# Patient Record
Sex: FEMALE | Race: BLACK OR AFRICAN AMERICAN | NOT HISPANIC OR LATINO | Employment: UNEMPLOYED | URBAN - METROPOLITAN AREA
[De-identification: names, ages, dates, MRNs, and addresses within clinical notes are randomized per-mention and may not be internally consistent; named-entity substitution may affect disease eponyms.]

---

## 2018-02-19 DIAGNOSIS — T84.093A OTHER MECHANICAL COMPLICATION OF INTERNAL LEFT KNEE PROSTHESIS, INITIAL ENCOUNTER: Primary | ICD-10-CM

## 2018-02-22 ENCOUNTER — CLINICAL SUPPORT (OUTPATIENT)
Dept: REHABILITATION | Facility: HOSPITAL | Age: 53
End: 2018-02-22
Attending: ORTHOPAEDIC SURGERY
Payer: COMMERCIAL

## 2018-02-22 DIAGNOSIS — M25.562 ACUTE PAIN OF LEFT KNEE: ICD-10-CM

## 2018-02-22 DIAGNOSIS — R26.2 DIFFICULTY WALKING: ICD-10-CM

## 2018-02-22 PROCEDURE — 97161 PT EVAL LOW COMPLEX 20 MIN: CPT

## 2018-02-22 PROCEDURE — 97140 MANUAL THERAPY 1/> REGIONS: CPT

## 2018-02-22 NOTE — PROGRESS NOTES
[facility]   PATIENT EVALUATION    Date: 02/22/2018  Start Time: 1400  Stop Time: 1500  Visit #:    Patient Name: Alcira Selby  Clinic Number: [id number]  Age: 52 y.o.  Gender: female    Diagnosis:   Encounter Diagnoses   Name Primary?    Acute pain of left knee     Difficulty walking        Referring Physician: Stacey Friedman MD  Treatment Orders: PT Eval and Treat      History     No past medical history on file.    No current outpatient prescriptions on file.     No current facility-administered medications for this visit.        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 Original surgery was January 2017. Pt underwent 6 months of therapy but continued to have pain. Denies any numbness or tingling at this time. Pt states she is unable to lift the extremity independently.    Date of Surgery: 2/15/18  Precautions: None    Mechanism of Injury: Post -op    Pain Location: knee   Pain Description: Aching and Tight  Current Pain: 10/10  Least Pain: 9/10  Worst Pain: 10/10  Aggravating Factors: Bending, walking, squatting  Relieving Factors: R.I.C.E    Diagnostic Tests: NA  Prior Therapy: PT in hospital    Occupation: OR tech  Job Status: Not working  Job Duties: Prolonged standing    Sports/Recreational Activities: Community ambulator  Extremity Dominance: Right    Prior Level of Function: Independent  Functional Deficits Leading to Referral/Nature of Injury: Pain with standing ADL, walking, squatting, kneeling, climbing  Patient Therapy Goals: Improve gait and decrease pain with daily activities  Cultural/Environmental/Spiritual Barriers to Treatment or Learning: None      Objective     Observation: Pt enters Mod I using a rolling walker  Posture: Bent knee pattern  Gait: Antalgic with limited weight bearing on the LLE, shortened step length and  stance time    Dermatomes: Intact  DTRs: NT    Palpation: (+) Tenderness  (+) Medial/lateral joint line LLE  (+) Quadriceps LLE    Range of Motion:     Right Knee: 0-125    Left Knee: -5 - 90    Patella Mobility:   Right Knee: WNL    Left Knee: NT      Flexibility:   Hamstring: Restricted Mod  Hip Flexor: Restricted Mod  Quad: NT  GSS: Restricted Mod  ITB: NT    Strength:   Right Hip  Flexion: 4  Extension: 4  Abduction: 4    Left Hip  Flexion: 3-  Extension: 3-  Abduction: 3-    Right Knee  Flexion: 4  Extension: 4    Left Knee  Flexion: 2+  Extension: 2+    Special Tests: NT secondary to dx  Eli's  Chiquita's  Anterior Drawer  Posterior Drawer  Valgus Laxity  Varus Laxity      Treatment:   - Manual edema brushing x 10 min  - HEP edu: Quad sets, Heel slides, SLR, Supine hip abd, Ext stretch GSS    Functional Limitations Reports - G Codes  Category: Mobility  Tool: FOTO  Score: 10            Assessment     This is a 52 y.o. 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 and demonstrates limitations as described in the problem list. Primary impairments include strength, ROM, edema, gait, balance, and pain which limits functional mobility.  Pt demonstrates good rehab potential. Pt will benefit from physcial therapy services in order to maximize pain free and/or functional use of left LE. The following goals were discussed with the patient and patient is in agreement with them as to be addressed in the treatment plan. Pt was given a HEP consisting. Pt verbally understood the instructions as they were given and demonstrated proper form and technique during therapy. Pt was advised to perform these exercises free of pain, and to stop performing them if pain occurs.     Medical necessity is demonstrated by the following problem list:   - Pain limits function of effected part for all activities  - Unable to participate in daily activities    - Requires skilled supervision to complete and progress HEP  - Fall risk - impaired balance   - Continued inability to participate in vocational pursuits    Short Term Goals (4 Weeks):  - Pt will increase ROM to 0-115 degrees PROM  - Pt will increase strength to perform a SLR without extension lag  - Decrease Pain to 0-6/10 ambulating 150 feet with a straight cane  - Decrease edema by 2CM  - Pt independent with HEP with progressions.     Long Term Goals (8 Weeks):  - Pt will increase ROM to 0-125 degrees AROM  - Pt will increase strength to L knee extnsion to > 4/5  - Decrease Pain to 0-3/10 with community ambulation  - Pt to return to work simulated activity  - Pt to improve FOTO score to >50      Plan     Pt will be treated by physical therapy 2-3 times a week for 10 weeks for manual therapy, therapeutic exercise, home exercise program, patient education, and modalities PRN to achieve established goals. Alcira may at times be seen by a PTA as part of the Rehab Team.  Plan to begin TI in order to manage edema then decrease frequency    José Miguel Laughlin, PT, DPT, OCS  02/22/2018    I CERTIFY THE NEED FOR THESE SERVICES FURNISHED UNDER THIS PLAN OF TREATMENT AND WHILE UNDER MY CAR  Physician's comments: _____________________________________________________________________________________________________________________    Physician's Name: ___________________________________

## 2018-02-23 ENCOUNTER — CLINICAL SUPPORT (OUTPATIENT)
Dept: REHABILITATION | Facility: HOSPITAL | Age: 53
End: 2018-02-23
Attending: ORTHOPAEDIC SURGERY
Payer: COMMERCIAL

## 2018-02-23 DIAGNOSIS — M25.562 ACUTE PAIN OF LEFT KNEE: Primary | ICD-10-CM

## 2018-02-23 PROCEDURE — 97140 MANUAL THERAPY 1/> REGIONS: CPT

## 2018-02-23 PROCEDURE — 97110 THERAPEUTIC EXERCISES: CPT

## 2018-02-23 NOTE — PROGRESS NOTES
"[facility] PHYSICAL THERAPY       Date: 02/22/2018  Start Time: 1330  Stop Time: 1430  Visit #: 2    Patient Name: Alcira Selby  Clinic Number: [id number]  Age: 52 y.o.  Gender: female    Diagnosis:   Encounter Diagnoses   Name Primary?    Acute pain of left knee     Difficulty walking        Referring Physician: Stacey Friedman MD  Treatment Orders: PT Eval and Treat      History     No past medical history on file.    No current outpatient prescriptions on file.     No current facility-administered medications for this visit.        Review of patient's allergies indicates:  Allergies not on file      Subjective     Pt stated pain level in L knee in 9/10. Minimal compliance with HEP.       Objective      Pt enters Mod I using a rolling walker, antalgic gait pattern with  Bent knee pattern, swelling and edema   PROM L knee flexion 100'   Treatment:   -QS 2x10/3"  -HS with strap 2x10  - Manual edema brushing x 25 min  -Stationary bike 10' 1/2 and full revolution Seat level 7  -CP x 10' L knee     Functional Limitations Reports - G Codes  Category: Mobility  Tool: FOTO  Score: 10            Assessment    pt tolerating tx well with decreased pain and increased knee flexion tolerating full revolution in reverse. VC/TC for quad engagement and instructed in compliance with HEP and RICE. Continue to progress as tolerated.   This is a 52 y.o. 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 and demonstrates limitations as described in the problem list. Primary impairments include strength, ROM, edema, gait, balance, and pain which limits functional mobility.  Pt demonstrates good rehab potential. Pt will benefit from physcial therapy services in order to maximize pain free and/or functional use of left LE. The following goals were discussed with the patient and patient is in agreement with them as to be addressed " in the treatment plan. Pt was given a HEP consisting. Pt verbally understood the instructions as they were given and demonstrated proper form and technique during therapy. Pt was advised to perform these exercises free of pain, and to stop performing them if pain occurs.     Medical necessity is demonstrated by the following problem list:   - Pain limits function of effected part for all activities  - Unable to participate in daily activities   - Requires skilled supervision to complete and progress HEP  - Fall risk - impaired balance   - Continued inability to participate in vocational pursuits    Short Term Goals (4 Weeks):  - Pt will increase ROM to 0-115 degrees PROM  - Pt will increase strength to perform a SLR without extension lag  - Decrease Pain to 0-6/10 ambulating 150 feet with a straight cane  - Decrease edema by 2CM  - Pt independent with HEP with progressions.     Long Term Goals (8 Weeks):  - Pt will increase ROM to 0-125 degrees AROM  - Pt will increase strength to L knee extnsion to > 4/5  - Decrease Pain to 0-3/10 with community ambulation  - Pt to return to work simulated activity  - Pt to improve FOTO score to >50      Plan     Pt will be treated by physical therapy 2-3 times a week for 10 weeks for manual therapy, therapeutic exercise, home exercise program, patient education, and modalities PRN to achieve established goals. Alcira may at times be seen by a PTA as part of the Rehab Team.  Plan to begin TI in order to manage edema then decrease frequency    Nelson Wall PTA, STS

## 2018-02-26 ENCOUNTER — CLINICAL SUPPORT (OUTPATIENT)
Dept: REHABILITATION | Facility: HOSPITAL | Age: 53
End: 2018-02-26
Attending: ORTHOPAEDIC SURGERY
Payer: COMMERCIAL

## 2018-02-26 DIAGNOSIS — M25.562 ACUTE PAIN OF LEFT KNEE: Primary | ICD-10-CM

## 2018-02-26 PROCEDURE — 97140 MANUAL THERAPY 1/> REGIONS: CPT

## 2018-02-26 PROCEDURE — 97110 THERAPEUTIC EXERCISES: CPT

## 2018-02-26 NOTE — PROGRESS NOTES
"[facility]       Date: 02/22/2018  Start Time: 1100  Stop Time: 1200  Visit #: 3    Patient Name: Alcira Selby  Clinic Number: [id number]  Age: 52 y.o.  Gender: female    Diagnosis:   Encounter Diagnoses   Name Primary?    Acute pain of left knee     Difficulty walking        Referring Physician: Stacey Friedman MD  Treatment Orders: PT Eval and Treat      History     No past medical history on file.    No current outpatient prescriptions on file.     No current facility-administered medications for this visit.        Review of patient's allergies indicates:  Allergies not on file      Subjective     Pt stating continued severe pain in L knee when arrived for tx. Pain scale 9/10. Minimal compliance with HEP but stated "alot of walking over the weekend".        Objective      Pt enters Mod I using a rolling walker, antalgic gait pattern with knee flexed, ambulating on toes. Decreased swelling and edema   PROM L knee flexion 100'   Treatment:   -HS with strap 2x10  -QS 2x10/3"  -Prone knee hangs 1#/5'  -Manual therapy x 10' patella MOBS/PROM/STM roller L HS/calf  x 25'  -Stationary bike 10' full revolution Seat level 7 for increased ROM   -CP x 10' L knee np   Pt educated on Compliance with TKE exercise along with Knee flexion HEP.      Assessment    Pt tolerating tx well. Pt subjective pain level not consistent with activity level without any sign of pain or facial expressions except with PROM and prone hangs.  Pt very slow progressing through therapeutic exercise.  VC/TC for quad engagement and instructed in compliance with HEP and RICE. Continue to progress as tolerated.   This is a 52 y.o. 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 and demonstrates limitations as described in the problem list. Primary impairments include strength, ROM, edema, gait, balance, and pain which limits " functional mobility.  Pt demonstrates good rehab potential. Pt will benefit from physcial therapy services in order to maximize pain free and/or functional use of left LE. The following goals were discussed with the patient and patient is in agreement with them as to be addressed in the treatment plan. Pt was given a HEP consisting. Pt verbally understood the instructions as they were given and demonstrated proper form and technique during therapy. Pt was advised to perform these exercises free of pain, and to stop performing them if pain occurs.     Medical necessity is demonstrated by the following problem list:   - Pain limits function of effected part for all activities  - Unable to participate in daily activities   - Requires skilled supervision to complete and progress HEP  - Fall risk - impaired balance   - Continued inability to participate in vocational pursuits    Short Term Goals (4 Weeks):  - Pt will increase ROM to 0-115 degrees PROM  - Pt will increase strength to perform a SLR without extension lag  - Decrease Pain to 0-6/10 ambulating 150 feet with a straight cane  - Decrease edema by 2CM  - Pt independent with HEP with progressions.     Long Term Goals (8 Weeks):  - Pt will increase ROM to 0-125 degrees AROM  - Pt will increase strength to L knee extnsion to > 4/5  - Decrease Pain to 0-3/10 with community ambulation  - Pt to return to work simulated activity  - Pt to improve FOTO score to >50      Plan     Pt will be treated by physical therapy 2-3 times a week for 10 weeks for manual therapy, therapeutic exercise, home exercise program, patient education, and modalities PRN to achieve established goals. Alcira may at times be seen by a PTA as part of the Rehab Team.  Plan to begin TI in order to manage edema then decrease frequency    Nelson Wall PTA, STS

## 2018-03-01 ENCOUNTER — CLINICAL SUPPORT (OUTPATIENT)
Dept: REHABILITATION | Facility: HOSPITAL | Age: 53
End: 2018-03-01
Attending: ORTHOPAEDIC SURGERY
Payer: COMMERCIAL

## 2018-03-01 DIAGNOSIS — M25.562 ACUTE PAIN OF LEFT KNEE: Primary | ICD-10-CM

## 2018-03-01 PROCEDURE — 97110 THERAPEUTIC EXERCISES: CPT

## 2018-03-01 PROCEDURE — 97140 MANUAL THERAPY 1/> REGIONS: CPT

## 2018-03-01 NOTE — PROGRESS NOTES
"[facility]       Date: 02/22/2018  Start Time: 1230  Stop Time:  1330  Visit #: 4    Patient Name: Alcira Selby  Clinic Number: [id number]  Age: 52 y.o.  Gender: female    Diagnosis:   Encounter Diagnoses   Name Primary?    Acute pain of left knee     Difficulty walking        Referring Physician: Stacey Friedman MD  Treatment Orders: PT Eval and Treat      History     No past medical history on file.    No current outpatient prescriptions on file.     No current facility-administered medications for this visit.        Review of patient's allergies indicates:  Allergies not on file      Subjective     Pt reporting staples removed yesterday. " Its feeling better" Stated pain level in 8/10 when arrived for tx. Minimal compliance with HEP and RICE.       Objective      Pt enters Mod I using a rolling walker, antalgic gait pattern with knee flexed, ambulating on toes. Decreased swelling and edema   PROM L knee flexion 100'       Treatment:   -Stationary bike 10' half/full revolution Seat level 6 for increased ROM - lower seat   -Standing GTB TKE 25x  -Prone knee hangs 1#/5' with HS roller  --Manual therapy  patella MOBS/PROM/retro edema massage/;overpressure ext x 25'  -QS 2x10/3"  -CP x 10' L knee np      -HS with strap 2x10 np     Pt educated on Compliance with TKE exercise along with Knee flexion HEP.      Assessment    Pt tolerating tx well. Subjective pain level still not consistent with functional level. No sign of pain or facial expressions except with manual therapy.  Pt very slow progressing through therapeutic exercise.  VC/TC for quad engagement and instructed in compliance with HEP and RICE. Continue to progress as tolerated.   This is a 52 y.o. 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 and demonstrates limitations as described in the problem list. Primary impairments include " strength, ROM, edema, gait, balance, and pain which limits functional mobility.  Pt demonstrates good rehab potential. Pt will benefit from physcial therapy services in order to maximize pain free and/or functional use of left LE. The following goals were discussed with the patient and patient is in agreement with them as to be addressed in the treatment plan. Pt was given a HEP consisting. Pt verbally understood the instructions as they were given and demonstrated proper form and technique during therapy. Pt was advised to perform these exercises free of pain, and to stop performing them if pain occurs.     Medical necessity is demonstrated by the following problem list:   - Pain limits function of effected part for all activities  - Unable to participate in daily activities   - Requires skilled supervision to complete and progress HEP  - Fall risk - impaired balance   - Continued inability to participate in vocational pursuits    Short Term Goals (4 Weeks):  - Pt will increase ROM to 0-115 degrees PROM  - Pt will increase strength to perform a SLR without extension lag  - Decrease Pain to 0-6/10 ambulating 150 feet with a straight cane  - Decrease edema by 2CM  - Pt independent with HEP with progressions.     Long Term Goals (8 Weeks):  - Pt will increase ROM to 0-125 degrees AROM  - Pt will increase strength to L knee extnsion to > 4/5  - Decrease Pain to 0-3/10 with community ambulation  - Pt to return to work simulated activity  - Pt to improve FOTO score to >50      Plan     Pt will be treated by physical therapy 2-3 times a week for 10 weeks for manual therapy, therapeutic exercise, home exercise program, patient education, and modalities PRN to achieve established goals. Blayneurielsysaeid may at times be seen by a PTA as part of the Rehab Team.  Plan to begin TI in order to manage edema then decrease frequency    Nelson Wall PTA, STS

## 2018-03-02 ENCOUNTER — CLINICAL SUPPORT (OUTPATIENT)
Dept: REHABILITATION | Facility: HOSPITAL | Age: 53
End: 2018-03-02
Attending: ORTHOPAEDIC SURGERY
Payer: COMMERCIAL

## 2018-03-02 DIAGNOSIS — M25.562 ACUTE PAIN OF LEFT KNEE: Primary | ICD-10-CM

## 2018-03-02 PROCEDURE — 97110 THERAPEUTIC EXERCISES: CPT

## 2018-03-02 PROCEDURE — 97014 ELECTRIC STIMULATION THERAPY: CPT

## 2018-03-02 PROCEDURE — 97140 MANUAL THERAPY 1/> REGIONS: CPT

## 2018-03-02 NOTE — PROGRESS NOTES
"[facility]       Date: 02/22/2018  Start Time: 1225  Stop Time:  1325  Visit #: 5    Patient Name: Alcira Selby  Clinic Number: [id number]  Age: 52 y.o.  Gender: female    Diagnosis:   Encounter Diagnoses   Name Primary?    Acute pain of left knee     Difficulty walking        Referring Physician: Stacey Friedman MD  Treatment Orders: PT Eval and Treat      History     No past medical history on file.    No current outpatient prescriptions on file.     No current facility-administered medications for this visit.        Review of patient's allergies indicates:  Allergies not on file      Subjective     Pt reporting "sore after tx yesterday". Stated pain level in 6/10 when arrived for tx. Minimal compliance with HEP and RICE.       Objective      Pt enters Mod I using a rolling walker, antalgic gait pattern with knee flexed, ambulating on toes. Decreased swelling and edema, L knee flexion contracture   PROM L knee flexion 100'       Treatment:   -Stationary bike 10' full revolution Seat level 6 for increased ROM - lower seat   --Manual therapy  patella MOBS/PROM/and over pressure ext 15'  -Prone knee hangs 1#/5' with HS roller  -prone TKE on bolster 30x/3"  -L SLR with strap 3x10/3"  -Standing GTB TKE 25x np  -QS 2x10/3"    -NMES and CP x 10' L knee            Pt educated on Compliance with TKE exercise along with Knee flexion HEP.      Assessment    Pt tolerating tx well. Slow progress through therapeutic exercise. Pt with overall decrease in pain and edema but still with noted knee flexion contracture. Continue with progressing to TKE.  VC/TC for quad engagement and instructed in compliance with HEP and RICE. Continue to progress as tolerated.   This is a 52 y.o. 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 and demonstrates limitations as described in the problem list. Primary " impairments include strength, ROM, edema, gait, balance, and pain which limits functional mobility.  Pt demonstrates good rehab potential. Pt will benefit from physcial therapy services in order to maximize pain free and/or functional use of left LE. The following goals were discussed with the patient and patient is in agreement with them as to be addressed in the treatment plan. Pt was given a HEP consisting. Pt verbally understood the instructions as they were given and demonstrated proper form and technique during therapy. Pt was advised to perform these exercises free of pain, and to stop performing them if pain occurs.     Medical necessity is demonstrated by the following problem list:   - Pain limits function of effected part for all activities  - Unable to participate in daily activities   - Requires skilled supervision to complete and progress HEP  - Fall risk - impaired balance   - Continued inability to participate in vocational pursuits    Short Term Goals (4 Weeks):  - Pt will increase ROM to 0-115 degrees PROM  - Pt will increase strength to perform a SLR without extension lag  - Decrease Pain to 0-6/10 ambulating 150 feet with a straight cane  - Decrease edema by 2CM  - Pt independent with HEP with progressions.     Long Term Goals (8 Weeks):  - Pt will increase ROM to 0-125 degrees AROM  - Pt will increase strength to L knee extnsion to > 4/5  - Decrease Pain to 0-3/10 with community ambulation  - Pt to return to work simulated activity  - Pt to improve FOTO score to >50      Plan     Pt will be treated by physical therapy 2-3 times a week for 10 weeks for manual therapy, therapeutic exercise, home exercise program, patient education, and modalities PRN to achieve established goals. Essencesysaeid may at times be seen by a PTA as part of the Rehab Team.  Plan to begin TI in order to manage edema then decrease frequency    Nelson Wall PTA, STS

## 2018-03-06 ENCOUNTER — CLINICAL SUPPORT (OUTPATIENT)
Dept: REHABILITATION | Facility: HOSPITAL | Age: 53
End: 2018-03-06
Attending: ORTHOPAEDIC SURGERY
Payer: COMMERCIAL

## 2018-03-06 PROCEDURE — 97110 THERAPEUTIC EXERCISES: CPT

## 2018-03-06 PROCEDURE — 97140 MANUAL THERAPY 1/> REGIONS: CPT

## 2018-03-06 NOTE — PROGRESS NOTES
"[facility] PHYSICAL THERAPY       Date: 02/22/2018  Start Time: 1250  Stop Time:  1350  Visit #: 6    Patient Name: Alcira Selby  Clinic Number: [id number]  Age: 52 y.o.  Gender: female    Diagnosis:   Encounter Diagnoses   Name Primary?    Acute pain of left knee     Difficulty walking        Referring Physician: Stacey Friedman MD  Treatment Orders: PT Eval and Treat      History     No past medical history on file.    No current outpatient prescriptions on file.     No current facility-administered medications for this visit.        Review of patient's allergies indicates:  Allergies not on file      Subjective     Pt reporting mod soreness today when arrived for tx. Stated pain level in 7/10 when arrived for tx. Minimal compliance with HEP and RICE.       Objective      Pt enters Mod I using a rolling walker, antalgic gait pattern with knee flexed, ambulating on toes. Decreased swelling and edema, L knee flexion contracture   PROM L knee flexion 100'       Treatment:   -Stationary bike 10' full revolution Seat level 6 for increased ROM - lower seat   --Manual therapy  patella MOBS/PROM/and over pressure ext 10'  -Prone knee hangs 5#/5' with HS roller  -prone TKE on bolster 30x/3"  -prone knee flexion w/strap 5'  -L SLR with strap 3x10/3"  -Standing GTB TKE 25x np  -QS 2x10/3"    -NMES and CP x 10' L knee            Pt educated on Compliance with TKE exercise along with Knee flexion HEP.      Assessment    Pt tolerating tx well. Still very slow progressing  through therapeutic exercise requiring VC/TC for encouragement and for increased pace.  Poor gait pattern with knee flexion contracture but slow progress to TKE. VC/TC for quad engagement and instructed in compliance with HEP and RICE. Continue to progress as tolerated.   This is a 52 y.o. 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 and " demonstrates limitations as described in the problem list. Primary impairments include strength, ROM, edema, gait, balance, and pain which limits functional mobility.  Pt demonstrates good rehab potential. Pt will benefit from physcial therapy services in order to maximize pain free and/or functional use of left LE. The following goals were discussed with the patient and patient is in agreement with them as to be addressed in the treatment plan. Pt was given a HEP consisting. Pt verbally understood the instructions as they were given and demonstrated proper form and technique during therapy. Pt was advised to perform these exercises free of pain, and to stop performing them if pain occurs.     Medical necessity is demonstrated by the following problem list:   - Pain limits function of effected part for all activities  - Unable to participate in daily activities   - Requires skilled supervision to complete and progress HEP  - Fall risk - impaired balance   - Continued inability to participate in vocational pursuits    Short Term Goals (4 Weeks):  - Pt will increase ROM to 0-115 degrees PROM  - Pt will increase strength to perform a SLR without extension lag  - Decrease Pain to 0-6/10 ambulating 150 feet with a straight cane  - Decrease edema by 2CM  - Pt independent with HEP with progressions.     Long Term Goals (8 Weeks):  - Pt will increase ROM to 0-125 degrees AROM  - Pt will increase strength to L knee extnsion to > 4/5  - Decrease Pain to 0-3/10 with community ambulation  - Pt to return to work simulated activity  - Pt to improve FOTO score to >50      Plan     Pt will be treated by physical therapy 2-3 times a week for 10 weeks for manual therapy, therapeutic exercise, home exercise program, patient education, and modalities PRN to achieve established goals. Alcira may at times be seen by a PTA as part of the Rehab Team.  Plan to begin TI in order to manage edema then decrease frequency    Nelson Wall  PTA, STS

## 2018-03-08 ENCOUNTER — CLINICAL SUPPORT (OUTPATIENT)
Dept: REHABILITATION | Facility: HOSPITAL | Age: 53
End: 2018-03-08
Attending: ORTHOPAEDIC SURGERY
Payer: COMMERCIAL

## 2018-03-08 DIAGNOSIS — M25.562 ACUTE PAIN OF LEFT KNEE: ICD-10-CM

## 2018-03-08 DIAGNOSIS — R26.2 DIFFICULTY WALKING: ICD-10-CM

## 2018-03-08 PROCEDURE — 97110 THERAPEUTIC EXERCISES: CPT

## 2018-03-08 PROCEDURE — 97140 MANUAL THERAPY 1/> REGIONS: CPT

## 2018-03-08 NOTE — PROGRESS NOTES
[facility]       Date: 02/22/2018  Start Time: 1225  Stop Time:  1325  Visit #: 7    Patient Name: Alcira Selby  Clinic Number: [id number]  Age: 52 y.o.  Gender: female    Diagnosis:   Encounter Diagnoses   Name Primary?    Acute pain of left knee     Difficulty walking        Referring Physician: Stacey Friedman MD  Treatment Orders: PT Eval and Treat      History     No past medical history on file.    No current outpatient prescriptions on file.     No current facility-administered medications for this visit.        Review of patient's allergies indicates:  Allergies not on file      Subjective     Pt states taht she is getting a little better each day. Continues to have difficulty lifting her leg on her own but states she has been compliant with her HEP.      Objective      Pt enters Mod I using a rolling walker, antalgic gait pattern with knee flexed, ambulating on toes. Decreased swelling and edema, L knee flexion contracture   PROM L knee flexion 105'       Treatment:   -Stationary bike 10' full revolution Seat level 6 for increased ROM - lower seat   --Manual therapy  patella MOBS/PROM/and over pressure ext 15'  Slantboard x 3 min  - Hamstring stretch at stair x 2 min  - Star taps x 20 bilateral  - Stair lunge on 2nd step x 20  - Heel toe walking in ll bars x 5 min  - Fwd/retro walking in llbars  - Seated knee flexion/extension with manual resistance  Prone knee hang 5 min 3#               Pt educated on Compliance with TKE exercise along with Knee flexion HEP.      Assessment    Pt demonstrated a good progression ambulating with the straight cane. She has been advised to use her cane while in the home and continue to use her walker while in the community.   This is a 52 y.o. 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 and demonstrates limitations as described in the problem  list. Primary impairments include strength, ROM, edema, gait, balance, and pain which limits functional mobility.  Pt demonstrates good rehab potential. Pt will benefit from physcial therapy services in order to maximize pain free and/or functional use of left LE. The following goals were discussed with the patient and patient is in agreement with them as to be addressed in the treatment plan. Pt was given a HEP consisting. Pt verbally understood the instructions as they were given and demonstrated proper form and technique during therapy. Pt was advised to perform these exercises free of pain, and to stop performing them if pain occurs.     Medical necessity is demonstrated by the following problem list:   - Pain limits function of effected part for all activities  - Unable to participate in daily activities   - Requires skilled supervision to complete and progress HEP  - Fall risk - impaired balance   - Continued inability to participate in vocational pursuits    Short Term Goals (4 Weeks):  - Pt will increase ROM to 0-115 degrees PROM  - Pt will increase strength to perform a SLR without extension lag  - Decrease Pain to 0-6/10 ambulating 150 feet with a straight cane  - Decrease edema by 2CM  - Pt independent with HEP with progressions.     Long Term Goals (8 Weeks):  - Pt will increase ROM to 0-125 degrees AROM  - Pt will increase strength to L knee extnsion to > 4/5  - Decrease Pain to 0-3/10 with community ambulation  - Pt to return to work simulated activity  - Pt to improve FOTO score to >50      Plan     Pt will be treated by physical therapy 2-3 times a week for 10 weeks for manual therapy, therapeutic exercise, home exercise program, patient education, and modalities PRN to achieve established goals. Alcira may at times be seen by a PTA as part of the Rehab Team.  Plan to begin TI in order to manage edema then decrease frequency    José Miguel Laughlin, PT , DPT, OCS

## 2018-03-09 ENCOUNTER — CLINICAL SUPPORT (OUTPATIENT)
Dept: REHABILITATION | Facility: HOSPITAL | Age: 53
End: 2018-03-09
Attending: ORTHOPAEDIC SURGERY
Payer: COMMERCIAL

## 2018-03-09 DIAGNOSIS — M25.562 ACUTE PAIN OF LEFT KNEE: Primary | ICD-10-CM

## 2018-03-09 PROCEDURE — 97110 THERAPEUTIC EXERCISES: CPT

## 2018-03-09 PROCEDURE — 97140 MANUAL THERAPY 1/> REGIONS: CPT

## 2018-03-16 ENCOUNTER — CLINICAL SUPPORT (OUTPATIENT)
Dept: REHABILITATION | Facility: HOSPITAL | Age: 53
End: 2018-03-16
Attending: ORTHOPAEDIC SURGERY
Payer: COMMERCIAL

## 2018-03-16 PROCEDURE — 97140 MANUAL THERAPY 1/> REGIONS: CPT

## 2018-03-16 PROCEDURE — 97110 THERAPEUTIC EXERCISES: CPT

## 2018-03-19 ENCOUNTER — CLINICAL SUPPORT (OUTPATIENT)
Dept: REHABILITATION | Facility: HOSPITAL | Age: 53
End: 2018-03-19
Attending: ORTHOPAEDIC SURGERY
Payer: COMMERCIAL

## 2018-03-19 DIAGNOSIS — R26.2 DIFFICULTY WALKING: ICD-10-CM

## 2018-03-19 DIAGNOSIS — M25.562 ACUTE PAIN OF LEFT KNEE: ICD-10-CM

## 2018-03-19 PROCEDURE — 97110 THERAPEUTIC EXERCISES: CPT

## 2018-03-19 PROCEDURE — 97140 MANUAL THERAPY 1/> REGIONS: CPT

## 2018-03-19 NOTE — PROGRESS NOTES
"[facility]       Date: 03/19/2018  Start Time: 1500  Stop Time:  1600  Visit #: 8    Patient Name: Alcira Selby  Clinic Number: [id number]  Age: 52 y.o.  Gender: female    Diagnosis:   Encounter Diagnoses   Name Primary?    Acute pain of left knee     Difficulty walking        Referring Physician: Stacey Friedman MD  Treatment Orders: PT Eval and Treat      History     No past medical history on file.    No current outpatient prescriptions on file.     No current facility-administered medications for this visit.        Review of patient's allergies indicates:  Allergies not on file      Subjective     Pt reports that her knee is a little sore today. 7/10 pain today.      Objective      Pt enters Mod I using SC, antalgic gait pattern with knee flexed, ambulating on toes. Decreased swelling and edema, L knee flexion contracture   PROM L knee flexion 110'       Treatment:    -HS 30x    --Manual therapy - patella MOBS/PROM/and over pressure ext and HS roller 10  - Nu Step L3 x 10 min for ROM   - Psoas March with manual resistance x 15  - SAQ x 20  - DKTC x 20  - Standing Hip flexion x 20  - Heel toe walking x 5 min  - Fwd ambulation with arm swing cues x 5 min  -  Not today  Prone bolster TKE 30x/3"  -Standing TKE GTB 2x30  -QS 3x10  -GSS w/strap 1'  -HSS w/strap 1'  -B LAQ 20x  -CP x 10'      Not today  -Slantboard x 1 min  - Hamstring stretch at stair x 1min  -Step ups 6" 3x10  - Star taps x 20 bilateral  - Stair lunge on 2nd step x 20s  - Seated knee flexion/extension with manual resistance                 Pt educated on Compliance with TKE exercise along with Knee flexion HEP.      Assessment    Pt tolerated tx well demonstrated a significant imprvement in her gait pattern with tactile cues. Plan to continue to progess as tolerated.    This is a 52 y.o. female referred to outpatient physical therapy and presents with a medical diagnosis of s/p left total kne revision on " 2/15/2018. and demonstrates limitations as described in the problem list. Primary impairments include strength, ROM, edema, gait, balance, and pain which limits functional mobility.  Pt demonstrates good rehab potential. Pt will benefit from physcial therapy services in order to maximize pain free and/or functional use of left LE. The following goals were discussed with the patient and patient is in agreement with them as to be addressed in the treatment plan. Pt was given a HEP consisting. Pt verbally understood the instructions as they were given and demonstrated proper form and technique during therapy. Pt was advised to perform these exercises free of pain, and to stop performing them if pain occurs.     Medical necessity is demonstrated by the following problem list:   - Pain limits function of effected part for all activities  - Unable to participate in daily activities   - Requires skilled supervision to complete and progress HEP  - Fall risk - impaired balance   - Continued inability to participate in vocational pursuits    Short Term Goals (4 Weeks):  - Pt will increase ROM to 0-115 degrees PROM  - Pt will increase strength to perform a SLR without extension lag  - Decrease Pain to 0-6/10 ambulating 150 feet with a straight cane  - Decrease edema by 2CM  - Pt independent with HEP with progressions.     Long Term Goals (8 Weeks):  - Pt will increase ROM to 0-125 degrees AROM  - Pt will increase strength to L knee extnsion to > 4/5  - Decrease Pain to 0-3/10 with community ambulation  - Pt to return to work simulated activity  - Pt to improve FOTO score to >50      Plan     Pt will be treated by physical therapy 2-3 times a week for 10 weeks for manual therapy, therapeutic exercise, home exercise program, patient education, and modalities PRN to achieve established goals. Alcira may at times be seen by a PTA as part of the Rehab Team.  Plan to begin TI in order to manage edema then decrease  frequency    José Miguel Laughlin, PT , DPT, OCS

## 2018-03-22 ENCOUNTER — CLINICAL SUPPORT (OUTPATIENT)
Dept: REHABILITATION | Facility: HOSPITAL | Age: 53
End: 2018-03-22
Attending: ORTHOPAEDIC SURGERY
Payer: COMMERCIAL

## 2018-03-22 DIAGNOSIS — M25.562 ACUTE PAIN OF LEFT KNEE: Primary | ICD-10-CM

## 2018-03-22 PROCEDURE — 97110 THERAPEUTIC EXERCISES: CPT

## 2018-03-22 NOTE — PROGRESS NOTES
"[facility] PHYS[facility] THERAP[facility]       Date: 03/19/2018  Start Time: 1330  Stop Time:  1430  Visit #: 10    Patient Name: Alcira Selby  Clinic Number: [id number]  Age: 52 y.o.  Gender: female    Diagnosis:   Encounter Diagnoses   Name Primary?    Acute pain of left knee     Difficulty walking        Referring Physician: Stacey Friedman MD  Treatment Orders: PT Eval and Treat      History     No past medical history on file.    No current outpatient prescriptions on file.     No current facility-administered medications for this visit.        Review of patient's allergies indicates:  Allergies not on file      Subjective     Pt reports that her knee is a little sore today. 7/10 pain today.      Objective      Pt enters Mod I using SC, antalgic gait pattern with knee flexed, ambulating on toes. Decreased swelling and edema, L knee flexion contracture   PROM L knee flexion 110'       Treatment:  - Nu Step L3 x 10 min for ROM   -Gait trg - on TM 10' working on improved heel strike/toe off and equal step length   -Marching 30x for increased hip and knee flexion   -Standing GTB TKE L LE 3x10  -Seated LAQ 3x10  -hip abd side step L/R 1 lap  -step up 6" L lateral 30x   -GSS w/strap 3x/30"  -HSS w/strap 3x/30"    -CP x 10'      Not today  -Slantboard x 1 min  - Hamstring stretch at stair x 1min  -Step ups 6" 3x10  - Star taps x 20 bilateral  - Stair lunge on 2nd step x 20s  - Seated knee flexion/extension with manual resistance  Psoas March with manual resistance x 15  - SAQ x 20  - DKTC x 20  - Standing Hip flexion x 20  - Heel toe walking x 5 min  - Fwd ambulation with arm swing cues x 5 min                   Pt educated on Compliance with TKE exercise along with Knee flexion HEP.      Assessment    Pt tolerated tx well. Continued improvement in gait patten with VC/TC.  Plan to continue to progess as tolerated.    This is a 52 y.o. 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 and demonstrates limitations as described in the problem list. Primary impairments include strength, ROM, edema, gait, balance, and pain which limits functional mobility.  Pt demonstrates good rehab potential. Pt will benefit from physcial therapy services in order to maximize pain free and/or functional use of left LE. The following goals were discussed with the patient and patient is in agreement with them as to be addressed in the treatment plan. Pt was given a HEP consisting. Pt verbally understood the instructions as they were given and demonstrated proper form and technique during therapy. Pt was advised to perform these exercises free of pain, and to stop performing them if pain occurs.     Medical necessity is demonstrated by the following problem list:   - Pain limits function of effected part for all activities  - Unable to participate in daily activities   - Requires skilled supervision to complete and progress HEP  - Fall risk - impaired balance   - Continued inability to participate in vocational pursuits    Short Term Goals (4 Weeks):  - Pt will increase ROM to 0-115 degrees PROM  - Pt will increase strength to perform a SLR without extension lag  - Decrease Pain to 0-6/10 ambulating 150 feet with a straight cane  - Decrease edema by 2CM  - Pt independent with HEP with progressions.     Long Term Goals (8 Weeks):  - Pt will increase ROM to 0-125 degrees AROM  - Pt will increase strength to L knee extnsion to > 4/5  - Decrease Pain to 0-3/10 with community ambulation  - Pt to return to work simulated activity  - Pt to improve FOTO score to >50      Plan     Pt will be treated by physical therapy 2-3 times a week for 10 weeks for manual therapy, therapeutic exercise, home exercise program, patient education, and modalities PRN to achieve established goals. Alcira may at times be seen by a PTA as part of the Rehab Team.  Plan to begin TI in  order to manage edema then decrease frequency    Nelson Wall PTA, STS

## 2018-03-23 ENCOUNTER — CLINICAL SUPPORT (OUTPATIENT)
Dept: REHABILITATION | Facility: HOSPITAL | Age: 53
End: 2018-03-23
Attending: ORTHOPAEDIC SURGERY
Payer: COMMERCIAL

## 2018-03-23 DIAGNOSIS — M25.562 ACUTE PAIN OF LEFT KNEE: Primary | ICD-10-CM

## 2018-03-23 PROCEDURE — 97110 THERAPEUTIC EXERCISES: CPT

## 2018-03-23 NOTE — PROGRESS NOTES
"[facility]       Date: 03/19/2018  Start Time: 1230  Stop Time:  1330  Visit #: 11    Patient Name: Alcira Selby  Clinic Number: [id number]  Age: 52 y.o.  Gender: female    Diagnosis:   Encounter Diagnoses   Name Primary?    Acute pain of left knee     Difficulty walking        Referring Physician: Stacey Friedman MD  Treatment Orders: PT Eval and Treat      History     No past medical history on file.    No current outpatient prescriptions on file.     No current facility-administered medications for this visit.        Review of patient's allergies indicates:  Allergies not on file      Subjective     Pt reports "pain level about the same". Pain scale  7/10 pain today.      Objective      Pt enters Mod I using SC, antalgic gait pattern with knee flexed, ambulating on toes. Decreased swelling and edema, L knee flexion contracture   PROM L knee flexion 0-120'      Treatment:  - Stationary bike x 10 min for ROM and endurance   -Gait trg - on TM 10' working on improved heel strike/toe off and equal step length   -Bridges 3x10/3"  -prone knee hang 5' 5#  -prone TKE on bolster 30x   -prone knee flexion stretch w/strap 5'   -CP x 10'          Not today  -Slantboard x 1 min  - Hamstring stretch at stair x 1min  -Step ups 6" 3x10  - Star taps x 20 bilateral  - Stair lunge on 2nd step x 20s  - Seated knee flexion/extension with manual resistance  Psoas March with manual resistance x 15  - SAQ x 20  - DKTC x 20  - Standing Hip flexion x 20  - Heel toe walking x 5 min  - Fwd ambulation with arm swing cues x 5 min  -Standing GTB TKE L LE 3x10  -Seated LAQ 3x10  -hip abd side step L/R 1 lap  -step up 6" L lateral 30x   -GSS w/strap 3x/30"  -HSS w/strap 3x/30"                   Pt educated on Compliance with TKE exercise along with Knee flexion HEP.      Assessment    Pt tolerated tx well. Continued improvement in gait patten but still requiring VC for increased hip flexion, " heelstrike and toe off.  Plan to continue to progess as tolerated.    This is a 52 y.o. 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 and demonstrates limitations as described in the problem list. Primary impairments include strength, ROM, edema, gait, balance, and pain which limits functional mobility.  Pt demonstrates good rehab potential. Pt will benefit from physcial therapy services in order to maximize pain free and/or functional use of left LE. The following goals were discussed with the patient and patient is in agreement with them as to be addressed in the treatment plan. Pt was given a HEP consisting. Pt verbally understood the instructions as they were given and demonstrated proper form and technique during therapy. Pt was advised to perform these exercises free of pain, and to stop performing them if pain occurs.     Medical necessity is demonstrated by the following problem list:   - Pain limits function of effected part for all activities  - Unable to participate in daily activities   - Requires skilled supervision to complete and progress HEP  - Fall risk - impaired balance   - Continued inability to participate in vocational pursuits    Short Term Goals (4 Weeks):  - Pt will increase ROM to 0-115 degrees PROM  - Pt will increase strength to perform a SLR without extension lag  - Decrease Pain to 0-6/10 ambulating 150 feet with a straight cane  - Decrease edema by 2CM  - Pt independent with HEP with progressions.     Long Term Goals (8 Weeks):  - Pt will increase ROM to 0-125 degrees AROM  - Pt will increase strength to L knee extnsion to > 4/5  - Decrease Pain to 0-3/10 with community ambulation  - Pt to return to work simulated activity  - Pt to improve FOTO score to >50      Plan     Pt will be treated by physical therapy 2-3 times a week for 10 weeks for manual therapy, therapeutic exercise, home exercise program,  patient education, and modalities PRN to achieve established goals. Angenetta may at times be seen by a PTA as part of the Rehab Team.  Plan to begin TI in order to manage edema then decrease frequency    Nelson Wall PTA, STS

## 2018-03-27 ENCOUNTER — CLINICAL SUPPORT (OUTPATIENT)
Dept: REHABILITATION | Facility: HOSPITAL | Age: 53
End: 2018-03-27
Attending: ORTHOPAEDIC SURGERY
Payer: COMMERCIAL

## 2018-03-27 DIAGNOSIS — M25.562 ACUTE PAIN OF LEFT KNEE: Primary | ICD-10-CM

## 2018-03-27 PROCEDURE — 97110 THERAPEUTIC EXERCISES: CPT

## 2018-03-27 NOTE — PROGRESS NOTES
"[facility]       Date: 03/19/2018  Start Time: 1255  Stop Time:  1355  Visit #: 11    Patient Name: Alcira Selby  Clinic Number: [id number]  Age: 52 y.o.  Gender: female    Diagnosis:   Encounter Diagnoses   Name Primary?    Acute pain of left knee     Difficulty walking        Referring Physician: Stacey Friedman MD  Treatment Orders: PT Eval and Treat      History     No past medical history on file.    No current outpatient prescriptions on file.     No current facility-administered medications for this visit.        Review of patient's allergies indicates:  Allergies not on file      Subjective     Pt reports "pain level about the same". Pain scale  7/10 pain today.      Objective      Pt enters Mod I using SC, antalgic gait pattern with knee flexed, ambulating on toes. Decreased swelling and edema, L knee flexion contracture   PROM L knee flexion 0-120'      Treatment:  - Stationary bike x 10 min for ROM and endurance seat 8  -B leg press 100# 3x10  -heel raises 100# 3x10  -L SL press 60# 3x10  -Cone walking 10x with cane   -L SLR 3x10    -CP x 10'          Not today  -Slantboard x 1 min  - Hamstring stretch at stair x 1min  -Step ups 6" 3x10  - Star taps x 20 bilateral  - Stair lunge on 2nd step x 20s  - Seated knee flexion/extension with manual resistance  Psoas March with manual resistance x 15  - SAQ x 20  - DKTC x 20  - Standing Hip flexion x 20  - Heel toe walking x 5 min  - Fwd ambulation with arm swing cues x 5 min  -Standing GTB TKE L LE 3x10  -Seated LAQ 3x10  -hip abd side step L/R 1 lap  -step up 6" L lateral 30x   -GSS w/strap 3x/30"  -HSS w/strap 3x/30"                   Pt educated on Compliance with TKE exercise along with Knee flexion HEP.      Assessment    Pt tolerated tx well. Continued improvement in gait patten after cone walking . VC for increased hip flexion, heelstrike and toe off. Demonstrated improved B LE strength with increased wt " several exercise.   Plan to continue to progess as tolerated.    This is a 52 y.o. 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 and demonstrates limitations as described in the problem list. Primary impairments include strength, ROM, edema, gait, balance, and pain which limits functional mobility.  Pt demonstrates good rehab potential. Pt will benefit from physcial therapy services in order to maximize pain free and/or functional use of left LE. The following goals were discussed with the patient and patient is in agreement with them as to be addressed in the treatment plan. Pt was given a HEP consisting. Pt verbally understood the instructions as they were given and demonstrated proper form and technique during therapy. Pt was advised to perform these exercises free of pain, and to stop performing them if pain occurs.     Medical necessity is demonstrated by the following problem list:   - Pain limits function of effected part for all activities  - Unable to participate in daily activities   - Requires skilled supervision to complete and progress HEP  - Fall risk - impaired balance   - Continued inability to participate in vocational pursuits    Short Term Goals (4 Weeks):  - Pt will increase ROM to 0-115 degrees PROM  - Pt will increase strength to perform a SLR without extension lag  - Decrease Pain to 0-6/10 ambulating 150 feet with a straight cane  - Decrease edema by 2CM  - Pt independent with HEP with progressions.     Long Term Goals (8 Weeks):  - Pt will increase ROM to 0-125 degrees AROM  - Pt will increase strength to L knee extnsion to > 4/5  - Decrease Pain to 0-3/10 with community ambulation  - Pt to return to work simulated activity  - Pt to improve FOTO score to >50      Plan     Pt will be treated by physical therapy 2-3 times a week for 10 weeks for manual therapy, therapeutic exercise, home exercise program,  patient education, and modalities PRN to achieve established goals. Angenetta may at times be seen by a PTA as part of the Rehab Team.  Plan to begin TI in order to manage edema then decrease frequency    Nelson Wall PTA, STS

## 2018-04-05 ENCOUNTER — DOCUMENTATION ONLY (OUTPATIENT)
Dept: REHABILITATION | Facility: HOSPITAL | Age: 53
End: 2018-04-05

## 2018-04-05 NOTE — PROGRESS NOTES
[facility]   PATIENT EVALUATION      Patient Name: Alcira Selby  Clinic Number: [id number]  Age: 52 y.o.  Gender: female    Diagnosis:   Encounter Diagnoses   Name Primary?    Acute pain of left knee     Difficulty walking        Referring Physician: Stacey Friedman MD  Treatment Orders: PT Eval and Treat      History     No past medical history on file.    No current outpatient prescriptions on file.     No current facility-administered medications for this visit.        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 Original surgery was January 2017. She has been steadily progressing in PT moving from ambulating with a rolling walker at all times to a straight cane.  T    Date of Surgery: 2/15/18  Precautions: None    Mechanism of Injury: Post -op    Pain Location: knee   Pain Description: Aching and Tight  Current Pain: 7/10 (original 9/10)  Least Pain:6/10 (original 7/10)  Worst Pain: 8/10 (original 10/10)  Aggravating Factors: Bending and squatting  Relieving Factors: R.I.C.E    Diagnostic Tests: NA  Prior Therapy: Pt x 11 visits    Occupation: OR tech  Job Status: Not working  Job Duties: Prolonged standing    Sports/Recreational Activities: Community ambulator  Extremity Dominance: Right    Prior Level of Function: Independent  Functional Deficits Leading to Referral/Nature of Injury: Pain with standing ADL, walking, squatting, kneeling, climbing  Patient Therapy Goals: Improve gait and decrease pain with daily activities  Cultural/Environmental/Spiritual Barriers to Treatment or Learning: None      Objective     Observation: Continues to have a mild swelling around the joint  Posture: Flexed knee pattern  Gait: Improved weight shift onto the LLE with the straight cane.    Dermatomes: Intact  DTRs: NT    Palpation: (+) Tenderness  (+)  Medial/lateral joint line LLE  (+) Quadriceps LLE    Range of Motion:     Right Knee: 0-125    Left Knee: 0 - 120 (original 5 -90)    Patella Mobility:   Right Knee: WNL    Left Knee: NT      Flexibility:   Hamstring: Restricted Mod  Hip Flexor: Restricted Mod  Quad: NT  GSS: Restricted Mod  ITB: NT    Strength:   Right Hip  Flexion: 4  Extension: 4  Abduction: 4    Left Hip  Flexion: 3  Extension: 3  Abduction: 3    Right Knee  Flexion: 4  Extension: 4    Left Knee  Flexion: 3+  Extension: 3+    Special Tests: NT secondary to dx  Eli's  Chiquita's  Anterior Drawer  Posterior Drawer  Valgus Laxity  Varus Laxity      Functional Limitations Reports - G Codes  Category: Mobility  Tool: FOTO  Score: 30 (10 on initial evaluation)           Assessment     This is a 52 y.o. 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 and demonstrates limitations as described in the problem list. Primary impairments include strength, ROM, edema, gait, balance, and pain which limits functional mobility. She has attended 11 PT tx sessions demonstrating improvements in strength, ROM, gait, and functional mobility. She has had a slight decrease in pain but continues to struggle with squatting and stair activity. The pt's endurance for ambulation is limited <10 minutes at a time but she has progressed to community ambulation with a straight cane. This pt would continue to benefit from skilled PT in order to improve muscular strength and endurance for an increase in activity tolerance in the home and potentially with vocational demands of prolonged standing.     Medical necessity is demonstrated by the following problem list:   - Pain limits function of effected part for all activities  - Unable to participate in daily activities   - Requires skilled supervision to complete and progress HEP  - Fall risk - impaired balance   - Continued inability to participate  in vocational pursuits    Short Term Goals (4 Weeks):  - Pt will increase ROM to 0-115 degrees PROM - Met  - Pt will increase strength to perform a SLR without extension lag - Ongoing  - Decrease Pain to 0-6/10 ambulating 150 feet with a straight cane - Met  - Decrease edema by 2CM - Met  - Pt independent with HEP with progressions.     Long Term Goals (8 Weeks):  - Pt will increase ROM to 0-125 degrees AROM  - Pt will increase strength to L knee extnsion to > 4/5  - Decrease Pain to 0-3/10 with community ambulation  - Pt to return to work simulated activity  - Pt to improve FOTO score to >50      Plan     Pt will be treated by physical therapy 2-3 times a week for 10 weeks for manual therapy, therapeutic exercise, home exercise program, patient education, and modalities PRN to achieve established goals. Alcira may at times be seen by a PTA as part of the Rehab Team.  Plan to begin TI in order to manage edema then decrease frequency    José Miguel Laughlin, PT, DPT, OCS  02/22/2018    I CERTIFY THE NEED FOR THESE SERVICES FURNISHED UNDER THIS PLAN OF TREATMENT AND WHILE UNDER MY CAR  Physician's comments: _____________________________________________________________________________________________________________________    Physician's Name: ___________________________________

## 2018-04-16 ENCOUNTER — CLINICAL SUPPORT (OUTPATIENT)
Dept: REHABILITATION | Facility: HOSPITAL | Age: 53
End: 2018-04-16
Attending: ORTHOPAEDIC SURGERY
Payer: COMMERCIAL

## 2018-04-16 DIAGNOSIS — M25.562 ACUTE PAIN OF LEFT KNEE: Primary | ICD-10-CM

## 2018-04-16 PROCEDURE — 97110 THERAPEUTIC EXERCISES: CPT

## 2018-04-16 PROCEDURE — 97140 MANUAL THERAPY 1/> REGIONS: CPT

## 2018-04-16 NOTE — PROGRESS NOTES
"[facility]         Patient Name: Alcira Selby  Clinic Number: [id number]  Age: 52 y.o.  Gender: female    Diagnosis:   Encounter Diagnoses   Name Primary?    Acute pain of left knee     Difficulty walking    Time in 1100  Time out 1200     Referring Physician: Stacey Friedman MD  Treatment Orders: PT Eval and Treat      History     No past medical history on file.    No current outpatient prescriptions on file.     No current facility-administered medications for this visit.        Review of patient's allergies indicates:  Allergies not on file      Subjective     Pt reporting mod pain in L Knee when arrived for tx. Stated "Christine been doing a lot of walking". Pain scale 7/10.        Objective     Observation: Continues to have a mild swelling around the joint  Posture: Flexed knee pattern  Gait: Improved weight shift onto the LLE with the straight cane.    Treatment:  Nustep x 10' for increased ROM -only available   Manual therapy- L knee patella MOBS/PROM knee flex/ext and HS roller 15'   B Leg press 100# 3x10  Heel raises 100# 3x10   L SL press 60# 3x10  Trampoline marching 2'   CP x 10'            Assessment   Pt tolerating tx well with decreased pain/stiffness in L knee after manual therapy. VC/Tc for correcting form/technique, quad activation and improved gait pattern. Continue to progress as tolerated.   This is a 52 y.o. 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 and demonstrates limitations as described in the problem list. Primary impairments include strength, ROM, edema, gait, balance, and pain which limits functional mobility. She has attended 11 PT tx sessions demonstrating improvements in strength, ROM, gait, and functional mobility. She has had a slight decrease in pain but continues to struggle with squatting and stair activity. The pt's endurance for ambulation is " limited <10 minutes at a time but she has progressed to community ambulation with a straight cane. This pt would continue to benefit from skilled PT in order to improve muscular strength and endurance for an increase in activity tolerance in the home and potentially with vocational demands of prolonged standing.     Medical necessity is demonstrated by the following problem list:   - Pain limits function of effected part for all activities  - Unable to participate in daily activities   - Requires skilled supervision to complete and progress HEP  - Fall risk - impaired balance   - Continued inability to participate in vocational pursuits    Short Term Goals (4 Weeks):  - Pt will increase ROM to 0-115 degrees PROM - Met  - Pt will increase strength to perform a SLR without extension lag - Ongoing  - Decrease Pain to 0-6/10 ambulating 150 feet with a straight cane - Met  - Decrease edema by 2CM - Met  - Pt independent with HEP with progressions.     Long Term Goals (8 Weeks):  - Pt will increase ROM to 0-125 degrees AROM  - Pt will increase strength to L knee extnsion to > 4/5  - Decrease Pain to 0-3/10 with community ambulation  - Pt to return to work simulated activity  - Pt to improve FOTO score to >50      Plan     Pt will be treated by physical therapy 2-3 times a week for 10 weeks for manual therapy, therapeutic exercise, home exercise program, patient education, and modalities PRN to achieve established goals. Alcira may at times be seen by a PTA as part of the Rehab Team.  Plan to begin TI in order to manage edema then decrease frequency    Nelson Wall PTA, STS

## 2018-04-24 ENCOUNTER — CLINICAL SUPPORT (OUTPATIENT)
Dept: REHABILITATION | Facility: HOSPITAL | Age: 53
End: 2018-04-24
Attending: ORTHOPAEDIC SURGERY
Payer: COMMERCIAL

## 2018-04-24 DIAGNOSIS — R26.2 DIFFICULTY WALKING: ICD-10-CM

## 2018-04-24 DIAGNOSIS — M25.562 ACUTE PAIN OF LEFT KNEE: ICD-10-CM

## 2018-04-24 PROCEDURE — 97110 THERAPEUTIC EXERCISES: CPT

## 2018-04-24 NOTE — PROGRESS NOTES
"[facility]         Patient Name: Alcira Selby  Clinic Number: [id number]  Age: 52 y.o.  Gender: female    Diagnosis:   Encounter Diagnoses   Name Primary?    Acute pain of left knee     Difficulty walking    Time in 100  Time out 200     Referring Physician: Stacey Friedman MD  Treatment Orders: PT Eval and Treat      History     No past medical history on file.    No current outpatient prescriptions on file.     No current facility-administered medications for this visit.        Review of patient's allergies indicates:  Allergies not on file      Subjective     Pt reporting mod pain in L Knee when arrived for tx. Stated "Christine been doing a lot of walking". Pain scale 7/10.  No real change in presentation at this time.          Objective     Observation: Continues to have a mild swelling around the joint  Posture: Flexed knee pattern  Gait: Improved weight shift onto the LLE with the straight cane.    Treatment:  45 min therapeutic exercise  Nustep x 10' for increased ROM -only available   Manual therapy- L knee patella MOBS/PROM knee flex/ext and HS roller 15'   B Leg press 100# 3x10  Heel raises 100# 3x10   L SL press 60# 3x10  Trampoline marching 2'   CP x 10'   Biodex training x 10 minutes   Squats with UE assist behind chair            Assessment   Pt tolerated exercises nicely.  She was placed in the biodex machine today for force plate data capture and it was confirmed that pt deviates away from left knee at the initiating of bilateral knee bending to the extent of 80% wt on the right.  Pt struggled with correcting this dysfunctional weight shift.  She is in constant pain and shifting weight back onto the left side does not increase any symptoms.  We will focus continue treatment on weight acceptance on the left knee and SL devoted exercises.      Medical necessity is demonstrated by the following problem list:   - Pain limits function of effected part for all " activities  - Unable to participate in daily activities   - Requires skilled supervision to complete and progress HEP  - Fall risk - impaired balance   - Continued inability to participate in vocational pursuits    Short Term Goals (4 Weeks):  - Pt will increase ROM to 0-115 degrees PROM - Met  - Pt will increase strength to perform a SLR without extension lag - Ongoing  - Decrease Pain to 0-6/10 ambulating 150 feet with a straight cane - Met  - Decrease edema by 2CM - Met  - Pt independent with HEP with progressions.     Long Term Goals (8 Weeks):  - Pt will increase ROM to 0-125 degrees AROM  - Pt will increase strength to L knee extnsion to > 4/5  - Decrease Pain to 0-3/10 with community ambulation  - Pt to return to work simulated activity  - Pt to improve FOTO score to >50      Plan   Continue physical therapy as planned and progress to SL assisted exercises for weight acceptance and to promote strengthening of left LE.   Pt will be treated by physical therapy 2-3 times a week for 10 weeks for manual therapy, therapeutic exercise, home exercise program, patient education, and modalities PRN to achieve established goals. Alcira may at times be seen by a PTA as part of the Rehab Team.  Plan to begin TI in order to manage edema then decrease frequency    Nabeel Kamlesh PT

## 2018-04-26 ENCOUNTER — CLINICAL SUPPORT (OUTPATIENT)
Dept: REHABILITATION | Facility: HOSPITAL | Age: 53
End: 2018-04-26
Attending: ORTHOPAEDIC SURGERY
Payer: COMMERCIAL

## 2018-04-26 DIAGNOSIS — M25.562 ACUTE PAIN OF LEFT KNEE: Primary | ICD-10-CM

## 2018-04-26 PROCEDURE — 97140 MANUAL THERAPY 1/> REGIONS: CPT

## 2018-04-26 PROCEDURE — 97110 THERAPEUTIC EXERCISES: CPT

## 2018-04-26 NOTE — PROGRESS NOTES
"[facility] PH[facility]         Patient Name: Alcira Selby  Clinic Number: [id number]  Age: 52 y.o.  Gender: female    Diagnosis:   Encounter Diagnoses   Name Primary?    Acute pain of left knee     Difficulty walking    Time in 100  Time out 200     Referring Physician: Stacey Friedman MD  Treatment Orders: PT Eval and Treat      History     No past medical history on file.    No current outpatient prescriptions on file.     No current facility-administered medications for this visit.        Review of patient's allergies indicates:  Allergies not on file      Subjective     Pt reporting mod pain in L Knee when arrived for tx. Stated "Christine been doing a lot of walking". Pain scale 7/10.  No real change in presentation at this time.          Objective     Observation: Continues to have a mild swelling around the joint  Posture: Flexed knee pattern  Gait: Improved weight shift onto the LLE with the straight cane.    Treatment:  45 min therapeutic exercise  Nustep x 10' for increased ROM -only available   Manual therapy- L knee patella MOBS/PROM knee flex/ext  10'   L quad roller 5'  Biodex training x 10 minutes   B Leg press 100# 3x10  Heel raises 100# 3x10   CP x 10'         Not today  L SL press 60# 3x10  Trampoline marching 2'   Squats with UE assist behind chair            Assessment   Pt tolerated exercises well. Continued work with biodex for equal weight distribution with squatting and standing on L LE.      Medical necessity is demonstrated by the following problem list:   - Pain limits function of effected part for all activities  - Unable to participate in daily activities   - Requires skilled supervision to complete and progress HEP  - Fall risk - impaired balance   - Continued inability to participate in vocational pursuits    Short Term Goals (4 Weeks):  - Pt will increase ROM to 0-115 degrees PROM - Met  - Pt will increase strength to perform a SLR without extension lag - " Ongoing  - Decrease Pain to 0-6/10 ambulating 150 feet with a straight cane - Met  - Decrease edema by 2CM - Met  - Pt independent with HEP with progressions.     Long Term Goals (8 Weeks):  - Pt will increase ROM to 0-125 degrees AROM  - Pt will increase strength to L knee extnsion to > 4/5  - Decrease Pain to 0-3/10 with community ambulation  - Pt to return to work simulated activity  - Pt to improve FOTO score to >50      Plan   Continue physical therapy as planned and progress to SL assisted exercises for weight acceptance and to promote strengthening of left LE.   Pt will be treated by physical therapy 2-3 times a week for 10 weeks for manual therapy, therapeutic exercise, home exercise program, patient education, and modalities PRN to achieve established goals. Alcira may at times be seen by a PTA as part of the Rehab Team.  Plan to begin TI in order to manage edema then decrease frequency    Nelson Wall PTA, STS

## 2018-05-02 ENCOUNTER — CLINICAL SUPPORT (OUTPATIENT)
Dept: REHABILITATION | Facility: HOSPITAL | Age: 53
End: 2018-05-02
Attending: ORTHOPAEDIC SURGERY
Payer: COMMERCIAL

## 2018-05-02 DIAGNOSIS — M25.562 ACUTE PAIN OF LEFT KNEE: Primary | ICD-10-CM

## 2018-05-02 PROCEDURE — 97110 THERAPEUTIC EXERCISES: CPT

## 2018-05-02 PROCEDURE — 97140 MANUAL THERAPY 1/> REGIONS: CPT

## 2018-05-02 NOTE — PROGRESS NOTES
[facility]       Date: 02/22/2018  Start Time: 1305  Stop Time:  1400  Visit #: 8    Patient Name: Alcira Selby  Clinic Number: [id number]  Age: 52 y.o.  Gender: female    Diagnosis:   Encounter Diagnoses   Name Primary?    Acute pain of left knee     Difficulty walking        Referring Physician: Stacey Friedman MD  Treatment Orders: PT Eval and Treat      History     No past medical history on file.    No current outpatient prescriptions on file.     No current facility-administered medications for this visit.        Review of patient's allergies indicates:  Allergies not on file      Subjective     Pt states reporting that her knee is getting better but still having medial and lateral knee pain > at night. Pain scale 6/10      Objective      Pt enters Mod I using SC,  Decreased swelling and edema, and improved knee ext but still lacking TKE.   PROM L knee flexion 105'       Treatment:   -Stationary bike 10' full revolution Seat level 6 for increased ROM - lower seat   --Manual therapy  patella MOBS/PROM/and over pressure ext 15'  -Prone knee hang 5 min 3#  -Slantboard x 3 min  - L Hamstring stretch at stair x 2 min  -L LE stair lung 3x10  - Stair HR 3x10  - pt refused RICE     Not today   - Star taps x 20 bilateral  - Stair lunge on 2nd step x 20  - Heel toe walking in ll bars x 5 min  - Fwd/retro walking in llbars  - Seated knee flexion/extension with manual resistance                 Pt educated on Compliance with TKE exercise along with Knee flexion HEP.      Assessment    Pt continues to show good progression ambulating with the straight cane.  Improved extension L knee but lacking TKE.  VC/TC for correcting form/technique with therex. Continue to progress as tolerated.   This is a 52 y.o. 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 and demonstrates limitations as described in  the problem list. Primary impairments include strength, ROM, edema, gait, balance, and pain which limits functional mobility.  Pt demonstrates good rehab potential. Pt will benefit from physcial therapy services in order to maximize pain free and/or functional use of left LE. The following goals were discussed with the patient and patient is in agreement with them as to be addressed in the treatment plan. Pt was given a HEP consisting. Pt verbally understood the instructions as they were given and demonstrated proper form and technique during therapy. Pt was advised to perform these exercises free of pain, and to stop performing them if pain occurs.     Medical necessity is demonstrated by the following problem list:   - Pain limits function of effected part for all activities  - Unable to participate in daily activities   - Requires skilled supervision to complete and progress HEP  - Fall risk - impaired balance   - Continued inability to participate in vocational pursuits    Short Term Goals (4 Weeks):  - Pt will increase ROM to 0-115 degrees PROM  - Pt will increase strength to perform a SLR without extension lag  - Decrease Pain to 0-6/10 ambulating 150 feet with a straight cane  - Decrease edema by 2CM  - Pt independent with HEP with progressions.     Long Term Goals (8 Weeks):  - Pt will increase ROM to 0-125 degrees AROM  - Pt will increase strength to L knee extnsion to > 4/5  - Decrease Pain to 0-3/10 with community ambulation  - Pt to return to work simulated activity  - Pt to improve FOTO score to >50      Plan     Pt will be treated by physical therapy 2-3 times a week for 10 weeks for manual therapy, therapeutic exercise, home exercise program, patient education, and modalities PRN to achieve established goals. Blayneurielsysaeid may at times be seen by a PTA as part of the Rehab Team.  Plan to begin TI in order to manage edema then decrease frequency    Nelson Wall PTA, STS

## 2018-05-04 ENCOUNTER — CLINICAL SUPPORT (OUTPATIENT)
Dept: REHABILITATION | Facility: HOSPITAL | Age: 53
End: 2018-05-04
Attending: ORTHOPAEDIC SURGERY
Payer: COMMERCIAL

## 2018-05-04 DIAGNOSIS — M25.562 ACUTE PAIN OF LEFT KNEE: Primary | ICD-10-CM

## 2018-05-04 PROCEDURE — 97140 MANUAL THERAPY 1/> REGIONS: CPT

## 2018-05-04 PROCEDURE — 97110 THERAPEUTIC EXERCISES: CPT

## 2018-05-04 NOTE — PROGRESS NOTES
[facility]       Date: 02/22/2018  Start Time: 1225  Stop Time:  1325  Visit #: 18    Patient Name: Alcira Selby  Clinic Number: [id number]  Age: 52 y.o.  Gender: female    Diagnosis:   Encounter Diagnoses   Name Primary?    Acute pain of left knee     Difficulty walking        Referring Physician: Stacey Friedman MD  Treatment Orders: PT Eval and Treat      History     No past medical history on file.    No current outpatient prescriptions on file.     No current facility-administered medications for this visit.        Review of patient's allergies indicates:  Allergies not on file      Subjective     Pt states reporting mod pain in L knee when arrived for tx.  Pain scale 6/10      Objective      Pt enters Mod I using SC,  Decreased swelling and edema, and improved knee ext but still lacking TKE.   PROM L knee flexion 105'       Treatment:   -TM 1.5 10' VC for gait pattern   -GSS slant 1'   -L LE stair lung 3x10  -Leg press 100# 3x10   -SL press L 80# 3x10   -L knee flexor stretch on hammer strength machine 15# 5'  -Manual therapy  -patella MOBS/PROM/and over pressure ext 15'  -Prone knee hang 5 min 3#      Not today   - Star taps x 20 bilateral  - Stair lunge on 2nd step x 20  - Heel toe walking in ll bars x 5 min  - Fwd/retro walking in llbars  - Seated knee flexion/extension with manual resistance  - L Hamstring stretch at stair x 2 min  - Stair HR 3x10  - pt refused RICE               Pt educated on Compliance with TKE exercise along with Knee flexion HEP.      Assessment    Pt continues to show good progression ambulating with the straight cane.  Improved extension L knee but lacking TKE, continue with knee flexor stretching.  VC/TC for correcting form/technique with therex. Continue to progress as tolerated.   This is a 52 y.o. 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 and  demonstrates limitations as described in the problem list. Primary impairments include strength, ROM, edema, gait, balance, and pain which limits functional mobility.  Pt demonstrates good rehab potential. Pt will benefit from physcial therapy services in order to maximize pain free and/or functional use of left LE. The following goals were discussed with the patient and patient is in agreement with them as to be addressed in the treatment plan. Pt was given a HEP consisting. Pt verbally understood the instructions as they were given and demonstrated proper form and technique during therapy. Pt was advised to perform these exercises free of pain, and to stop performing them if pain occurs.     Medical necessity is demonstrated by the following problem list:   - Pain limits function of effected part for all activities  - Unable to participate in daily activities   - Requires skilled supervision to complete and progress HEP  - Fall risk - impaired balance   - Continued inability to participate in vocational pursuits    Short Term Goals (4 Weeks):  - Pt will increase ROM to 0-115 degrees PROM  - Pt will increase strength to perform a SLR without extension lag  - Decrease Pain to 0-6/10 ambulating 150 feet with a straight cane  - Decrease edema by 2CM  - Pt independent with HEP with progressions.     Long Term Goals (8 Weeks):  - Pt will increase ROM to 0-125 degrees AROM  - Pt will increase strength to L knee extnsion to > 4/5  - Decrease Pain to 0-3/10 with community ambulation  - Pt to return to work simulated activity  - Pt to improve FOTO score to >50      Plan     Pt will be treated by physical therapy 2-3 times a week for 10 weeks for manual therapy, therapeutic exercise, home exercise program, patient education, and modalities PRN to achieve established goals. Alcira may at times be seen by a PTA as part of the Rehab Team.  Plan to begin TI in order to manage edema then decrease frequency    Nelson Wall  PTA, STS

## 2018-05-09 ENCOUNTER — CLINICAL SUPPORT (OUTPATIENT)
Dept: REHABILITATION | Facility: HOSPITAL | Age: 53
End: 2018-05-09
Attending: ORTHOPAEDIC SURGERY
Payer: COMMERCIAL

## 2018-05-09 DIAGNOSIS — M25.562 ACUTE PAIN OF LEFT KNEE: Primary | ICD-10-CM

## 2018-05-09 PROCEDURE — 97140 MANUAL THERAPY 1/> REGIONS: CPT

## 2018-05-09 PROCEDURE — 97110 THERAPEUTIC EXERCISES: CPT

## 2018-05-09 NOTE — PROGRESS NOTES
[facility]       Date: 02/22/2018  Start Time: 1355  Stop Time:  1455  Visit #: 18    Patient Name: Alcira Selby  Clinic Number: [id number]  Age: 52 y.o.  Gender: female    Diagnosis:   Encounter Diagnoses   Name Primary?    Acute pain of left knee     Difficulty walking        Referring Physician: Stacey Friedman MD  Treatment Orders: PT Eval and Treat      History     No past medical history on file.    No current outpatient prescriptions on file.     No current facility-administered medications for this visit.        Review of patient's allergies indicates:  Allergies not on file      Subjective     Pt states reporting mod pain in L knee when arrived for tx.  Pain scale 6/10      Objective      Pt enters Mod I using SC,  Decreased swelling and edema, and improved knee ext but still lacking TKE.    PROM L knee flexion 115'   MMT 4-/5 knee flex/ext  Instructed to start ambulating w/o SC      Treatment:   -TM 1.6 10' VC for gait pattern   -GSS slant 1'   -L LE stair lung 3x10  -Leg press 100# 3x10   -SL press L 80# 3x10   -L knee flexor stretch on hammer strength machine 20# 5'  -L knee hammer machine ext 3x10 0#   -Manual therapy  -patella MOBS/PROM/and over pressure ext 10'  -Prone knee hang 5 min 3#        CP x 10' L knee       Not today   - Star taps x 20 bilateral  - Stair lunge on 2nd step x 20  - Heel toe walking in ll bars x 5 min  - Fwd/retro walking in llbars  - Seated knee flexion/extension with manual resistance  - L Hamstring stretch at stair x 2 min  - Stair HR 3x10                 Pt educated on Compliance with TKE exercise along with Knee flexion HEP.      Assessment    Pt tolerating tx well. Progressing in function gait w/o AD.  Continues with Improved extension L knee with juan daniel ext lag and progressing well with knee flexioin. Pt to continue with knee flexor stretching to achieve TKE and eliminate limp in gait. Pain level still inconsistent with  functional level.   VC/TC for correcting form/technique with therex. Continue to progress as tolerated.   This is a 52 y.o. 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 and demonstrates limitations as described in the problem list. Primary impairments include strength, ROM, edema, gait, balance, and pain which limits functional mobility.  Pt demonstrates good rehab potential. Pt will benefit from physcial therapy services in order to maximize pain free and/or functional use of left LE. The following goals were discussed with the patient and patient is in agreement with them as to be addressed in the treatment plan. Pt was given a HEP consisting. Pt verbally understood the instructions as they were given and demonstrated proper form and technique during therapy. Pt was advised to perform these exercises free of pain, and to stop performing them if pain occurs.     Medical necessity is demonstrated by the following problem list:   - Pain limits function of effected part for all activities  - Unable to participate in daily activities   - Requires skilled supervision to complete and progress HEP  - Fall risk - impaired balance   - Continued inability to participate in vocational pursuits    Short Term Goals (4 Weeks):  - Pt will increase ROM to 0-115 degrees PROM  - Pt will increase strength to perform a SLR without extension lag  - Decrease Pain to 0-6/10 ambulating 150 feet with a straight cane  - Decrease edema by 2CM  - Pt independent with HEP with progressions.     Long Term Goals (8 Weeks):  - Pt will increase ROM to 0-125 degrees AROM  - Pt will increase strength to L knee extnsion to > 4/5  - Decrease Pain to 0-3/10 with community ambulation  - Pt to return to work simulated activity  - Pt to improve FOTO score to >50      Plan     Pt will be treated by physical therapy 2-3 times a week for 10 weeks for manual therapy,  therapeutic exercise, home exercise program, patient education, and modalities PRN to achieve established goals. [place] may at times be seen by a PTA as part of the Rehab Team.  Plan to begin TI in order to manage edema then decrease frequency    Nelson Wall PTA, STS

## 2018-05-11 ENCOUNTER — CLINICAL SUPPORT (OUTPATIENT)
Dept: REHABILITATION | Facility: HOSPITAL | Age: 53
End: 2018-05-11
Attending: ORTHOPAEDIC SURGERY
Payer: COMMERCIAL

## 2018-05-11 DIAGNOSIS — R26.2 DIFFICULTY WALKING: ICD-10-CM

## 2018-05-11 DIAGNOSIS — M25.562 ACUTE PAIN OF LEFT KNEE: ICD-10-CM

## 2018-05-11 PROCEDURE — 97110 THERAPEUTIC EXERCISES: CPT

## 2018-05-11 NOTE — PROGRESS NOTES
[facility]       Date: 02/22/2018  Start Time: 1100  Stop Time:  1200  Visit #: 18    Patient Name: Alcira Selby  Clinic Number: [id number]  Age: 52 y.o.  Gender: female    Diagnosis:   Encounter Diagnoses   Name Primary?    Acute pain of left knee     Difficulty walking        Referring Physician: Stacey Friedman MD  Treatment Orders: PT Eval and Treat      Subjective     Pt states she still has moderate pain in her L knee limiting her standing and walking tolerance. States she has moderate difficulty with household activities such as cleaning and cooking. States she has difficulty with stairs having to go one at a time and is unable to get into a low vehicle    Pain scale 6/10      Objective      Pt enters Mod I using SC,  Decreased swelling and edema, and improved knee ext but still lacking TKE.    PROM L knee flexion 115' , ext 0 deg  MMT 4-/5 knee flex/ext        Treatment:   -TM 1.6 10' VC for gait pattern -np  Recumbant bike 10'  -GSS slant 1'   -L LE stair lung 3x10  -Leg press 100# 3x10   -SL press L 80# 3x10   -L knee ext stretch on hammer strength machine 20# 5'  -L knee hammer machine ext 3x10 0#   -Manual therapy  -patella MOBS/PROM/and over pressure ext 10'  -Prone knee hang 5 min 3#  -np  CP x 10' L knee        Pt educated on Compliance with TKE exercise along with Knee flexion HEP.      Assessment   Progressing in functional gait w/o AD but still has functional limitations in ADL's due to pain. Patient continues to have pain slowing treatment progression but has improved in knee strength and advanced gait to I. Pt to continue with knee extensor stretching to achieve TKE and eliminate limp in gait.  Continue to progress as tolerated.     This is a 52 y.o. 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 and demonstrates limitations as described in the problem list. Primary  impairments include strength, ROM, edema, gait, balance, and pain which limits functional mobility.  Pt demonstrates good rehab potential. Pt will benefit from physcial therapy services in order to maximize pain free and/or functional use of left LE. The following goals were discussed with the patient and patient is in agreement with them as to be addressed in the treatment plan.     Medical necessity is demonstrated by the following problem list:   - Pain limits function of effected part for all activities  - Unable to participate in daily activities   - Requires skilled supervision to complete and progress HEP  - Fall risk - impaired balance   - Continued inability to participate in vocational pursuits    Short Term Goals (4 Weeks):  - Pt will increase ROM to 0-115 degrees PROM (met)  - Pt will increase strength to perform a SLR without extension lag  - Decrease Pain to 0-6/10 ambulating 150 feet with a straight cane  - Decrease edema by 2CM  - Pt independent with HEP with progressions.     Long Term Goals (8 Weeks):  - Pt will increase ROM to 0-125 degrees AROM  - Pt will increase strength to L knee extnsion to > 4/5  - Decrease Pain to 0-3/10 with community ambulation  - Pt to return to work simulated activity  - Pt to improve FOTO score to >50      Plan     Pt will be treated by physical therapy 2 times a week for 8 weeks for manual therapy, therapeutic exercise, home exercise program, patient education, and modalities PRN to achieve established goals. Alcira may at times be seen by a PTA as part of the Rehab Team.  Plan to begin TI in order to manage edema then decrease frequency

## 2018-05-16 ENCOUNTER — CLINICAL SUPPORT (OUTPATIENT)
Dept: REHABILITATION | Facility: HOSPITAL | Age: 53
End: 2018-05-16
Attending: ORTHOPAEDIC SURGERY
Payer: COMMERCIAL

## 2018-05-16 DIAGNOSIS — M25.562 ACUTE PAIN OF LEFT KNEE: Primary | ICD-10-CM

## 2018-05-16 PROCEDURE — 97110 THERAPEUTIC EXERCISES: CPT

## 2018-05-16 PROCEDURE — 97140 MANUAL THERAPY 1/> REGIONS: CPT

## 2018-05-16 NOTE — PROGRESS NOTES
"[facility]       Date: 02/22/2018  Start Time: 1300  Stop Time:  1400  Visit #: 19    Patient Name: Alcira Selby  Clinic Number: [id number]  Age: 52 y.o.  Gender: female    Diagnosis:   Encounter Diagnoses   Name Primary?    Acute pain of left knee     Difficulty walking        Referring Physician: Stacey Friedman MD  Treatment Orders: PT Eval and Treat      History     No past medical history on file.    No current outpatient prescriptions on file.     No current facility-administered medications for this visit.        Review of patient's allergies indicates:  Allergies not on file      Subjective     Pt states "feeling better", with moderate L knee pain at present time. Compliant with Rice bag on L knee to assist in TKE.   Pain scale 6/10      Objective      Pt enters Mod I using SC,  Decreased swelling and edema, and improved knee ext but still lacking TKE.      PROM L knee 0-130', AROM 1-120'   MMT 4+/5 knee flex/ext        Treatment:   -TM 1.6 10' VC for gait pattern.  -Manual therapy  -patella MOBS/PROM/and over pressure ext 10'  -Leg press 100# 3x10   -SL press L 80# 3x10   -GSS slant 1'   -Prone knee hang 5 min 3#       -L LE stair lung 3x10 np  -L knee ext stretch on hammer strength machine 20# 5' np  -L knee hammer machine ext 3x10 0#  np    CP x 10' L knee        Pt educated on Compliance with TKE exercise along with Knee flexion HEP.      Assessment      Pt tolerating tx well. Demonstrating improved L quad strength along with improved Active and Passive ROM.  Pt to continue with knee extensor  stretching to achieve TKE and eliminate limp in gait. Pain level still inconsistent with functional level.  VC/TC for correcting form/technique with therex. Continue to progress as tolerated.   This is a 52 y.o. 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 and demonstrates limitations " as described in the problem list. Primary impairments include strength, ROM, edema, gait, balance, and pain which limits functional mobility.  Pt demonstrates good rehab potential. Pt will benefit from physcial therapy services in order to maximize pain free and/or functional use of left LE. The following goals were discussed with the patient and patient is in agreement with them as to be addressed in the treatment plan. Pt was given a HEP consisting. Pt verbally understood the instructions as they were given and demonstrated proper form and technique during therapy. Pt was advised to perform these exercises free of pain, and to stop performing them if pain occurs.     Medical necessity is demonstrated by the following problem list:   - Pain limits function of effected part for all activities  - Unable to participate in daily activities   - Requires skilled supervision to complete and progress HEP  - Fall risk - impaired balance   - Continued inability to participate in vocational pursuits    Short Term Goals (4 Weeks):  - Pt will increase ROM to 0-115 degrees PROM  - Pt will increase strength to perform a SLR without extension lag  - Decrease Pain to 0-6/10 ambulating 150 feet with a straight cane  - Decrease edema by 2CM  - Pt independent with HEP with progressions.     Long Term Goals (8 Weeks):  - Pt will increase ROM to 0-125 degrees AROM  - Pt will increase strength to L knee extnsion to > 4/5  - Decrease Pain to 0-3/10 with community ambulation  - Pt to return to work simulated activity  - Pt to improve FOTO score to >50      Plan     Pt will be treated by physical therapy 2-3 times a week for 10 weeks for manual therapy, therapeutic exercise, home exercise program, patient education, and modalities PRN to achieve established goals. Alcira may at times be seen by a PTA as part of the Rehab Team.  Plan to begin TI in order to manage edema then decrease frequency          Nelson Wall PTA, STS

## 2018-05-21 ENCOUNTER — CLINICAL SUPPORT (OUTPATIENT)
Dept: REHABILITATION | Facility: HOSPITAL | Age: 53
End: 2018-05-21
Attending: ORTHOPAEDIC SURGERY
Payer: COMMERCIAL

## 2018-05-21 DIAGNOSIS — M25.562 ACUTE PAIN OF LEFT KNEE: Primary | ICD-10-CM

## 2018-05-21 PROCEDURE — 97140 MANUAL THERAPY 1/> REGIONS: CPT

## 2018-05-21 PROCEDURE — 97110 THERAPEUTIC EXERCISES: CPT

## 2018-05-21 NOTE — PROGRESS NOTES
[facility]       Date: 02/22/2018  Start Time: 1300  Stop Time:  1400  Visit #: 22    Patient Name: Alcira Selby  Clinic Number: [id number]  Age: 52 y.o.  Gender: female    Diagnosis:   Encounter Diagnoses   Name Primary?    Acute pain of left knee     Difficulty walking        Referring Physician: Stacey Friedman MD  Treatment Orders: PT Eval and Treat      History     No past medical history on file.    No current outpatient prescriptions on file.     No current facility-administered medications for this visit.        Review of patient's allergies indicates:  Allergies not on file      Subjective     Pt reporting L knee is feeling better but still noted mod pain when arrived for tx. Continued Compliance with Rice bag on L knee to assist in TKE.   Pain scale 6/10      Objective      Pt enters Mod I using SC,  Decreased swelling and edema, and improved knee ext but still lacking TKE.      PROM L knee 0-130', AROM 1-120'   MMT 4+/5 knee flex/ext        Treatment:   -TM 2.1 10' VC for gait pattern.  -Manual therapy  -patella MOBS/min distraction flexion/ext and kinesiotape L knee for swelling  10'  -Leg press 100# 3x10   -SL press L 80# 3x10   -GSS slant 1'   B HSS on step 1' ea  -Prone knee hang 5 min 3#   -L LE stair lung 3x10 np    -L knee ext stretch on hammer strength machine 20# 5'   -L knee ext stretch on hammer strength machine 20# 5'   -L knee hammer machine ext 3x10 0#  np    CP x 10' L knee        Pt educated on Compliance with TKE exercise along with Knee flexion HEP.      Assessment      Pt tolerating tx well.  Pt to continue with knee extensor stretching to achieve TKE and eliminate limp in gait. Pain level still inconsistent with functional level.  VC/TC for correcting form/technique with therex. Continue to progress as tolerated.   This is a 52 y.o. female referred to outpatient physical therapy and presents with a medical diagnosis of s/p left total kne  revision on 2/15/2018. and demonstrates limitations as described in the problem list. Primary impairments include strength, ROM, edema, gait, balance, and pain which limits functional mobility.  Pt demonstrates good rehab potential. Pt will benefit from physcial therapy services in order to maximize pain free and/or functional use of left LE. The following goals were discussed with the patient and patient is in agreement with them as to be addressed in the treatment plan. Pt was given a HEP consisting. Pt verbally understood the instructions as they were given and demonstrated proper form and technique during therapy. Pt was advised to perform these exercises free of pain, and to stop performing them if pain occurs.     Medical necessity is demonstrated by the following problem list:   - Pain limits function of effected part for all activities  - Unable to participate in daily activities   - Requires skilled supervision to complete and progress HEP  - Fall risk - impaired balance   - Continued inability to participate in vocational pursuits    Short Term Goals (4 Weeks):  - Pt will increase ROM to 0-115 degrees PROM  - Pt will increase strength to perform a SLR without extension lag  - Decrease Pain to 0-6/10 ambulating 150 feet with a straight cane  - Decrease edema by 2CM  - Pt independent with HEP with progressions.     Long Term Goals (8 Weeks):  - Pt will increase ROM to 0-125 degrees AROM  - Pt will increase strength to L knee extnsion to > 4/5  - Decrease Pain to 0-3/10 with community ambulation  - Pt to return to work simulated activity  - Pt to improve FOTO score to >50      Plan     Pt will be treated by physical therapy 2-3 times a week for 10 weeks for manual therapy, therapeutic exercise, home exercise program, patient education, and modalities PRN to achieve established goals. Alcira may at times be seen by a PTA as part of the Rehab Team.  Plan to begin TI in order to manage edema then decrease  frequency          Nelson Wall PTA, STS

## 2018-05-23 ENCOUNTER — CLINICAL SUPPORT (OUTPATIENT)
Dept: REHABILITATION | Facility: HOSPITAL | Age: 53
End: 2018-05-23
Attending: ORTHOPAEDIC SURGERY
Payer: COMMERCIAL

## 2018-05-23 DIAGNOSIS — M25.562 ACUTE PAIN OF LEFT KNEE: Primary | ICD-10-CM

## 2018-05-23 PROCEDURE — 97110 THERAPEUTIC EXERCISES: CPT

## 2018-05-23 PROCEDURE — 97140 MANUAL THERAPY 1/> REGIONS: CPT

## 2018-05-23 NOTE — PROGRESS NOTES
"[facility]       Date: 02/22/2018  Start Time: 1300  Stop Time:  1400  Visit #: 23    Patient Name: Alcira Selby  Clinic Number: [id number]  Age: 52 y.o.  Gender: female    Diagnosis:   Encounter Diagnoses   Name Primary?    Acute pain of left knee     Difficulty walking        Referring Physician: Stacey Friedman MD  Treatment Orders: PT Eval and Treat      History     No past medical history on file.    No current outpatient prescriptions on file.     No current facility-administered medications for this visit.        Review of patient's allergies indicates:  Allergies not on file      Subjective     Pt reporting L knee is feeling better. Continued Compliance with Rice bag on L knee to assist in TKE.   Pain scale 6/10      Objective      Pt enters Mod I using SC,  Decreased swelling and edema, and improved knee ext but still lacking TKE.      PROM L knee 0-130', AROM 1-120'   MMT 4+/5 knee flex/ext        Treatment:   -TM 2.1 10' VC for gait pattern.  -Manual therapy  -patella MOBS/min distraction flexion/ext and kinesiotape L knee for swelling  15'  -LAQ 3x 10/3"  -GSS slant 1'   -L LE stair lung 3x10   -L knee ext stretch on hammer strength machine 20# 5'           Not today  -Leg press 100# 3x10   -SL press L 80# 3x10   -B HSS on step 1' ea  -L knee hammer machine ext 3x10 0#  np           Pt educated on Compliance with TKE exercise along with Knee flexion HEP.      Assessment      Pt tolerating tx well.  Pt with good results with kinesiotape. Pain level still inconsistent with functional level.  VC/TC for correcting form/technique with therex. Continue to progress as tolerated.   This is a 52 y.o. 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 and demonstrates limitations as described in the problem list. Primary impairments include strength, ROM, edema, gait, balance, and pain " which limits functional mobility.  Pt demonstrates good rehab potential. Pt will benefit from physcial therapy services in order to maximize pain free and/or functional use of left LE. The following goals were discussed with the patient and patient is in agreement with them as to be addressed in the treatment plan. Pt was given a HEP consisting. Pt verbally understood the instructions as they were given and demonstrated proper form and technique during therapy. Pt was advised to perform these exercises free of pain, and to stop performing them if pain occurs.     Medical necessity is demonstrated by the following problem list:   - Pain limits function of effected part for all activities  - Unable to participate in daily activities   - Requires skilled supervision to complete and progress HEP  - Fall risk - impaired balance   - Continued inability to participate in vocational pursuits    Short Term Goals (4 Weeks):  - Pt will increase ROM to 0-115 degrees PROM  - Pt will increase strength to perform a SLR without extension lag  - Decrease Pain to 0-6/10 ambulating 150 feet with a straight cane  - Decrease edema by 2CM  - Pt independent with HEP with progressions.     Long Term Goals (8 Weeks):  - Pt will increase ROM to 0-125 degrees AROM  - Pt will increase strength to L knee extnsion to > 4/5  - Decrease Pain to 0-3/10 with community ambulation  - Pt to return to work simulated activity  - Pt to improve FOTO score to >50      Plan     Pt will be treated by physical therapy 2-3 times a week for 10 weeks for manual therapy, therapeutic exercise, home exercise program, patient education, and modalities PRN to achieve established goals. Alcira may at times be seen by a PTA as part of the Rehab Team.  Plan to begin TI in order to manage edema then decrease frequency          Nelson Wall PTA, STS

## 2018-06-06 ENCOUNTER — CLINICAL SUPPORT (OUTPATIENT)
Dept: REHABILITATION | Facility: HOSPITAL | Age: 53
End: 2018-06-06
Attending: ORTHOPAEDIC SURGERY
Payer: COMMERCIAL

## 2018-06-06 DIAGNOSIS — M25.562 ACUTE PAIN OF LEFT KNEE: Primary | ICD-10-CM

## 2018-06-06 PROCEDURE — 97140 MANUAL THERAPY 1/> REGIONS: CPT

## 2018-06-06 PROCEDURE — 97110 THERAPEUTIC EXERCISES: CPT

## 2018-06-06 NOTE — PROGRESS NOTES
"[facility]       Date: 02/22/2018  Start Time: 1355  Stop Time:  1455  Visit #: 23    Patient Name: Alcira Selby  Clinic Number: [id number]  Age: 52 y.o.  Gender: female    Diagnosis:   Encounter Diagnoses   Name Primary?    Acute pain of left knee     Difficulty walking        Referring Physician: Stacey Friedman MD  Treatment Orders: PT Eval and Treat      History     No past medical history on file.    No current outpatient prescriptions on file.     No current facility-administered medications for this visit.        Review of patient's allergies indicates:  Allergies not on file      Subjective     Pt reporting L knee is feeling better and "tape is helping me".   Pain scale 5/10      Objective      Pt enters Mod I using SC,  Decreased swelling and edema, and improved knee ext but still lacking TKE.       AROM 1-124'  MMT 4+/5 knee flex/ext    Treatment:   -elliptical 10' for increased ROM   -Manual therapy  -patella MOBS/min distraction flexion/ext and kinesiotape L knee for swelling  15'  -marching on foam 3'   -Standing B  GTB hip abd 3x10  -Standing B GTB hip ext 45' 3x10  -LAQ 3x 10/3"  -GSS with strap 3x/30"  -HSS with strap 3x/30"            Not today  -Leg press 100# 3x10   -SL press L 80# 3x10   -L knee hammer machine ext 3x10 0#  np  -L LE stair lung 3x10   -L knee ext stretch on hammer strength machine 20# 5'          Pt educated on Compliance with TKE exercise along with Knee flexion HEP.      Assessment      Pt tolerating tx well. Continues to have good results with kinesio tape for pain relief and edema. Noted decreased pain. Improved AROM L knee flexion.  VC/TC for correcting form/technique with therex. Continue to progress as tolerated.   This is a 52 y.o. 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 and demonstrates limitations as described in the problem list. " Primary impairments include strength, ROM, edema, gait, balance, and pain which limits functional mobility.  Pt demonstrates good rehab potential. Pt will benefit from physcial therapy services in order to maximize pain free and/or functional use of left LE. The following goals were discussed with the patient and patient is in agreement with them as to be addressed in the treatment plan. Pt was given a HEP consisting. Pt verbally understood the instructions as they were given and demonstrated proper form and technique during therapy. Pt was advised to perform these exercises free of pain, and to stop performing them if pain occurs.     Medical necessity is demonstrated by the following problem list:   - Pain limits function of effected part for all activities  - Unable to participate in daily activities   - Requires skilled supervision to complete and progress HEP  - Fall risk - impaired balance   - Continued inability to participate in vocational pursuits    Short Term Goals (4 Weeks):  - Pt will increase ROM to 0-115 degrees PROM  - Pt will increase strength to perform a SLR without extension lag  - Decrease Pain to 0-6/10 ambulating 150 feet with a straight cane  - Decrease edema by 2CM  - Pt independent with HEP with progressions.     Long Term Goals (8 Weeks):  - Pt will increase ROM to 0-125 degrees AROM  - Pt will increase strength to L knee extnsion to > 4/5  - Decrease Pain to 0-3/10 with community ambulation  - Pt to return to work simulated activity  - Pt to improve FOTO score to >50      Plan     Pt will be treated by physical therapy 2-3 times a week for 10 weeks for manual therapy, therapeutic exercise, home exercise program, patient education, and modalities PRN to achieve established goals. Alcira may at times be seen by a PTA as part of the Rehab Team.  Plan to begin TI in order to manage edema then decrease frequency          Nelson Wall PTA, STS

## 2018-06-11 ENCOUNTER — CLINICAL SUPPORT (OUTPATIENT)
Dept: REHABILITATION | Facility: HOSPITAL | Age: 53
End: 2018-06-11
Attending: ORTHOPAEDIC SURGERY
Payer: COMMERCIAL

## 2018-06-11 DIAGNOSIS — M25.562 ACUTE PAIN OF LEFT KNEE: ICD-10-CM

## 2018-06-11 DIAGNOSIS — R26.2 DIFFICULTY WALKING: ICD-10-CM

## 2018-06-11 PROCEDURE — 97110 THERAPEUTIC EXERCISES: CPT

## 2018-06-11 NOTE — PROGRESS NOTES
"[facility]       Date: 6/11/18  Start Time: 1315  Stop Time:  1400  Visit #: 25    Patient Name: Alcira Selby  Clinic Number: [id number]  Age: 52 y.o.  Gender: female    Diagnosis:   Encounter Diagnoses   Name Primary?    Acute pain of left knee     Difficulty walking        Referring Physician: Stacey Friedman MD  Treatment Orders: PT Eval and Treat      History     No past medical history on file.    No current outpatient prescriptions on file.     No current facility-administered medications for this visit.        Review of patient's allergies indicates:  Allergies not on file      Subjective     Pt reporting L knee is feeling better and "tape is helping me", and was taped again on initiation of care.   Pain scale 5/10      Objective          AROM 1-120', which is down from previous ROM.    MMT 4+/5 knee flex/ext    Treatment:   -elliptical 10' for increased ROM   -Manual therapy  -patella MOBS/min distraction flexion/ext and kinesiotape L knee for swelling  15'  -Standing B  Green theraband  hip abd 3x10  -Standing B green theraband hip ext 45' 3x10  -Leg press 100# 3x10   -SL press L 80# 3x10   -L knee hammer machine ext 3x10 0#  np  -squats on biodex 5 min with weight shift feedback   -L knee ext stretch on hammer strength machine 20# 5'          Pt educated on Compliance with TKE exercise along with Knee flexion HEP.      Assessment      Pt tolerating tx well. Continues to have good results with kinesio tape for pain relief and edema. Noted decreased pain. No real change in ROM at this time.  Continues to lack approximately 10 degrees of extension and is limited to 120 degrees of flexion at this time.  Today we utilized the Biodex to address improper weight acceptance on the leg and she acknowledged this motor deficits.  At this time, pt would benefit from continued PT to address weight acceptance on the LLE in order to improve gait and strength in her leg.     "     Medical necessity is demonstrated by the following problem list:   - Pain limits function of effected part for all activities  - Unable to participate in daily activities   - Requires skilled supervision to complete and progress HEP  - Fall risk - impaired balance   - Continued inability to participate in vocational pursuits    Short Term Goals (4 Weeks):  - Pt will increase ROM to 0-115 degrees PROM  - Pt will increase strength to perform a SLR without extension lag  - Decrease Pain to 0-6/10 ambulating 150 feet with a straight cane  - Decrease edema by 2CM  - Pt independent with HEP with progressions.     Long Term Goals (8 Weeks):  - Pt will increase ROM to 0-125 degrees AROM  - Pt will increase strength to L knee extnsion to > 4/5  - Decrease Pain to 0-3/10 with community ambulation  - Pt to return to work simulated activity  - Pt to improve FOTO score to >50      Plan     Continue physical therapy as planned, 2x/week x 4 weeks.  Continue with [employer] for weight acceptance feedback with functional mobility.     Pt will be treated by physical therapy 2-3 times a week for 4weeks for manual therapy, therapeutic exercise, home exercise program, patient education, and modalities PRN to achieve established goals. Blaynemelyssa may at times be seen by a PTA as part of the Rehab Team.  Plan to begin TI in order to manage edema then decrease frequency          Nabeel Whitlock PT

## 2018-06-18 ENCOUNTER — CLINICAL SUPPORT (OUTPATIENT)
Dept: REHABILITATION | Facility: HOSPITAL | Age: 53
End: 2018-06-18
Attending: ORTHOPAEDIC SURGERY
Payer: COMMERCIAL

## 2018-06-18 DIAGNOSIS — M25.562 ACUTE PAIN OF LEFT KNEE: Primary | ICD-10-CM

## 2018-06-18 PROCEDURE — 97140 MANUAL THERAPY 1/> REGIONS: CPT

## 2018-06-18 PROCEDURE — 97110 THERAPEUTIC EXERCISES: CPT

## 2018-06-18 NOTE — PROGRESS NOTES
[facility]       Date: 6/18/18  Start Time: 1300  Stop Time:  1400  Visit #: 26    Patient Name: Alcira Selby  Clinic Number: [id number]  Age: 52 y.o.  Gender: female    Diagnosis:   Encounter Diagnoses   Name Primary?    Acute pain of left knee     Difficulty walking        Referring Physician: Stacey Friedman MD  Treatment Orders: PT Eval and Treat      History     No past medical history on file.    No current outpatient prescriptions on file.     No current facility-administered medications for this visit.        Review of patient's allergies indicates:  Allergies not on file      Subjective     Pt reporting kinesio tape didn't stay on long and had min increased swelling and pain over the weekend.    Pain scale 7/10      Objective       Treatment:   -elliptical 5' for increased ROM   -TM 2.2 5' for improved gait pattern   -LAQ 30x   -Manual therapy  -patella MOBS/min distraction flexion/ext and kinesiotape L knee for swelling  10'  -sit-stand squats in chair with foam on sit 3x10  -Standing B  GTB  hip abd 3x10  -Standing B GTB hip ext 45' 3x10  -Leg press 100# 3x10   -SL press L 80# 3x10   -L knee hammer machine ext 3x10 0#  np  -squats on biodex 5 min with weight shift feedback np  -L knee ext stretch on hammer strength machine 20# 5' np         Pt educated on Compliance with TKE exercise along with Knee flexion HEP.      Assessment      Pt tolerating tx well.  Noted decreased pain with kinesio tape.VC/TC for correcting form/technique. Pt would benefit from continued PT to address weight acceptance on the LLE in order to improve gait and strength in her leg.         Medical necessity is demonstrated by the following problem list:   - Pain limits function of effected part for all activities  - Unable to participate in daily activities   - Requires skilled supervision to complete and progress HEP  - Fall risk - impaired balance   - Continued inability to participate  in vocational pursuits    Short Term Goals (4 Weeks):  - Pt will increase ROM to 0-115 degrees PROM  - Pt will increase strength to perform a SLR without extension lag  - Decrease Pain to 0-6/10 ambulating 150 feet with a straight cane  - Decrease edema by 2CM  - Pt independent with HEP with progressions.     Long Term Goals (8 Weeks):  - Pt will increase ROM to 0-125 degrees AROM  - Pt will increase strength to L knee extnsion to > 4/5  - Decrease Pain to 0-3/10 with community ambulation  - Pt to return to work simulated activity  - Pt to improve FOTO score to >50      Plan     Continue physical therapy as planned, 2x/week x 4 weeks.  Continue with [employer] for weight acceptance feedback with functional mobility.     Pt will be treated by physical therapy 2-3 times a week for 4weeks for manual therapy, therapeutic exercise, home exercise program, patient education, and modalities PRN to achieve established goals. Alcira may at times be seen by a PTA as part of the Rehab Team.  Plan to begin TI in order to manage edema then decrease frequency    Nelson Wall PTA, STS

## 2018-06-25 ENCOUNTER — CLINICAL SUPPORT (OUTPATIENT)
Dept: REHABILITATION | Facility: HOSPITAL | Age: 53
End: 2018-06-25
Attending: ORTHOPAEDIC SURGERY
Payer: COMMERCIAL

## 2018-06-25 DIAGNOSIS — R26.2 DIFFICULTY WALKING: ICD-10-CM

## 2018-06-25 DIAGNOSIS — M25.562 ACUTE PAIN OF LEFT KNEE: ICD-10-CM

## 2018-06-25 PROCEDURE — 97110 THERAPEUTIC EXERCISES: CPT

## 2018-06-25 NOTE — PROGRESS NOTES
[facility]       Date: 6/18/18  Start Time: 1300  Stop Time:  1400  Visit #: 26    Patient Name: Alcira Selby  Clinic Number: [id number]  Age: 52 y.o.  Gender: female    Diagnosis:   Encounter Diagnoses   Name Primary?    Acute pain of left knee     Difficulty walking        Referring Physician: Stacey Friedman MD  Treatment Orders: PT Eval and Treat        Subjective     Pt reporting kinesio tape didn't stay on long and had min increased swelling and pain over the weekend.    Pain scale 7/10      Objective       Treatment:   -elliptical 5' for increased ROM   -TM 2.2 5' for improved gait pattern   -LAQ 30x   -Manual therapy  -patella MOBS/min distraction flexion/ext and kinesiotape L knee for swelling  10'  -sit-stand squats in chair with foam on sit 3x10  -Standing B  GTB  hip abd 3x10  -Standing B GTB hip ext 45' 3x10  -Leg press 100# 3x10   -SL press L 80# 3x10   -L knee hammer machine ext 3x10 0#  np  -squats on biodex 5 min with weight shift feedback np  -L knee ext stretch on hammer strength machine 20# 5' np         Pt educated on Compliance with TKE exercise along with Knee flexion HEP.      Assessment      Pt tolerating tx well.  Noted decreased pain with kinesio tape.VC/TC for correcting form/technique. Pt would benefit from continued PT to address weight acceptance on the LLE in order to improve gait and strength in her leg.         Medical necessity is demonstrated by the following problem list:   - Pain limits function of effected part for all activities  - Unable to participate in daily activities   - Requires skilled supervision to complete and progress HEP  - Fall risk - impaired balance   - Continued inability to participate in vocational pursuits    Short Term Goals (4 Weeks):  - Pt will increase ROM to 0-115 degrees PROM  - Pt will increase strength to perform a SLR without extension lag  - Decrease Pain to 0-6/10 ambulating 150 feet with a straight  cane  - Decrease edema by 2CM  - Pt independent with HEP with progressions.     Long Term Goals (8 Weeks):  - Pt will increase ROM to 0-125 degrees AROM  - Pt will increase strength to L knee extnsion to > 4/5  - Decrease Pain to 0-3/10 with community ambulation  - Pt to return to work simulated activity  - Pt to improve FOTO score to >50      Plan     Continue physical therapy as planned, 2x/week x 4 weeks.  Continue with biodex for weight acceptance feedback with functional mobility.     Pt will be treated by physical therapy 2-3 times a week for 4weeks for manual therapy, therapeutic exercise, home exercise program, patient education, and modalities PRN to achieve established goals. Alcira may at times be seen by a PTA as part of the Rehab Team.  Plan to begin TI in order to manage edema then decrease frequency    Nelson Wall PTA, STS

## 2018-07-02 ENCOUNTER — CLINICAL SUPPORT (OUTPATIENT)
Dept: REHABILITATION | Facility: HOSPITAL | Age: 53
End: 2018-07-02
Attending: ORTHOPAEDIC SURGERY
Payer: COMMERCIAL

## 2018-07-02 DIAGNOSIS — R26.2 DIFFICULTY WALKING: ICD-10-CM

## 2018-07-02 DIAGNOSIS — M25.562 ACUTE PAIN OF LEFT KNEE: ICD-10-CM

## 2018-07-02 PROCEDURE — 97110 THERAPEUTIC EXERCISES: CPT

## 2018-07-03 NOTE — PROGRESS NOTES
"[facility]       Date: 6/18/18  Start Time: 1300  Stop Time:  1400  Visit #: 27    Patient Name: Alcira Selby  Clinic Number: [id number]  Age: 52 y.o.  Gender: female    Diagnosis:   Encounter Diagnoses   Name Primary?    Acute pain of left knee     Difficulty walking        Referring Physician: Stacey Friedman MD  Treatment Orders: PT Eval and Treat        Subjective     Pt reporting that her knee is "ok", "tolerable" at this time.    Pain scale 5/10      Objective       Treatment:   -elliptical 5' for increased ROM   -TM 2.2 5' for improved gait pattern   -LAQ 30x   -Manual therapy  -patella MOBS/min distraction flexion/ext and kinesiotape L knee for swelling  10'  Hip 4 way- abduction and flexion 2x10 bilateral 10lbs  -Leg press 100# 3x10   -SL press L 80# 3x10   -L knee hammer machine ext 3x10 0#  np  -squats on biodex 5 min with weight shift feedback np  -L knee ext stretch on hammer strength machine 20# 5' np         Pt educated on Compliance with TKE exercise along with Knee flexion HEP.      Assessment      Pt tolerating tx well.  ROM  degrees.  No real changes at this time.  Pt does tolerate treatment well.        Medical necessity is demonstrated by the following problem list:   - Pain limits function of effected part for all activities  - Unable to participate in daily activities   - Requires skilled supervision to complete and progress HEP  - Fall risk - impaired balance   - Continued inability to participate in vocational pursuits    Short Term Goals (4 Weeks):  - Pt will increase ROM to 0-115 degrees PROM  - Pt will increase strength to perform a SLR without extension lag  - Decrease Pain to 0-6/10 ambulating 150 feet with a straight cane  - Decrease edema by 2CM  - Pt independent with HEP with progressions.     Long Term Goals (8 Weeks):  - Pt will increase ROM to 0-125 degrees AROM  - Pt will increase strength to L knee extnsion to > 4/5  - " Decrease Pain to 0-3/10 with community ambulation  - Pt to return to work simulated activity  - Pt to improve FOTO score to >50      Plan     Continue physical therapy as planned, 2x/week x 4 weeks.  Continue with biodex for weight acceptance feedback with functional mobility.     Pt will be treated by physical therapy 2-3 times a week for 4weeks for manual therapy, therapeutic exercise, home exercise program, patient education, and modalities PRN to achieve established goals. Alcira may at times be seen by a PTA as part of the Rehab Team.  Plan to begin TI in order to manage edema then decrease frequency    Nabeel Kamlesh PT

## 2018-07-09 ENCOUNTER — CLINICAL SUPPORT (OUTPATIENT)
Dept: REHABILITATION | Facility: HOSPITAL | Age: 53
End: 2018-07-09
Attending: ORTHOPAEDIC SURGERY
Payer: COMMERCIAL

## 2018-07-09 DIAGNOSIS — R26.2 DIFFICULTY WALKING: ICD-10-CM

## 2018-07-09 DIAGNOSIS — M25.562 ACUTE PAIN OF LEFT KNEE: ICD-10-CM

## 2018-07-09 PROCEDURE — 97140 MANUAL THERAPY 1/> REGIONS: CPT

## 2018-07-09 PROCEDURE — 97110 THERAPEUTIC EXERCISES: CPT

## 2018-07-09 NOTE — PROGRESS NOTES
"[facility]       Date: 6/18/18  Start Time: 1:05 pm  Stop Time:  2:05 pm  Visit #: 28    Patient Name: Alcira Selby  Clinic Number: [id number]  Age: 52 y.o.  Gender: female    Diagnosis:   Encounter Diagnoses   Name Primary?    Acute pain of left knee     Difficulty walking        Referring Physician: Stacey Friedman MD  Treatment Orders: PT Eval and Treat        Subjective     Pt reporting that her knee is "ok" and not hurting any more than usual.    Pain scale 5/10      Objective       Treatment:   Theraputic exercise: for 45 minutes to improve L LE ROM and strength  -Kinesiotape applied to L knee to reduce edema  -Recumbent bike 5' for increased ROM   -Quad sets w/bolster under ankle 2x10  -Heel slides 2x10 L  -LAQ 3x10  -Hip 4 way- abduction and extension 2x10 bilateral 10lbs  -Leg press 100# 3x10   -SL press L 80# 3x10     -L knee hammer machine ext 3x10 0#  np  -Squats on biodex 5 min with weight shift feedback np  -L knee ext stretch on hammer strength machine 20# 5' np    Manual therapy: for 15 minutes to improve L knee extension  -Post femur glides with ankle over bolster grades II-III     Pt educated on Compliance with HEP.      Assessment     Pt tolerated treatment session well with slight discomfort with heel slides following manual therapy but was able to complete sets without complaints. Pt tolerated addition of hip extension on 4-way hip machine without complaints, requiring intermittent VC to improve technique for isolated gluteus activation. Pt will continue to benefit LE strengthening and ROM exercises to decrease pain and improve functional mobility.       Medical necessity is demonstrated by the following problem list:   - Pain limits function of effected part for all activities  - Unable to participate in daily activities   - Requires skilled supervision to complete and progress HEP  - Fall risk - impaired balance   - Continued inability to " participate in vocational pursuits    Short Term Goals (4 Weeks):  - Pt will increase ROM to 0-115 degrees PROM  - Pt will increase strength to perform a SLR without extension lag  - Decrease Pain to 0-6/10 ambulating 150 feet with a straight cane  - Decrease edema by 2CM  - Pt independent with HEP with progressions.     Long Term Goals (8 Weeks):  - Pt will increase ROM to 0-125 degrees AROM  - Pt will increase strength to L knee extnsion to > 4/5  - Decrease Pain to 0-3/10 with community ambulation  - Pt to return to work simulated activity  - Pt to improve FOTO score to >50      Plan     Continue physical therapy as planned, 2x/week x 4 weeks.  Continue with [employer] for weight acceptance feedback with functional mobility.     Pt will be treated by physical therapy 2-3 times a week for 4weeks for manual therapy, therapeutic exercise, home exercise program, patient education, and modalities PRN to achieve established goals. Alcira may at times be seen by a PTA as part of the Rehab Team.  Plan to begin TI in order to manage edema then decrease frequency    Nabeel Whitlock PT   Yolanda Glez, SPT

## 2018-07-16 ENCOUNTER — CLINICAL SUPPORT (OUTPATIENT)
Dept: REHABILITATION | Facility: HOSPITAL | Age: 53
End: 2018-07-16
Attending: ORTHOPAEDIC SURGERY
Payer: COMMERCIAL

## 2018-07-16 DIAGNOSIS — R26.2 DIFFICULTY WALKING: ICD-10-CM

## 2018-07-16 DIAGNOSIS — M25.562 ACUTE PAIN OF LEFT KNEE: ICD-10-CM

## 2018-07-16 PROCEDURE — G8978 MOBILITY CURRENT STATUS: HCPCS | Mod: CL

## 2018-07-16 PROCEDURE — 97110 THERAPEUTIC EXERCISES: CPT

## 2018-07-16 PROCEDURE — G8979 MOBILITY GOAL STATUS: HCPCS | Mod: CK

## 2018-07-16 NOTE — PROGRESS NOTES
"[facility]       Date: 6/18/18  Start Time: 1:00 pm  Stop Time:  2:00 pm  Visit #: 29    Patient Name: Alcira Selby  Clinic Number: [id number]  Age: 52 y.o.  Gender: female    Diagnosis:   Encounter Diagnoses   Name Primary?    Acute pain of left knee     Difficulty walking        Referring Physician: Stacey Friedman MD  Treatment Orders: PT Eval and Treat        Subjective     Pt reporting that her knee is "ok" and not hurting any more than usual.    Pain scale 4-5/10      Objective       Treatment:   Theraputic exercise: for 45 minutes to improve L LE ROM and strength  -Kinesiotape applied to L knee to reduce edema  -Recumbent bike 5' for increased ROM   -LAQ 3x10 5 lbs  -Hip 4 way- abduction and extension 2x10 bilateral 10lbs  -Leg press 100# 3x10   -SL press L 80# 3x10   -step ups/downs on stair case x 3 min without UE assist           Pt educated on Compliance with HEP.      Assessment     Pt tolerated treatment session well, pt did take another FOTO questionnaire on today's visit.  Pt is comfortable with HEP program and would benefit from the [facility] program.  She will be referred for continue independent exercise.  Discharge at the end of the month.         Medical necessity is demonstrated by the following problem list:   - Pain limits function of effected part for all activities  - Unable to participate in daily activities   - Requires skilled supervision to complete and progress HEP  - Fall risk - impaired balance   - Continued inability to participate in vocational pursuits    Short Term Goals (4 Weeks):  - Pt will increase ROM to 0-115 degrees PROM  - Pt will increase strength to perform a SLR without extension lag  - Decrease Pain to 0-6/10 ambulating 150 feet with a straight cane  - Decrease edema by 2CM  - Pt independent with HEP with progressions.     Long Term Goals (8 Weeks):  - Pt will increase ROM to 0-125 degrees AROM  - Pt will increase " strength to L knee extnsion to > 4/5  - Decrease Pain to 0-3/10 with community ambulation  - Pt to return to work simulated activity  - Pt to improve FOTO score to >50      Plan     Continue physical therapy as planned, DC on 7/30/18 as planned.   Pt will be treated by physical therapy 2-3 times a week for 4weeks for manual therapy, therapeutic exercise, home exercise program, patient education, and modalities PRN to achieve established goals. Blayneurielsysaeid may at times be seen by a PTA as part of the Rehab Team.  Plan to begin TI in order to manage edema then decrease frequency    Nabeel Whitlock PT   Yolanda Glez, SPT

## 2018-07-23 ENCOUNTER — CLINICAL SUPPORT (OUTPATIENT)
Dept: REHABILITATION | Facility: HOSPITAL | Age: 53
End: 2018-07-23
Attending: ORTHOPAEDIC SURGERY
Payer: COMMERCIAL

## 2018-07-23 DIAGNOSIS — R26.2 DIFFICULTY WALKING: ICD-10-CM

## 2018-07-23 DIAGNOSIS — M25.562 ACUTE PAIN OF LEFT KNEE: ICD-10-CM

## 2018-07-23 PROCEDURE — 97110 THERAPEUTIC EXERCISES: CPT

## 2018-07-23 NOTE — PROGRESS NOTES
"[facility]       Date: 6/18/18  Start Time: 1:00 pm  Stop Time:  2:00 pm  Visit #: 29    Patient Name: Alcira Selby  Clinic Number: [id number]  Age: 52 y.o.  Gender: female    Diagnosis:   Encounter Diagnoses   Name Primary?    Acute pain of left knee     Difficulty walking        Referring Physician: Stacey Friedman MD  Treatment Orders: PT Eval and Treat        Subjective     Pt reporting that her knee is "ok" and not hurting any more than usual.    Pain scale 4-5/10, no change in the last few weeks.      Objective   ROM 0-125    Treatment:   Theraputic exercise: for 45 minutes to improve L LE ROM and strength    -Bike 10 min resist 3.0  -LAQ 3x10 5 lbs  -Hip 4 way- abduction and extension 2x10 bilateral 10lbs  -Leg press 100# 3x10   -SL press L 80# 3x10   -step ups/downs on stair case x 3 min without UE assist   -squats behind table 30x   -Heel raises 30x   -SL balance 10 sec holds x 10 bilateral  ROM in prone with flexion bias.           Pt educated on Compliance with HEP.      Assessment     Pt tolerated treatment session well, she is aware that her next visit will be her last.  Her functional status has not changed in the last 4 weeks and she does have a gym membership where she will go 2-3x/week.  At this time, we will continue with plan to DC on next visit.     Medical necessity is demonstrated by the following problem list:   - Pain limits function of effected part for all activities  - Unable to participate in daily activities   - Requires skilled supervision to complete and progress HEP  - Fall risk - impaired balance   - Continued inability to participate in vocational pursuits    Short Term Goals (4 Weeks):  - Pt will increase ROM to 0-115 degrees PROM  - Pt will increase strength to perform a SLR without extension lag  - Decrease Pain to 0-6/10 ambulating 150 feet with a straight cane  - Decrease edema by 2CM  - Pt independent with HEP with progressions. "     Long Term Goals (8 Weeks):  - Pt will increase ROM to 0-125 degrees AROM  - Pt will increase strength to L knee extnsion to > 4/5  - Decrease Pain to 0-3/10 with community ambulation  - Pt to return to work simulated activity  - Pt to improve FOTO score to >50      Plan     Continue physical therapy as planned, DC on 7/30/18 as planned.   Pt will be treated by physical therapy 2-3 times a week for 4weeks for manual therapy, therapeutic exercise, home exercise program, patient education, and modalities PRN to achieve established goals. Alcira may at times be seen by a PTA as part of the Rehab Team.  Plan to begin TI in order to manage edema then decrease frequency    Nabeel Whitlock PT   Yolanda Glez, SPT

## 2018-07-30 ENCOUNTER — CLINICAL SUPPORT (OUTPATIENT)
Dept: REHABILITATION | Facility: HOSPITAL | Age: 53
End: 2018-07-30
Attending: ORTHOPAEDIC SURGERY
Payer: COMMERCIAL

## 2018-07-30 DIAGNOSIS — R26.2 DIFFICULTY WALKING: ICD-10-CM

## 2018-07-30 DIAGNOSIS — M25.562 ACUTE PAIN OF LEFT KNEE: ICD-10-CM

## 2018-07-30 PROCEDURE — 97110 THERAPEUTIC EXERCISES: CPT

## 2018-07-30 NOTE — PROGRESS NOTES
"Outpatient Therapy Discharge Summary     Name: Alcira Selby  [facility] Number: [id number]    Therapy Diagnosis:   Encounter Diagnoses   Name Primary?    Acute pain of left knee     Difficulty walking      Physician: Stacey Friedman MD    Date: 7/30/18  Start Time: 1:00 pm  Stop Time:  2:00 pm  Visit #: 29     Patient Name: Alcira Selby  [facility] Number: [id number]  Age: 52 y.o.  Gender: female     Diagnosis:        Encounter Diagnoses   Name Primary?    Acute pain of left knee      Difficulty walking           Referring Physician: Stacey Friedman MD  Treatment Orders: PT Eval and Treat           Subjective      Pt reporting that her knee is "ok" and not hurting any more than usual.    Pain scale 4-5/10, no change in the last few weeks.        Objective   ROM 0-125     Treatment:   Theraputic exercise: for 45 minutes to improve L LE ROM and strength     -Bike 10 min resist 3.0  -LAQ 3x10 5 lbs  -Hip 4 way- abduction and extension 2x10 bilateral 10lbs  -Leg press 100# 3x10   -SL press L 80# 3x10   -step ups/downs on stair case x 3 min without UE assist   -squats behind table 30x   -Heel raises 30x   -SL balance 10 sec holds x 10 bilateral  ROM in prone with flexion bias.   Stair walking 3 min             Pt educated on Compliance with HEP.      Assessment      Pt tolerated treatment session well, she does have a membership at [employer] and will be going there for bike and elliptical exercise.  Pt also advised to perform light general machine LE exercises to toleration.  She performed stairs today, able to go up and down a flight of stairs without holding onto the arm rails.  Pt has met most of her goals including her FOTO.  DC from PT to HEP     CMS Impairment/Limitation/Restriction for FOTO Knee Survey  Status Limitation G-Code CMS Severity Modifier  Intake 10% 90%  Predicted 47% 53% Goal Status+ CK - At least 40 percent but less than 60 percent  3/22/2018 30% 70%  5/16/2018 33% 67%  7/16/2018 28% " 72%  7/30/2018 65% 35% Current Status CJ - At least 20 percent but less than 40 percent  D/C Status CJ **only report if this is discharge survey      Medical necessity is demonstrated by the following problem list:   - Pain limits function of effected part for all activities  - Unable to participate in daily activities   - Requires skilled supervision to complete and progress HEP  - Fall risk - impaired balance   - Continued inability to participate in vocational pursuits     Short Term Goals (4 Weeks):  - Pt will increase ROM to 0-115 degrees PROM  - Pt will increase strength to perform a SLR without extension lag  - Decrease Pain to 0-6/10 ambulating 150 feet with a straight cane  - Decrease edema by 2CM  - Pt independent with HEP with progressions.      Long Term Goals (8 Weeks):  - Pt will increase ROM to 0-125 degrees AROM  - Pt will increase strength to L knee extnsion to > 4/5  - Decrease Pain to 0-3/10 with community ambulation  - Pt to return to work simulated activity  - Pt to improve FOTO score to >50              Plan    DC to HEP as planned      Pt will be treated by physical therapy 2-3 times a week for 4weeks for manual therapy, therapeutic exercise, home exercise program, patient education, and modalities PRN to achieve established goals. Alcira may at times be seen by a PTA as part of the Rehab Team.  Plan to begin TI in order to manage edema then decrease frequency     Nabeel Whitlock PT

## 2018-11-21 ENCOUNTER — OFFICE VISIT (OUTPATIENT)
Dept: UROLOGY | Facility: CLINIC | Age: 53
End: 2018-11-21
Payer: COMMERCIAL

## 2018-11-21 VITALS
BODY MASS INDEX: 35.99 KG/M2 | DIASTOLIC BLOOD PRESSURE: 93 MMHG | WEIGHT: 237.44 LBS | SYSTOLIC BLOOD PRESSURE: 144 MMHG | HEIGHT: 68 IN | HEART RATE: 80 BPM

## 2018-11-21 DIAGNOSIS — R33.9 INCOMPLETE EMPTYING OF BLADDER: ICD-10-CM

## 2018-11-21 DIAGNOSIS — N39.41 URGE INCONTINENCE: Primary | ICD-10-CM

## 2018-11-21 PROCEDURE — 99999 PR PBB SHADOW E&M-EST. PATIENT-LVL IV: CPT | Mod: PBBFAC,,, | Performed by: UROLOGY

## 2018-11-21 PROCEDURE — 81002 URINALYSIS NONAUTO W/O SCOPE: CPT | Mod: S$GLB,,, | Performed by: UROLOGY

## 2018-11-21 PROCEDURE — 99205 OFFICE O/P NEW HI 60 MIN: CPT | Mod: 25,S$GLB,, | Performed by: UROLOGY

## 2018-11-21 PROCEDURE — 51701 INSERT BLADDER CATHETER: CPT | Mod: S$GLB,,, | Performed by: UROLOGY

## 2018-11-21 PROCEDURE — 87086 URINE CULTURE/COLONY COUNT: CPT

## 2018-11-21 RX ORDER — CIPROFLOXACIN 250 MG/1
500 TABLET, FILM COATED ORAL ONCE
Status: CANCELLED | OUTPATIENT
Start: 2018-11-21 | End: 2018-11-21

## 2018-11-21 RX ORDER — BUTALBITAL, ACETAMINOPHEN AND CAFFEINE 50; 325; 40 MG/1; MG/1; MG/1
CAPSULE ORAL DAILY
COMMUNITY
End: 2019-08-21 | Stop reason: SDUPTHER

## 2018-11-21 RX ORDER — OXYBUTYNIN CHLORIDE 5 MG/1
5 TABLET ORAL 2 TIMES DAILY
COMMUNITY
Start: 2018-11-20 | End: 2018-12-04

## 2018-11-21 RX ORDER — LIDOCAINE HYDROCHLORIDE 20 MG/ML
JELLY TOPICAL ONCE
Status: CANCELLED | OUTPATIENT
Start: 2018-11-21 | End: 2018-11-21

## 2018-11-21 NOTE — PATIENT INSTRUCTIONS
[facility] Ultra 25 Billion CFU Probiotic Complex, Multi Strain.  The Multi Strain is specifically the one that is important as the greater variety of strains is better.  Make sure the product is not .        Urodynamics Studies     The bladder holds urine until it leaves the body through the urethra.     Urodynamics studies are a series of tests that give your doctor a close look at the working of your bladder and urethra. The tests can help your doctor learn about any problems storing urine or voiding (eliminating) urine from your body.  Understanding the lower urinary tract  The lower part of the urinary tract has several parts.  · The bladder stores urine until youre ready to release it.  · The urethra is the tube that carries urine from the bladder out of the body.  · The sphincter is made up of muscles around the opening of the bladder. The sphincter muscles tighten to hold urine in the bladder. They relax to let urine flow. Signals from the brain tell the sphincter when to tighten and relax. These signals also tell the bladder when to contract to let urine flow out of the body.  Why you need a urodynamics study  This test may be ordered if you:  · Are incontinent (leak urine)  · Have a bladder that does not empty all the way.  · Have symptoms such as the need to urinate often or a constant strong need to urinate  · Have intermittent or weak urine stream  · Have persistent urinary tract infections  Preparing for the study  · Tell your doctor about any medicine youre taking. Ask if you should stop them before the study.  · Keep a diary of your bathroom habits. Do this for a few days before the study. This diary can be a helpful part of the evaluation.  · Ask if you need to arrive for the study with a full bladder.  Date Last Reviewed: 2017  © 0193-0914 [employer]. [address] [place] [address], [place] [address]. All rights reserved. This information is not intended as a  substitute for professional medical care. Always follow your healthcare professional's instructions.          Urodynamic Studies     The equipment used for the study varies depending upon the facility and what tests are done.     Urodynamic studies may be done in your doctors office, a clinic, or a hospital. The studies may take up to an hour or more. This depends on which tests your doctor does. The tests are generally painless. You wont need sedating medicine.  Tests that may be done  Uroflowmetry. This measures the amount and speed of urine you void from your bladder. You urinate into a funnel. Its attached to a computer that records your urine flow over time. The amount of urine left in your bladder after you void may also be measured right after this test.  Cystometry. This test evaluates how much your bladder can hold. It also measures how strong your bladder muscle is and how well the signals work that tell you when your bladder is full. Your healthcare provider fills your bladder with sterile water or saline solution, through a catheter. Your doctor will instruct you to report any sensations you feel. Mention if theyre similar to symptoms youve felt at home. Your doctor may ask you to cough, stand and walk, or bear down during this test.  Electromyogram. This helps evaluate the muscle contractions that control urination, such as sphincter muscle contractions. Your healthcare provider may place electrode patches or wires near your rectum or urethra to make the recording. He or she may ask you to try to tighten or relax your sphincter muscles during this test.  Pressure flow study. This test measures your detrusor, urethral, and abdominal pressures. Detrusor is the muscle surrounding the bladder walls that relaxes to allow your bladder to fill, and and contracts to squeeze out urine. A pressure flow study is often done after cystometry. Youre asked to urinate while a probe in your urethra measures  pressures.  Video cystourethrography. This takes video pictures of urine flow through your urinary tract. It can help identify blockages or other problems. The bladder is filled with an X-ray contrast fluid. Then X-ray video pictures are taken as the fluid is urinated out. Ultrasound imaging may also be combined with routine urodynamic studies.  Ambulatory urodynamics. This test can be used to evaluate you while doing usual activities.  Getting your results  After the study, youll get dressed and return to the consultation room. Test results may be ready soon after the study is finished. Or, you may return to your doctors office in a few days for your results. Your doctor can talk with you about the study report and your options.   Date Last Reviewed: 1/1/2017 © 2000-2017 [employer]. [address] [place] [address]. All rights reserved. This information is not intended as a substitute for professional medical care. Always follow your healthcare professional's instructions.          Cystoscopy    Cystoscopy is a procedure that lets your doctor look directly inside your urethra and bladder. It can be used to:  · Help diagnose a problem with your urethra, bladder, or kidneys.  · Take a sample (biopsy) of bladder or urethral tissue.  · Treat certain problems (such as removing kidney stones).  · Place a stent to bypass an obstruction.  · Take special X-rays of the kidneys.  Based on the findings, your doctor may recommend other tests or treatments.  What is a cystoscope?  A cystoscope is a telescope-like instrument that contains lenses and fiberoptics (small glass wires that make bright light). The cystoscope may be straight and rigid, or flexible to bend around curves in the urethra. The doctor may look directly into the cystoscope, or project the image onto a monitor.  Getting ready  · Ask your doctor if you should stop taking any medicines before the procedure.  · Ask whether you should  avoid eating or drinking anything after midnight before the procedure.  · Follow any other instructions your doctor gives you.  Tell your doctor before the exam if you:  · Take any medicines, such as aspirin or blood thinners  · Have allergies to any medicines  · Are pregnant   The procedure  Cystoscopy is done in the doctors office, surgery center, or hospital. The doctor and a nurse are present during the procedure. It takes only a few minutes, longer if a biopsy, X-ray, or treatment needs to be done.  During the procedure:  · You lie on an exam table on your back, knees bent and legs apart. You are covered with a drape.  · Your urethra and the area around it are washed. Anesthetic jelly may be applied to numb the urethra. Other pain medicine is usually not needed. In some cases, you may be offered a mild sedative to help you relax. If a more extensive procedure is to be done, such as a biopsy or kidney stone removal, general anesthesia may be needed.  · The cystoscope is inserted. A sterile fluid is put into the bladder to expand it. You may feel pressure from this fluid.  · When the procedure is done, the cystoscope is removed.  After the procedure  If you had a sedative, general anesthesia, or spinal anesthesia, you must have someone drive you home. Once youre home:  · Drink plenty of fluids.  · You may have burning or light bleeding when you urinate--this is normal.  · Medicines may be prescribed to ease any discomfort or prevent infection. Take these as directed.  · Call your doctor if you have heavy bleeding or blood clots, burning that lasts more than a day, a fever over 100°F  (38° C), or trouble urinating.  Date Last Reviewed: 1/1/2017  © 6031-1063 [employer]. [address] [place] [address], [place] [address]. All rights reserved. This information is not intended as a substitute for professional medical care. Always follow your healthcare professional's instructions.

## 2018-11-21 NOTE — PROGRESS NOTES
CHIEF COMPLAINT:    Mrs. Selby is a 53 y.o. female presenting as a self referred patient for urgency, urge incontinence.    PRESENTING ILLNESS:    Alcira Selby is a 53 y.o. female with urgency and urge incontinence especially with the first am void.  She states she has large volume losses.  She has been tried on Vesicare and was switched to oxybutynin 5 mg bid, changed due to insurance issues.  She had better control on the Vesicare but was still incontinent.  She wears a panty liner and changes it 3-4 times a day.  Once at night.  Frequency x 8-10, nocturia x 1.  Denies any gross hematuria or recurrent UTI.    She has a history of breast cancer, status post right mastectomy.  Also had 2 knee replacements, same knee, was redone because of persistent pain.     , hysterectomy, later oophorectomy, sexually active, bowels, typically once a week, increased her dietary fiber and now 2 times a week.      REVIEW OF SYSTEMS:    Review of Systems   Constitutional: Negative.    HENT: Negative.    Eyes: Negative.    Respiratory: Negative.    Cardiovascular: Negative.    Gastrointestinal: Negative.    Genitourinary: Positive for frequency and urgency.   Musculoskeletal: Positive for joint pain.   Skin: Negative.    Neurological: Negative.    Endo/Heme/Allergies: Negative.    Psychiatric/Behavioral: Negative.        PATIENT HISTORY:    Past Medical History:   Diagnosis Date    Breast cancer            Past Surgical History:   Procedure Laterality Date    FOOT SURGERY Bilateral     Plantar Fascities    HYSTERECTOMY      KNEE SURGERY Left     Total Knee Replacement    MYOMECTOMY      TONSILLECTOMY         Family History   Problem Relation Age of Onset    Hypertension Father     Hypertension Mother     Cancer Maternal Aunt         breast cancer       Socioeconomic History    Marital status:    Tobacco Use    Smoking status: Never Smoker    Smokeless tobacco: Never Used   Substance and Sexual Activity     Alcohol use: No     Frequency: Never    Drug use: No    Sexual activity: Not on file       Allergies:  Sulfamethoxazole-trimethoprim    Medications:  Outpatient Encounter Medications as of 11/21/2018   Medication Sig Dispense Refill    butalbital-acetaminophen-caff -40 mg -40 mg Cap Take by mouth once daily.      oxybutynin (DITROPAN) 5 MG Tab Take 5 mg by mouth 2 (two) times daily.       No facility-administered encounter medications on file as of 11/21/2018.          PHYSICAL EXAMINATION:    The patient generally appears in good health, is appropriately interactive, and is in no apparent distress.    Skin: No lesions.    Mental: Cooperative with normal affect.    Neuro: Grossly intact.    HEENT: Normal. No evidence of lymphadenopathy.    Chest:  normal inspiratory effort.    Abdomen:  Soft, non-tender. No masses or organomegaly. Bladder is not palpable. No evidence of flank discomfort. No evidence of inguinal hernia.    Extremities: No clubbing, cyanosis, or edema    Normal external female genitalia  Grade II urogenital atrophy  Urethral meatus is normal  Urethra and bladder are nontender to bimanual exam  Well supported anteriorly and posteriorly   Uterus and cervix are normal  No adnexal masses  PVR by catheterization was 100 ml    LABS:    Lab Results   Component Value Date    BUN 14 10/18/2006    CREATININE 1.0 10/18/2006     UA 1.015, pH 7, tr blood, otherwise, negative      IMPRESSION:    Encounter Diagnoses   Name Primary?    Urge incontinence Yes    Incomplete emptying of bladder        PLAN:    1.  The catheterized specimen was sent for culture  2.  SUDS/Cysto.  She is interested in Botox, however, have a concern since her PVR was 100 ml  3.  Bladder Matters Book

## 2018-11-23 LAB — BACTERIA UR CULT: NO GROWTH

## 2018-12-04 ENCOUNTER — OFFICE VISIT (OUTPATIENT)
Dept: UROLOGY | Facility: CLINIC | Age: 53
End: 2018-12-04
Payer: COMMERCIAL

## 2018-12-04 VITALS
WEIGHT: 237 LBS | BODY MASS INDEX: 35.92 KG/M2 | HEIGHT: 68 IN | HEART RATE: 82 BPM | TEMPERATURE: 99 F | DIASTOLIC BLOOD PRESSURE: 98 MMHG | SYSTOLIC BLOOD PRESSURE: 135 MMHG

## 2018-12-04 DIAGNOSIS — R32 URINARY INCONTINENCE IN FEMALE: Primary | ICD-10-CM

## 2018-12-04 LAB — POC RESIDUAL URINE VOLUME: 6 ML (ref 0–100)

## 2018-12-04 PROCEDURE — 99999 PR PBB SHADOW E&M-EST. PATIENT-LVL III: CPT | Mod: PBBFAC,,, | Performed by: STUDENT IN AN ORGANIZED HEALTH CARE EDUCATION/TRAINING PROGRAM

## 2018-12-04 PROCEDURE — 99214 OFFICE O/P EST MOD 30 MIN: CPT | Mod: 25,S$GLB,, | Performed by: STUDENT IN AN ORGANIZED HEALTH CARE EDUCATION/TRAINING PROGRAM

## 2018-12-04 PROCEDURE — 51798 US URINE CAPACITY MEASURE: CPT | Mod: S$GLB,,, | Performed by: STUDENT IN AN ORGANIZED HEALTH CARE EDUCATION/TRAINING PROGRAM

## 2018-12-04 RX ORDER — OXYBUTYNIN CHLORIDE 10 MG/1
10 TABLET, EXTENDED RELEASE ORAL DAILY
Qty: 30 TABLET | Refills: 2 | Status: SHIPPED | OUTPATIENT
Start: 2018-12-04 | End: 2019-01-03 | Stop reason: SDUPTHER

## 2018-12-04 RX ORDER — FLUTICASONE PROPIONATE 50 MCG
1 SPRAY, SUSPENSION (ML) NASAL DAILY
COMMUNITY
End: 2019-09-06 | Stop reason: SDUPTHER

## 2018-12-04 NOTE — PATIENT INSTRUCTIONS
Try to optimize your bowel movements. Goal of 1 soft daily bowel movement.   Can increase fiber - oatmeal, fruits, vegetable. Over the counter stool softeners that you can use - milk of magnesia, miralax.     You can also use probiotics - Culturelle    Limit hydration 3 hours before bedtime.

## 2018-12-04 NOTE — PROGRESS NOTES
"Subjective:       Patient ID: Alcira Selby is a 53 y.o. female.    Chief Complaint: Urinary Incontinence  This is a 53 y.o.  female patient that is new to me but not new to the system.  The patient is self referred to me for urinary urgency and urgency incontinence. She last saw Dr. Linton 2 weeks ago. She notes her urgency and urgency incontinence is most notable with the first void of the morning.  She states she has large volume incontinence episodes.    From Dr. Linton's notes-  "She has been tried on Vesicare and was switched to oxybutynin 5 mg bid, changed due to insurance issues.  She had better control on the Vesicare but was still incontinent.  She wears a panty liner and changes it 3-4 times a day.  Once at night.  Frequency x 8-10, nocturia x 1.  Denies any gross hematuria or recurrent UTI.  She has a history of breast cancer, status post right mastectomy.  Also had 2 knee replacements, same knee, was redone because of persistent pain.   , hysterectomy, later oophorectomy, sexually active, bowels, typically once a week, increased her dietary fiber and now 2 times a week.  Bladder is not palpable. No evidence of flank discomfort. No evidence of inguinal hernia. "  Dr. Linton's exam 2 weeks ago-   Normal external female genitalia  Grade II urogenital atrophy  Urethral meatus is normal  Urethra and bladder are nontender to bimanual exam  Well supported anteriorly and posteriorly   Uterus and cervix are normal  No adnexal masses  PVR by catheterization was 100 ml    Dr. Linton catheterized the patient and her residual was noted to be 100cc. The urine was sent for culture which was negative for a UTI. Dr. Linton recommended SUDS and cystoscopy. They discussed Botox at that visit.      18  She returns today to see me in the clinic for the first time. She states her friend, Jeni Junior who is a nurse for [employer], saw me in clinic and recommended me to her. She last voided around 1.5 " hours ago and her bladder scan when checked in clinic today was 6cc. She has been on oxybutynin for about 1-2 years. She takes 5mg short acting twice a day. Her symptoms that she complains about is accurately noted in Dr. Linton's note. She notes that when she wakes up in the morning her bladder is so full that she leaks on herself.   Bowel movements - she has a bowel movement 1-2x/weekly.  She has a longstanding history of constipation.   DTF - 8-10x daily or more  NTF - 2x/night.     PVR 6cc (last voided about 1.5 hours ago)    Lab Results   Component Value Date    CREATININE 1.0 10/18/2006      ---  Past Medical History:   Diagnosis Date    Breast cancer     2004       Past Surgical History:   Procedure Laterality Date    FOOT SURGERY Bilateral     Plantar Fascities    HYSTERECTOMY      KNEE SURGERY Left     Total Knee Replacement    MYOMECTOMY      TONSILLECTOMY         Family History   Problem Relation Age of Onset    Hypertension Father     Hypertension Mother     Cancer Maternal Aunt         breast cancer       Social History     Tobacco Use    Smoking status: Never Smoker    Smokeless tobacco: Never Used   Substance Use Topics    Alcohol use: No     Frequency: Never    Drug use: No       Current Outpatient Medications on File Prior to Visit   Medication Sig Dispense Refill    butalbital-acetaminophen-caff -40 mg -40 mg Cap Take by mouth once daily.      fluticasone (FLONASE) 50 mcg/actuation nasal spray 1 spray by Each Nare route once daily.      [DISCONTINUED] oxybutynin (DITROPAN) 5 MG Tab Take 5 mg by mouth 2 (two) times daily.       No current facility-administered medications on file prior to visit.        Review of patient's allergies indicates:   Allergen Reactions    Sulfamethoxazole-trimethoprim      Hives and Itching       Review of Systems   Constitutional: Negative for activity change.   HENT: Negative for congestion.    Eyes: Negative for visual disturbance.    Respiratory: Negative for shortness of breath.    Cardiovascular: Negative for chest pain.   Gastrointestinal: Negative for abdominal distention.   Musculoskeletal: Negative for gait problem.   Skin: Negative for color change.   Neurological: Negative for dizziness.   Psychiatric/Behavioral: Negative for agitation.       Objective:      Physical Exam   Constitutional: She is oriented to person, place, and time. She appears well-developed.   HENT:   Head: Normocephalic and atraumatic.   Eyes: EOM are normal.   Neck: Normal range of motion.   Cardiovascular: Intact distal pulses.   Pulmonary/Chest: Effort normal.   Abdominal: Soft. She exhibits no distension. There is no tenderness.   Musculoskeletal: Normal range of motion.   Neurological: She is alert and oriented to person, place, and time.   Skin: Skin is warm and dry.   Psychiatric: She has a normal mood and affect.       Assessment:       1. Urinary incontinence in female        Plan:         Bladder scan favorable today - 6cc.     1. Limit hydration 3 hours before bedtime.  2. Timed frequent voiding, double voiding.   3. Can try to increase oxybutynin to long acting form. Oxybutynin 10mg long acting, may decrease constipation symptoms.   4. Bladder irritants list provided to patient. Avoid bladder irritants including but not limited to caffeine, alcohol, smoking, spicy foods, acidic foods, tomato-based products, citrus, artificial sweeteners, chocolate, coffee or tea.  5. Constipation - see instructions printed out for patient. Goal is 1 soft daily BM. Probiotics such as culturelle were recommended to her.   6. Counseled patient to keep procedure(s) appointments with Dr. Linton. She informed me she will do so.       Urinary incontinence in female  -     POCT Bladder Scan    Other orders  -     oxybutynin (DITROPAN-XL) 10 MG 24 hr tablet; Take 1 tablet (10 mg total) by mouth once daily.  Dispense: 30 tablet; Refill: 2

## 2018-12-07 ENCOUNTER — PROCEDURE VISIT (OUTPATIENT)
Dept: UROLOGY | Facility: CLINIC | Age: 53
End: 2018-12-07
Payer: COMMERCIAL

## 2018-12-07 VITALS
SYSTOLIC BLOOD PRESSURE: 162 MMHG | TEMPERATURE: 99 F | WEIGHT: 232 LBS | HEIGHT: 68 IN | HEART RATE: 77 BPM | BODY MASS INDEX: 35.16 KG/M2 | DIASTOLIC BLOOD PRESSURE: 97 MMHG

## 2018-12-07 DIAGNOSIS — N39.41 URGE INCONTINENCE: ICD-10-CM

## 2018-12-07 RX ORDER — LIDOCAINE HYDROCHLORIDE 20 MG/ML
JELLY TOPICAL ONCE
Status: COMPLETED | OUTPATIENT
Start: 2018-12-07 | End: 2018-12-07

## 2018-12-07 RX ORDER — CIPROFLOXACIN 250 MG/1
500 TABLET, FILM COATED ORAL ONCE
Status: COMPLETED | OUTPATIENT
Start: 2018-12-07 | End: 2018-12-07

## 2018-12-07 RX ADMIN — LIDOCAINE HYDROCHLORIDE: 20 JELLY TOPICAL at 10:12

## 2018-12-07 RX ADMIN — CIPROFLOXACIN 500 MG: 250 TABLET, FILM COATED ORAL at 10:12

## 2018-12-07 NOTE — PATIENT INSTRUCTIONS
SIMPLE URODYNAMIC STUDY (SUDS) & CYSTOSCOPY  UROLOGY CLINIC DISCHARGE INSTRUCTIONS    You have had a procedure that will require time to properly heal. Follow the instructions you have been given on how to care for yourself once you are home. Below is additional information to help in your recovery.    ACTIVITY  · There are no restrictions in activity. Start doing again the things you did before the procedure.  · You may experience a slight burning sensation. You may notice a small amount of blood in your urine. This will clear up within a day. Call the clinic if this continues beyond 48 hours.    DIET  · Continue your normal diet. You may eat the same foods you ate before your procedure.  · Drink plenty of fluids during the first 24-48 hours following your procedure.    MEDICATIONS  · Resume all other previous medications from your prescribing physician.  · Continue any pre=procedure antibiotics until they are all gone.    SIGNS AND SYMPTOMS TO REPORT TO THE DOCTOR  · Chills or fever greater than 101° F within 24 hours of procedure.  · Changes in urination, such as increased bleeding, foul smell, cloudy urine, or painful urination.  · Call your doctor with any questions or concerns.    For any emergency situation, call 521 immediately or go to your nearest emergency room.    [facility]  [phone]    _                                                                                                                                                                                             If any problems after hours or weekends, you may call [phone] and ask for the urology resident on call.

## 2018-12-12 NOTE — PROCEDURES
Procedures     Urodynamic Report    Indication:  Urge incontinence    Patient was taken to the Urodynamic Suite with a comfortably full bladder and asked to perform a free uroflow.  Next, the patient was prepped and the urinary residual was drained with a 14 Fr catheter.  A 7 Fr dual lumen catheter was placed to measure intravesical pressures.  A 10 Fr balloon manometer was placed into the rectum for abdominal pressure measurements.  Patch EMG electrodes were placed on the perineum.  The patient was connected to the [employer] Urodynamic machine, using a multichannel technique, the data were interpreted.  The bladder was filled with sterile water at room temperature at a rate of 30 ml/min.  Patient is filled to urgency.  Filling is performed with the patient in the seated position.  Abdominal leak point pressures are checked at 1st desire, then serially at 50cc increments first with Valsalva then with coughing.  The patient was then asked to sit and void for a pressure flow study.    The following are the results of the study:  1.  Uroflow       Q max:  70.9 ml/sec       Voided volume:  143.6 ml       Pattern of the curve:  Intermittent with the manfred above baseline    2.  PVR:  25 ml    3.  CMG       Sensation:         First Desire:  127.5 ml         Normal Desire:  151.3 ml          Strong Desire:  262.1 ml         Urgency:  321.3 ml       Capacity:  340 ml       Abnormal Contractions:  none       Compliance:  normal    4.  Abdominal Leak Point Pressure:  No stress incontinence    5.  EMG:  Normal guarding, increased activity while voiding    6.  Voiding phase       Q max:  70.5 cm H2O       P det at Q max:  51.3 cm H2O       Pattern of the curve: intermittent with the manfred above baseline       Voided volume:  280.3 ml       PVR:  60 ml    7.  Analysis:  Normal sensation, capacity, dysfunctional voiding with relatively good emptying    8.  Recommendations:       A.  Recommended PT first, then would address the  urgency, frequency with Botox       B.  She was referred for PT       C.  Follow up in 3 months      CYSTOSCOPY REPORT    Pre Procedure Diagnosis:  Urgency and urge incontinence while being Neuromodulation    Post Procedure Diagnosis: same    Anesthesia: 10 cc 2% lidocaine jelly applied per urethra.    14 FR Flexible Olympus cystoscope used.    FINDINGS: Dome, anterior, posterior, lateral walls and bladder base free of urothelial abnormalities. Right and left ureteral orifices in the normal postion and configuration, both effluxed clear urine.  Bladder neck and urethra were normal.    Specimen:  none    The patient was taken to the cystoscopy suite and placed in dorsal lithotomy position.  The genitalia was prepped and draped  in the usual sterile fashion.  Two percent lidocaine jelly was inserted in the urethra.  After sufficent time had passed to allow good local anesthesia, the cystoscope was inserted in the urethra and passed into the bladder visualizing the urethra along its entire course.  The dome, anterior, posterior and lateral walls were examined systematically.  The ureteral orifices were in their usual position and configuration.  The cystoscope was turned upon itself 180 degrees to visualize the bladder neck.  The cystoscope was then brought to the level of the bladder neck, the water was turned on and the urethra was visualized.  The cystoscope was removed and the patient was instructed to urinate prior to leaving the office.     Post procedure medication:  cipro 500 mg x 1     ASSESSMENT/PLAN:  53 year old woman status post flexible cystoscopy.  1. Push fluids for 24 hours.  2. May see blood in the urine, this should gradually improve over the next 2-3 days.  3. The patient was instructed to return to the office or go to the emergency should fever, chills, cloudy urine, or inability to urinate develop.  4. Follow up in after she makes up her mind regarding above. .

## 2019-01-02 ENCOUNTER — TELEPHONE (OUTPATIENT)
Dept: UROLOGY | Facility: CLINIC | Age: 54
End: 2019-01-02

## 2019-01-02 NOTE — TELEPHONE ENCOUNTER
Spoke with patient.  Has refills on oxybutynin, but would like a 90 day supply instead to be sent to [facility] [address].  Patient stated the medication is working well.  Currently patient has a few on hand.  Informed Dr Mansfield will fill prescription upon her return tomorrow.  Voiced understanding.

## 2019-01-02 NOTE — TELEPHONE ENCOUNTER
----- Message from Jania Horn sent at 1/2/2019 10:16 AM CST -----  Contact: Self [phone]  Patient is calling to get refills on her medication sent to [facility] [id number] [address] AT [facility] OF [facility] [address] [facility] [address] [phone] (Phone)  [phone] (Fax)        1. oxybutynin (DITROPAN-XL) 10 MG 24 hr tablet 30 tablet

## 2019-01-03 RX ORDER — OXYBUTYNIN CHLORIDE 10 MG/1
10 TABLET, EXTENDED RELEASE ORAL DAILY
Qty: 90 TABLET | Refills: 3 | Status: SHIPPED | OUTPATIENT
Start: 2019-01-03 | End: 2019-01-31 | Stop reason: SDUPTHER

## 2019-01-31 ENCOUNTER — TELEPHONE (OUTPATIENT)
Dept: UROLOGY | Facility: CLINIC | Age: 54
End: 2019-01-31

## 2019-01-31 RX ORDER — OXYBUTYNIN CHLORIDE 10 MG/1
10 TABLET, EXTENDED RELEASE ORAL DAILY
Qty: 90 TABLET | Refills: 3 | Status: SHIPPED | OUTPATIENT
Start: 2019-01-31 | End: 2019-06-06 | Stop reason: SDUPTHER

## 2019-01-31 NOTE — TELEPHONE ENCOUNTER
----- Message from Marcelina Hemphill sent at 1/31/2019 11:52 AM CST -----  Contact: [phone]  Patient would like refill of oxybutynin (DITROPAN-XL) 10 MG 24 hr tablet sent to [email or web address] [id number]. Please call.

## 2019-01-31 NOTE — TELEPHONE ENCOUNTER
----- Message from Jania Horn sent at 1/31/2019  2:30 PM CST -----  Contact: Self [phone]  Patient Returning Your Phone Call

## 2019-01-31 NOTE — TELEPHONE ENCOUNTER
Spoke with Ms Bal and let her know the refill was ready for , she thanked me and stated that the oxybutynin is really working for her, I voiced my understanding.

## 2019-01-31 NOTE — TELEPHONE ENCOUNTER
Left a message For Ms Selby to let her know her refill request was approved and she can pick it as as soon as it is ready

## 2019-03-07 RX ORDER — OXYBUTYNIN CHLORIDE 10 MG/1
TABLET, EXTENDED RELEASE ORAL
Qty: 30 TABLET | Refills: 0 | Status: SHIPPED | OUTPATIENT
Start: 2019-03-07 | End: 2020-02-07 | Stop reason: SDUPTHER

## 2019-05-27 ENCOUNTER — TELEPHONE (OUTPATIENT)
Dept: SURGERY | Facility: CLINIC | Age: 54
End: 2019-05-27

## 2019-05-27 NOTE — TELEPHONE ENCOUNTER
Attempted to call pt, phone rang and rang then went dead.    ----- Message from Corinne E Coniglio, RN sent at 5/24/2019  2:50 PM CDT -----  Alyssa H Nguyen Corinne E Coniglio, RN  Caller: Patient (Today,  9:36 AM)         Needs Advice     Reason for call: Patient states that she had abnormal findings on her ultrasounds and mammogram, and the radiologist told her she needs to consider a mastectomy. Patient was taken aback by this so she is seeking a second opinion.          Communication Preference: [phone]

## 2019-05-28 ENCOUNTER — TELEPHONE (OUTPATIENT)
Dept: SURGERY | Facility: CLINIC | Age: 54
End: 2019-05-28

## 2019-05-28 NOTE — TELEPHONE ENCOUNTER
Contacted the patient regarding abnormal mammogram done at outside facility.  Explained to the patient the protocol here at [facility].  Answered all of the patient's questions.  Stated to the patient that we will need to get a copy of her screening mammogram done on 5/2/19, but that I will submit the reports and images that I have.  My name and direct number given to the patient.  The patient voiced understanding of this information.

## 2019-05-29 ENCOUNTER — HOSPITAL ENCOUNTER (OUTPATIENT)
Dept: RADIOLOGY | Facility: HOSPITAL | Age: 54
Discharge: HOME | End: 2019-05-29
Attending: SURGERY
Payer: COMMERCIAL

## 2019-05-30 ENCOUNTER — TELEPHONE (OUTPATIENT)
Dept: RADIOLOGY | Facility: HOSPITAL | Age: 54
End: 2019-05-30

## 2019-05-30 ENCOUNTER — TELEPHONE (OUTPATIENT)
Dept: SURGERY | Facility: CLINIC | Age: 54
End: 2019-05-30

## 2019-05-30 NOTE — TELEPHONE ENCOUNTER
Contacted the patient regarding breast imaging reports and disc that were brought in to our office.  Explained that the radiologist reviewed her images and that she will need to have a breast bx, but that the radiologist would like to other breast imaging form previous years to compare for stability.  Patient voiced understanding and stated that she would go  the information and bring to our office.

## 2019-05-30 NOTE — TELEPHONE ENCOUNTER
Spoke with patient. Reviewed breast biopsy procedure and reviewed instructions for breast biopsy. Patient expressed understanding and all questions were answered. Provided patient with my phone number to call for any further concerns or questions.   Patient scheduled breast surgery consult and  breast biopsy at the [facility] [place] on 6/6/2019.

## 2019-06-06 ENCOUNTER — HOSPITAL ENCOUNTER (OUTPATIENT)
Dept: RADIOLOGY | Facility: HOSPITAL | Age: 54
Discharge: HOME | End: 2019-06-06
Attending: PHYSICIAN ASSISTANT
Payer: COMMERCIAL

## 2019-06-06 ENCOUNTER — RESEARCH ENCOUNTER (OUTPATIENT)
Dept: RESEARCH | Facility: HOSPITAL | Age: 54
End: 2019-06-06

## 2019-06-06 ENCOUNTER — OFFICE VISIT (OUTPATIENT)
Dept: SURGERY | Facility: CLINIC | Age: 54
End: 2019-06-06
Payer: COMMERCIAL

## 2019-06-06 VITALS
SYSTOLIC BLOOD PRESSURE: 138 MMHG | DIASTOLIC BLOOD PRESSURE: 88 MMHG | BODY MASS INDEX: 35.53 KG/M2 | HEART RATE: 60 BPM | WEIGHT: 234.44 LBS | TEMPERATURE: 97 F | HEIGHT: 68 IN

## 2019-06-06 DIAGNOSIS — R92.8 ABNORMAL MAMMOGRAM: ICD-10-CM

## 2019-06-06 DIAGNOSIS — N64.4 BREAST PAIN: ICD-10-CM

## 2019-06-06 DIAGNOSIS — R92.8 ABNORMAL MAMMOGRAM: Primary | ICD-10-CM

## 2019-06-06 PROCEDURE — 27201044 MAMMO BREAST STEREOTACTIC BREAST BIOPSY RIGHT: Mod: PO

## 2019-06-06 PROCEDURE — 99202 PR OFFICE/OUTPT VISIT, NEW, LEVL II, 15-29 MIN: ICD-10-PCS | Mod: S$GLB,,, | Performed by: PHYSICIAN ASSISTANT

## 2019-06-06 PROCEDURE — 25000003 PHARM REV CODE 250: Mod: PO | Performed by: PHYSICIAN ASSISTANT

## 2019-06-06 PROCEDURE — 19081 BX BREAST 1ST LESION STRTCTC: CPT | Mod: PO,RT

## 2019-06-06 PROCEDURE — 88305 TISSUE EXAM BY PATHOLOGIST: CPT | Mod: 59 | Performed by: PATHOLOGY

## 2019-06-06 PROCEDURE — 88305 TISSUE EXAM BY PATHOLOGIST: CPT | Mod: 26,,, | Performed by: PATHOLOGY

## 2019-06-06 PROCEDURE — 88342 CHG IMMUNOCYTOCHEMISTRY: ICD-10-PCS | Mod: 26,,, | Performed by: PATHOLOGY

## 2019-06-06 PROCEDURE — 99999 PR PBB SHADOW E&M-EST. PATIENT-LVL III: ICD-10-PCS | Mod: PBBFAC,,, | Performed by: PHYSICIAN ASSISTANT

## 2019-06-06 PROCEDURE — 99999 PR PBB SHADOW E&M-EST. PATIENT-LVL III: CPT | Mod: PBBFAC,,, | Performed by: PHYSICIAN ASSISTANT

## 2019-06-06 PROCEDURE — 19081 MAMMO BREAST STEREOTACTIC BREAST BIOPSY RIGHT: ICD-10-PCS | Mod: RT,,, | Performed by: RADIOLOGY

## 2019-06-06 PROCEDURE — 19081 BX BREAST 1ST LESION STRTCTC: CPT | Mod: RT,,, | Performed by: RADIOLOGY

## 2019-06-06 PROCEDURE — 88342 IMHCHEM/IMCYTCHM 1ST ANTB: CPT | Mod: 26,,, | Performed by: PATHOLOGY

## 2019-06-06 PROCEDURE — 88305 TISSUE SPECIMEN TO PATHOLOGY, RADIOLOGY: ICD-10-PCS | Mod: 26,,, | Performed by: PATHOLOGY

## 2019-06-06 PROCEDURE — 99202 OFFICE O/P NEW SF 15 MIN: CPT | Mod: S$GLB,,, | Performed by: PHYSICIAN ASSISTANT

## 2019-06-06 PROCEDURE — 88341 PR IHC OR ICC EACH ADD'L SINGLE ANTIBODY  STAINPR: ICD-10-PCS | Mod: 26,,, | Performed by: PATHOLOGY

## 2019-06-06 PROCEDURE — 88341 IMHCHEM/IMCYTCHM EA ADD ANTB: CPT | Mod: 26,,, | Performed by: PATHOLOGY

## 2019-06-06 RX ORDER — PNV NO.95/FERROUS FUM/FOLIC AC 28MG-0.8MG
TABLET ORAL
COMMUNITY
Start: 2017-01-19

## 2019-06-06 RX ORDER — TRIAMCINOLONE ACETONIDE 1 MG/G
CREAM TOPICAL
Refills: 0 | COMMUNITY
Start: 2019-06-04

## 2019-06-06 RX ORDER — LIDOCAINE HYDROCHLORIDE 10 MG/ML
1 INJECTION, SOLUTION EPIDURAL; INFILTRATION; INTRACAUDAL; PERINEURAL ONCE
Status: COMPLETED | OUTPATIENT
Start: 2019-06-06 | End: 2019-06-06

## 2019-06-06 RX ORDER — LIDOCAINE HYDROCHLORIDE AND EPINEPHRINE 20; 10 MG/ML; UG/ML
10 INJECTION, SOLUTION INFILTRATION; PERINEURAL ONCE
Status: COMPLETED | OUTPATIENT
Start: 2019-06-06 | End: 2019-06-06

## 2019-06-06 RX ORDER — PREDNISONE 10 MG/1
TABLET ORAL
Refills: 0 | COMMUNITY
Start: 2019-06-04 | End: 2019-08-10

## 2019-06-06 RX ORDER — MELOXICAM 15 MG/1
15 TABLET ORAL
COMMUNITY
Start: 2017-07-14 | End: 2019-08-10

## 2019-06-06 RX ADMIN — LIDOCAINE HYDROCHLORIDE 1 ML: 10 INJECTION, SOLUTION EPIDURAL; INFILTRATION; INTRACAUDAL; PERINEURAL at 10:06

## 2019-06-06 RX ADMIN — LIDOCAINE HYDROCHLORIDE,EPINEPHRINE BITARTRATE 10 ML: 20; .01 INJECTION, SOLUTION INFILTRATION; PERINEURAL at 10:06

## 2019-06-06 NOTE — PROGRESS NOTES
[facility]  [facility] [place]  6/6/2019      SUBJECTIVE:   Ms. Alcira Selby is a 53 y.o. year old female with a history of RIGHT breast cancer who presents today for a clinical breast exam and biopsy after a recent abnormal mammogram of the right breast.      History of Present Illness: Patient has a history of Invasive Ductal Carcinoma of the RIGHT breast dating back to 2004 and treated at [facility].  She had a right lumpectomy, sentinel lymph node biopsy, and adjuvant radiation therapy.  This was followed by 5 years of treatment with Tamoxifen.  We do not have her records, but patient thinks she might have had all of her lymph nodes removed at the right axilla.      She had a second biopsy in 2012 of the right breast that was benign and the architectural distortion was consistent with a post lumpectomy scar.    Interval History:  She has had right breast pain for several years, and nothing seems to make it better or worse.  She describes it as a stinging pain that occurs daily and lasts for a few minutes.  She has a minimal caffeine intake of 1cup of coffee per day.  She denies recently palpating any breast masses bilaterally.  She denies any left breast pain.  She denies any nipple inversion or nipple discharge bilaterally.  She denies any recent weight loss or changes in vision.  She gets headaches often but this has been a chronic problem that preceded her initial breast cancer diagnosis in 2004.    Past Medical History:   Diagnosis Date    Breast cancer     2004     Past Surgical History:   Procedure Laterality Date    FOOT SURGERY Bilateral     Plantar Fascities    HYSTERECTOMY      KNEE SURGERY Left     Total Knee Replacement    MYOMECTOMY      TONSILLECTOMY       Social History     Socioeconomic History    Marital status:      Spouse name: Not on file    Number of children: Not on file    Years of education: Not on file    Highest education level: Not on file  "  Occupational History    Not on file   Social Needs    Financial resource strain: Not on file    Food insecurity:     Worry: Not on file     Inability: Not on file    Transportation needs:     Medical: Not on file     Non-medical: Not on file   Tobacco Use    Smoking status: Never Smoker    Smokeless tobacco: Never Used   Substance and Sexual Activity    Alcohol use: No     Frequency: Never    Drug use: No    Sexual activity: Not on file   Lifestyle    Physical activity:     Days per week: Not on file     Minutes per session: Not on file    Stress: Not on file   Relationships    Social connections:     Talks on phone: Not on file     Gets together: Not on file     Attends Gnosticism service: Not on file     Active member of club or organization: Not on file     Attends meetings of clubs or organizations: Not on file     Relationship status: Not on file   Other Topics Concern    Not on file   Social History Narrative    Not on file     Review of patient's allergies indicates:   Allergen Reactions    Sulfamethoxazole-trimethoprim      Hives and Itching    Sulfa (sulfonamide antibiotics) Rash      Family History: Great maternal aunt had breast cancer at unknown age and maternal aunt had breast cancer. Her maternal aunt used to work at [facility] (Dr. Harvey) and she is unsure if she had genetic testing.    Genetic testing: none  Ashkenazi inheritance: no  OB/Gyn history:    Number of pregnancies & age at first gestation:    Age of menarche & menopause: 13; 23 (total hysterectomy)   Last menstrual period: age 23   Body mass index is 35.65 kg/m².   Contraceptive Use/ HRT: OCP use x 4 years; denies any HRT.   Breastfeeding:"a few weeks"   Number of previous biopsies: 2 (2004 and , both of right breast).      Review of Systems: Review of Systems   Constitutional: Negative for chills and fever.   Respiratory: Negative for shortness of breath.    Cardiovascular: Negative for chest pain. "   Musculoskeletal: Negative for joint pain.   -See HPI/interval history for other systems reviewed.    OBJECTIVE:   Vitals:    06/06/19 0833   BP: 138/88   Pulse: 60   Temp: 96.8 °F (36 °C)      Physical Exam:  HEENT: Normocephalic, atraumatic.    CV: regular rate and rhythm without murmurs.  Resp: Lungs clear to auscultation and percussion bilaterally.  Breast: Diffuse bilateral fibrocystic changes.  Tenderness at the 11:30 position of the right breast.  No masses bilaterally.  Intact scars at lateral margins of both nipple areolar complexes.  No nipple discharge or nipple inversion bilaterally.  No changes in skin color or contour bilaterally.    Note: patient is unsure why she had a lumpectomy on the left side.  Lymph: No palpable adjacent lymph nodes.      5/2/19 Screening mammogram at :  Marker at central right breast with focal asymmetry and stable calcifications.    5/21/19 Diagnostic mammogram of right breast:  1.7 cm area of fine, pleomorphic calcifications at the 12 o'clock position of the right breast, BIRADS 4B.    ASSESSMENT:  Ms. Alcira Selby is a 53 y.o. year old female with a history of RIGHT breast cancer who presents today for a clinical breast exam and biopsy after a recent abnormal mammogram of the right breast.      PLAN:   We discussed that there was nothing concerning on physical exam today.  The calcifications at her right breast appear concerning on imaging and a biopsy is recommended.  She will have a mammogram and ultrasound of the right breast today, and I will call her to discuss the results.      ~Teresa Son PA-C      Surgical Oncology            6/6/2019

## 2019-06-06 NOTE — PROGRESS NOTES
Pt approached in Mammography clinic regarding participation in IRB protocol #2018.314, [facility] Breast Biopsy study. Pt was agreeable.      The Informed Consent Form (ICF) was reviewed with pt. The discussion included:   - participation is voluntary  - pt can change her mind about participating  - pt was informed that participation in this study would not preclude her from participating in any other research if offered  - specimens may be used by [facility] researchers or community researchers  - specimens collected include only those discussed with the patient at the time of consent and are indicated on the ICF   - specimens may be used for DNA, RNA or protein studies investigating biomarkers for diagnostic, prognostic or treatment purposes  - breast biopsy will be collected from [facility] after their approval  - all specimens released to researchers will be stripped of identifiers  - no personal medical information will be released to any parties outside of this research study  - there will be no other physical risks outside of those involved in standard of care procedure      Dr. Sow approved of patient's participation in the study.  Pt did not have any questions. Pt willingly and independently signed ICF.     A copy of signed ICF was given to pt with instructions to call with any questions that may arise or if she should change her mind regarding participation in study

## 2019-06-11 ENCOUNTER — DOCUMENTATION ONLY (OUTPATIENT)
Dept: SURGERY | Facility: CLINIC | Age: 54
End: 2019-06-11

## 2019-06-11 ENCOUNTER — TELEPHONE (OUTPATIENT)
Dept: SURGERY | Facility: CLINIC | Age: 54
End: 2019-06-11

## 2019-06-11 NOTE — PROGRESS NOTES
[facility] [place]  6/11/19    Ms. Selby is a 53 year old female with a history of Right breast cancer in 2004.  I saw her on 6/6/19 and she had a right breast biopsy that day consistent with DCIS, ER/SC+.  I called her today with these results, and she would like to see Dr. Vaughan next week.  One of our nurses will call her to set up this appointment.    ~Teresa Son PA-C

## 2019-06-11 NOTE — PROGRESS NOTES
[facility] called pt with biopsy results.  Called pt to schedule appt with breast surgeon. Pt wants to see Dr. Vaughan, discussed that he is out of town and will return on 6/18/2019. Pt verbalized understanding.Reviewed location of appt.

## 2019-06-11 NOTE — TELEPHONE ENCOUNTER
Contacted the regarding the message below. I stated to the patient that the mammo & ultrasound labs she brought to us on Friday 6/7/19 was enough information to scan in media, and that we did not need a copy of the films/images.        ----- Message from S[facility] sent at 6/7/2019  4:49 PM CDT -----  I don't need a copy of her images and since she had a biopsy yesterday I'm assuming radiology already requested them for comparison.    Thanks,  Teresa

## 2019-06-12 ENCOUNTER — TELEPHONE (OUTPATIENT)
Dept: SURGERY | Facility: CLINIC | Age: 54
End: 2019-06-12

## 2019-06-12 NOTE — TELEPHONE ENCOUNTER
Contacted the patient regarding the message below. I was able to fax her a copy of her most recent pathology reports.      ----- Message from Ayesha Shah sent at 6/12/2019  2:32 PM CDT -----  Contact: Pt.Self   Needs Advice    Reason for call:  Pt. States she would like to speak with nurse Ms.April in reference to getting a copy of her most recent pathology reports         Communication Preference:  [phone]     Additional Information:    Thank You :)

## 2019-06-13 ENCOUNTER — TELEPHONE (OUTPATIENT)
Dept: SURGERY | Facility: CLINIC | Age: 54
End: 2019-06-13

## 2019-06-13 NOTE — TELEPHONE ENCOUNTER
Contacted the patient regarding the message below. Patient voiced that she needed copies of her mammo & ultrasound. Patient voiced in the middle of the call that she didn't need me to send her the copies anymore, she realized that she could print the information herself.         ----- Message from Adalid Bradshaw sent at 6/13/2019 12:32 PM CDT -----  Contact: Pt  Needs Advice    Reason for call: The caller would like a call back from April Collins about papers that the Pt needs faxed over to her please.  The Pt will inform you of what she is looking for and she can't get it off of [facility].        Communication Preference:[phone]    Additional Information:

## 2019-06-18 ENCOUNTER — OFFICE VISIT (OUTPATIENT)
Dept: SURGERY | Facility: CLINIC | Age: 54
End: 2019-06-18
Payer: COMMERCIAL

## 2019-06-18 VITALS
SYSTOLIC BLOOD PRESSURE: 159 MMHG | HEART RATE: 76 BPM | DIASTOLIC BLOOD PRESSURE: 101 MMHG | BODY MASS INDEX: 35.88 KG/M2 | WEIGHT: 236.75 LBS | HEIGHT: 68 IN | TEMPERATURE: 98 F

## 2019-06-18 DIAGNOSIS — C50.911 MALIGNANT NEOPLASM OF RIGHT FEMALE BREAST, UNSPECIFIED ESTROGEN RECEPTOR STATUS, UNSPECIFIED SITE OF BREAST: ICD-10-CM

## 2019-06-18 DIAGNOSIS — Z01.818 PRE-OP TESTING: Primary | ICD-10-CM

## 2019-06-18 DIAGNOSIS — D05.11 DUCTAL CARCINOMA IN SITU (DCIS) OF RIGHT BREAST: ICD-10-CM

## 2019-06-18 PROCEDURE — 99215 PR OFFICE/OUTPT VISIT, EST, LEVL V, 40-54 MIN: ICD-10-PCS | Mod: S$GLB,,, | Performed by: SURGERY

## 2019-06-18 PROCEDURE — 99215 OFFICE O/P EST HI 40 MIN: CPT | Mod: S$GLB,,, | Performed by: SURGERY

## 2019-06-18 PROCEDURE — 99999 PR PBB SHADOW E&M-EST. PATIENT-LVL V: CPT | Mod: PBBFAC,,, | Performed by: SURGERY

## 2019-06-18 PROCEDURE — 99999 PR PBB SHADOW E&M-EST. PATIENT-LVL V: ICD-10-PCS | Mod: PBBFAC,,, | Performed by: SURGERY

## 2019-06-18 NOTE — LETTER
June 23, 2019      [facility]  [address]  [facility] [address]           [facility]  [address]  [facility] [address]  Phone: [phone]  Fax: [phone]          Patient: Alcira Selby   MR Number: [id number]   YOB: 1965   Date of Visit: 6/18/2019       Dear Teresa Son:    Thank you for referring Alcira Selby to me for evaluation. Attached you will find relevant portions of my assessment and plan of care.    If you have questions, please do not hesitate to call me. I look forward to following Alcira Selby along with you.    Sincerely,    Darin Vaughan MD    Enclosure  CC:  No Recipients    If you would like to receive this communication electronically, please contact [email or web address] or [phone] to request more information on [employer] Link access.    For providers and/or their staff who would like to refer a patient to [facility], please contact us through our one-stop-shop provider referral line, [facility] [place], at [phone].    If you feel you have received this communication in error or would no longer like to receive these types of communications, please e-mail [email or web address]

## 2019-06-18 NOTE — PROGRESS NOTES
"Breast Surgery  [facility]  Department of Surgery      REFERRING PROVIDER: SOUTH Drew  [address] [place] [address]    Chief Complaint: No chief complaint on file.    Subjective:      Patient ID: Alcira Selby is a 53 y.o. female with hx of R breast DCIS in  s/p lumpectomy and XRT with re-excision of close margins that was negative, s/p completion of tamoxifen . She presents with R breast DCIS, ER/NE +, nuclear grade III. 12:00 o'clock region anterior depth there are fine pleomorphic calcifications in a linear distribution measuring 17 mm in extent.     She endorses chronic right breast pain described as daily stinging x several minutes.   Patient does routinely do self breast exams.  Patient has not noted a change on breast exam.  Patient denies nipple discharge. Patient admits to to previous breast biopsy. Patient admits to a personal history of breast cancer.    Findings at that time were the following:   Tumor size: 17 mm   Tumor grade: high grade  Estrogen Receptor: +  Progesterone Receptor: +     In the right breast, 12:00 o'clock region anterior depth there are fine pleomorphic calcifications in a linear distribution measuring 17 mm in extent.     Family History: Great maternal aunt had breast cancer at unknown age   Maternal cousin ovarian cancer at 30  maternal aunt had breast cancer. Her maternal aunt used to work at [employer] and she is unsure if she had genetic testing.      Genetic testing: none    Ashkenazi inheritance: no    OB/Gyn history:               Number of pregnancies & age at first gestation:               Age of menarche & menopause: 13; 23 (total hysterectomy)              Last menstrual period: age 23              Body mass index is 35.65 kg/m².              Contraceptive Use/ HRT: OCP use x 4 years; denies any HRT.              Breastfeeding:"a few weeks"              Number of previous biopsies: 2 ( and , both of right breast).  "     Past Medical History:   Diagnosis Date    Breast cancer     2004     Past Surgical History:   Procedure Laterality Date    FOOT SURGERY Bilateral     Plantar Fascities    HYSTERECTOMY      KNEE SURGERY Left     Total Knee Replacement    MYOMECTOMY      TONSILLECTOMY       Current Outpatient Medications on File Prior to Visit   Medication Sig Dispense Refill    butalbital-acetaminophen-caff -40 mg -40 mg Cap Take by mouth once daily.      cyanocobalamin (VITAMIN B-12) 100 MCG tablet Take by mouth.      fluticasone (FLONASE) 50 mcg/actuation nasal spray 1 spray by Each Nare route once daily.      meloxicam (MOBIC) 15 MG tablet Take 15 mg by mouth.      oxybutynin (DITROPAN-XL) 10 MG 24 hr tablet TAKE 1 TABLET BY MOUTH ONCE DAILY 30 tablet 0    predniSONE (DELTASONE) 10 MG tablet TK 1 T PO  BID  0    prednisone 1 mg/mL Soln prednisone 5 mg tablet   TK 1 T PO  QD      triamcinolone acetonide 0.1% (KENALOG) 0.1 % cream APPLY AA BID PRN  0    vitamin B complex (SUPER B COMPLEX-B-12 ORAL) Take by mouth.       No current facility-administered medications on file prior to visit.      Social History     Socioeconomic History    Marital status:      Spouse name: Not on file    Number of children: Not on file    Years of education: Not on file    Highest education level: Not on file   Occupational History    Not on file   Social Needs    Financial resource strain: Not on file    Food insecurity:     Worry: Not on file     Inability: Not on file    Transportation needs:     Medical: Not on file     Non-medical: Not on file   Tobacco Use    Smoking status: Never Smoker    Smokeless tobacco: Never Used   Substance and Sexual Activity    Alcohol use: No     Frequency: Never    Drug use: No    Sexual activity: Not on file   Lifestyle    Physical activity:     Days per week: Not on file     Minutes per session: Not on file    Stress: Not on file   Relationships    Social  "connections:     Talks on phone: Not on file     Gets together: Not on file     Attends Mu-ism service: Not on file     Active member of club or organization: Not on file     Attends meetings of clubs or organizations: Not on file     Relationship status: Not on file   Other Topics Concern    Not on file   Social History Narrative    Not on file     Family History   Problem Relation Age of Onset    Hypertension Father     Hypertension Mother     Cancer Maternal Aunt         breast cancer      Review of Systems   As above    Objective:   BP (!) 159/101 (BP Location: Left arm, Patient Position: Sitting, BP Method: Large (Automatic))   Pulse 76   Temp 98.2 °F (36.8 °C) (Oral)   Ht 5' 8" (1.727 m)   Wt 107.4 kg (236 lb 12.4 oz)   BMI 36.00 kg/m²     Physical Exam  HEENT: Normocephalic, atraumatic.    CV: regular rate and rhythm without murmurs.  Resp: Lungs clear to auscultation and percussion bilaterally.  Breast:  Tenderness at the 12OC position of the right breast at bx site.  3 x 2 cm palpable mass, well healed scars at lateral margins of both nipple areolar complexes, no right axillary incision.  No nipple discharge or nipple inversion bilaterally.  Right breast volume much smaller especially inferior portion compared to left breast  Note: patient is unsure why she had a lumpectomy on the left side.  Lymph: No palpable adjacent lymph nodes    Radiology review: Images personally reviewed by me in the clinic.     5/2/19 Screening mammogram at :  Marker at central right breast with focal asymmetry and stable calcifications.     5/21/19 Diagnostic mammogram of right breast:  1.7 cm area of fine, pleomorphic calcifications at the 12 o'clock position of the right breast, BIRADS 4B.    Assessment:       R- breast recurrent DCIS    Plan:   - right breast completion mastectomy with SLNBx (at 12OC and high grade)  - not interested in reconstruction  - scheduled for July 17, consents obtained    Options for " management were discussed with the patient and her family. We reviewed the existing data noting the equivalency of breast conserving surgery with radiation therapy and mastectomy. We also reviewed the guidelines of the National Comprehensive Cancer Network for Stage 0 breast carcinoma. We discussed the need for lumpectomy margins to be negative for carcinoma, the necessity for postoperative radiation therapy after breast conservation in most cases, the possibility of a failed or false negative sentinel lymph node biopsy and the potential need for complete lymphadenectomy for a failed or positive sentinel lymph node biopsy were fully discussed. In the setting of mastectomy, delayed or immediate reconstruction options are available and were discussed.     In the setting of lumpectomy, radiation therapy would be recommended majority of the time.  The duration and treatment side effects were discussed with the patient.  This will coordinated with the radiation oncologist pending final pathology.    We also discussed the role of systemic therapy in the treatment of early stage breast cancer.  We discussed that this is based on tumor biology and loren status and will be determined based on final pathology.  We discussed that if the cancer is hormone positive, endocrine therapy would be recommended in most cases and its use can reduce the risk of recurrence as well as improve survival. Side effects of treatment were briefly discussed. We also discussed the potential role for chemotherapy based on a number of factors such as tumor phenotype (ER+ vs. triple negative vs. Xdn4jai+) and this would be determined in coordination with the medical oncologist.    The patient, in consultation with her family, has elected to proceed with right total mastectomy and sentinel lymph node biopsy. The operative risks of bleeding, infection, recurrence, scarring, and anesthetic complications and the possibility of requiring further surgery  were all noted and informed consent obtained.    Surgery scheduled. Follow-up in clinic roughly 14 days after surgery.     Patient was educated on breast cancer, receptors, wire localization lumpectomy, mastectomy, sentinel lymph node mapping and biopsy, axillary lymph node dissection, reconstruction, breast prosthesis with post-mastectomy bra and radiation therapy. Patient was given patient information binder including [facility] breast cancer treatment brochure.  All her questions were answered.    Total time spent with the patient: 60 minutes.  30 minutes of face to face consultation and 30 minutes of chart review and coordination of care.    Rdaha Jimenes MD  General Surgery, PGY-III    I have personally taken the history and examined this patient and agree with the resident's note as stated above.    Alcira Selby is a 53 y.o. female with hx of R breast DCIS in 2004 s/p lumpectomy and XRT with re-excision of close margins that was negative, s/p completion of tamoxifen 2009. She presents with R breast DCIS, ER/TN +, nuclear grade III. 12:00 o'clock region anterior depth there are fine pleomorphic calcifications in a linear distribution measuring 17 mm in extent.     Pt with about a 1.5 cm palpable mass consistent with biopsy changes in the superior left breast.  Recommend mastectomy given prior hx of XRT.  She is not interested in immediate reconstruction at this point.    She is consented and scheduled for a right total complete mastectomy with right axillary SLNB on 7-17-19 without reconstruction.

## 2019-06-18 NOTE — H&P (VIEW-ONLY)
"Breast Surgery  [facility]  Department of Surgery      REFERRING PROVIDER: SOUTH Drew  [address] [place] [address]    Chief Complaint: No chief complaint on file.    Subjective:      Patient ID: Alcira Selby is a 53 y.o. female with hx of R breast DCIS in  s/p lumpectomy and XRT with re-excision of close margins that was negative, s/p completion of tamoxifen . She presents with R breast DCIS, ER/DC +, nuclear grade III. 12:00 o'clock region anterior depth there are fine pleomorphic calcifications in a linear distribution measuring 17 mm in extent.     She endorses chronic right breast pain described as daily stinging x several minutes.   Patient does routinely do self breast exams.  Patient has not noted a change on breast exam.  Patient denies nipple discharge. Patient admits to to previous breast biopsy. Patient admits to a personal history of breast cancer.    Findings at that time were the following:   Tumor size: 17 mm   Tumor grade: high grade  Estrogen Receptor: +  Progesterone Receptor: +     In the right breast, 12:00 o'clock region anterior depth there are fine pleomorphic calcifications in a linear distribution measuring 17 mm in extent.     Family History: Great maternal aunt had breast cancer at unknown age   Maternal cousin ovarian cancer at 30  maternal aunt had breast cancer. Her maternal aunt used to work at [employer] and she is unsure if she had genetic testing.      Genetic testing: none    Ashkenazi inheritance: no    OB/Gyn history:               Number of pregnancies & age at first gestation:               Age of menarche & menopause: 13; 23 (total hysterectomy)              Last menstrual period: age 23              Body mass index is 35.65 kg/m².              Contraceptive Use/ HRT: OCP use x 4 years; denies any HRT.              Breastfeeding:"a few weeks"              Number of previous biopsies: 2 ( and , both of right breast).  "     Past Medical History:   Diagnosis Date    Breast cancer     2004     Past Surgical History:   Procedure Laterality Date    FOOT SURGERY Bilateral     Plantar Fascities    HYSTERECTOMY      KNEE SURGERY Left     Total Knee Replacement    MYOMECTOMY      TONSILLECTOMY       Current Outpatient Medications on File Prior to Visit   Medication Sig Dispense Refill    butalbital-acetaminophen-caff -40 mg -40 mg Cap Take by mouth once daily.      cyanocobalamin (VITAMIN B-12) 100 MCG tablet Take by mouth.      fluticasone (FLONASE) 50 mcg/actuation nasal spray 1 spray by Each Nare route once daily.      meloxicam (MOBIC) 15 MG tablet Take 15 mg by mouth.      oxybutynin (DITROPAN-XL) 10 MG 24 hr tablet TAKE 1 TABLET BY MOUTH ONCE DAILY 30 tablet 0    predniSONE (DELTASONE) 10 MG tablet TK 1 T PO  BID  0    prednisone 1 mg/mL Soln prednisone 5 mg tablet   TK 1 T PO  QD      triamcinolone acetonide 0.1% (KENALOG) 0.1 % cream APPLY AA BID PRN  0    vitamin B complex (SUPER B COMPLEX-B-12 ORAL) Take by mouth.       No current facility-administered medications on file prior to visit.      Social History     Socioeconomic History    Marital status:      Spouse name: Not on file    Number of children: Not on file    Years of education: Not on file    Highest education level: Not on file   Occupational History    Not on file   Social Needs    Financial resource strain: Not on file    Food insecurity:     Worry: Not on file     Inability: Not on file    Transportation needs:     Medical: Not on file     Non-medical: Not on file   Tobacco Use    Smoking status: Never Smoker    Smokeless tobacco: Never Used   Substance and Sexual Activity    Alcohol use: No     Frequency: Never    Drug use: No    Sexual activity: Not on file   Lifestyle    Physical activity:     Days per week: Not on file     Minutes per session: Not on file    Stress: Not on file   Relationships    Social  "connections:     Talks on phone: Not on file     Gets together: Not on file     Attends Jew service: Not on file     Active member of club or organization: Not on file     Attends meetings of clubs or organizations: Not on file     Relationship status: Not on file   Other Topics Concern    Not on file   Social History Narrative    Not on file     Family History   Problem Relation Age of Onset    Hypertension Father     Hypertension Mother     Cancer Maternal Aunt         breast cancer      Review of Systems   As above    Objective:   BP (!) 159/101 (BP Location: Left arm, Patient Position: Sitting, BP Method: Large (Automatic))   Pulse 76   Temp 98.2 °F (36.8 °C) (Oral)   Ht 5' 8" (1.727 m)   Wt 107.4 kg (236 lb 12.4 oz)   BMI 36.00 kg/m²     Physical Exam  HEENT: Normocephalic, atraumatic.    CV: regular rate and rhythm without murmurs.  Resp: Lungs clear to auscultation and percussion bilaterally.  Breast:  Tenderness at the 12OC position of the right breast at bx site.  3 x 2 cm palpable mass, well healed scars at lateral margins of both nipple areolar complexes, no right axillary incision.  No nipple discharge or nipple inversion bilaterally.  Right breast volume much smaller especially inferior portion compared to left breast  Note: patient is unsure why she had a lumpectomy on the left side.  Lymph: No palpable adjacent lymph nodes    Radiology review: Images personally reviewed by me in the clinic.     5/2/19 Screening mammogram at :  Marker at central right breast with focal asymmetry and stable calcifications.     5/21/19 Diagnostic mammogram of right breast:  1.7 cm area of fine, pleomorphic calcifications at the 12 o'clock position of the right breast, BIRADS 4B.    Assessment:       R- breast recurrent DCIS    Plan:   - right breast completion mastectomy with SLNBx (at 12OC and high grade)  - not interested in reconstruction  - scheduled for July 17, consents obtained    Options for " management were discussed with the patient and her family. We reviewed the existing data noting the equivalency of breast conserving surgery with radiation therapy and mastectomy. We also reviewed the guidelines of the National Comprehensive Cancer Network for Stage 0 breast carcinoma. We discussed the need for lumpectomy margins to be negative for carcinoma, the necessity for postoperative radiation therapy after breast conservation in most cases, the possibility of a failed or false negative sentinel lymph node biopsy and the potential need for complete lymphadenectomy for a failed or positive sentinel lymph node biopsy were fully discussed. In the setting of mastectomy, delayed or immediate reconstruction options are available and were discussed.     In the setting of lumpectomy, radiation therapy would be recommended majority of the time.  The duration and treatment side effects were discussed with the patient.  This will coordinated with the radiation oncologist pending final pathology.    We also discussed the role of systemic therapy in the treatment of early stage breast cancer.  We discussed that this is based on tumor biology and loren status and will be determined based on final pathology.  We discussed that if the cancer is hormone positive, endocrine therapy would be recommended in most cases and its use can reduce the risk of recurrence as well as improve survival. Side effects of treatment were briefly discussed. We also discussed the potential role for chemotherapy based on a number of factors such as tumor phenotype (ER+ vs. triple negative vs. Dai2dzf+) and this would be determined in coordination with the medical oncologist.    The patient, in consultation with her family, has elected to proceed with right total mastectomy and sentinel lymph node biopsy. The operative risks of bleeding, infection, recurrence, scarring, and anesthetic complications and the possibility of requiring further surgery  were all noted and informed consent obtained.    Surgery scheduled. Follow-up in clinic roughly 14 days after surgery.     Patient was educated on breast cancer, receptors, wire localization lumpectomy, mastectomy, sentinel lymph node mapping and biopsy, axillary lymph node dissection, reconstruction, breast prosthesis with post-mastectomy bra and radiation therapy. Patient was given patient information binder including [facility] breast cancer treatment brochure.  All her questions were answered.    Total time spent with the patient: 60 minutes.  30 minutes of face to face consultation and 30 minutes of chart review and coordination of care.    Radha Jimenes MD  General Surgery, PGY-III    I have personally taken the history and examined this patient and agree with the resident's note as stated above.    Alcira Selby is a 53 y.o. female with hx of R breast DCIS in 2004 s/p lumpectomy and XRT with re-excision of close margins that was negative, s/p completion of tamoxifen 2009. She presents with R breast DCIS, ER/SD +, nuclear grade III. 12:00 o'clock region anterior depth there are fine pleomorphic calcifications in a linear distribution measuring 17 mm in extent.     Pt with about a 1.5 cm palpable mass consistent with biopsy changes in the superior left breast.  Recommend mastectomy given prior hx of XRT.  She is not interested in immediate reconstruction at this point.    She is consented and scheduled for a right total complete mastectomy with right axillary SLNB on 7-17-19 without reconstruction.

## 2019-06-19 ENCOUNTER — TELEPHONE (OUTPATIENT)
Dept: SURGERY | Facility: CLINIC | Age: 54
End: 2019-06-19

## 2019-06-19 NOTE — TELEPHONE ENCOUNTER
----- Message from Abida Parks sent at 6/19/2019 12:18 PM CDT -----  Contact: Patient   Needs Advice    Reason for call: Patient is asking for a referral to see Dr. Benito, fax # [phone] if needed         Communication Preference: [phone]     Additional Information: please contact the patient

## 2019-06-20 ENCOUNTER — TELEPHONE (OUTPATIENT)
Dept: SURGERY | Facility: CLINIC | Age: 54
End: 2019-06-20

## 2019-06-20 NOTE — TELEPHONE ENCOUNTER
Spoke with pt. She was recently seen in Urgent care and referred for a thyroid nodule. Recent Thyroid US at [facility] in Jan 2019. Will email JOSE to receive records and CD of images from [facility]. Pt will sign and return and  records and CD.

## 2019-06-21 ENCOUNTER — TELEPHONE (OUTPATIENT)
Dept: SURGERY | Facility: CLINIC | Age: 54
End: 2019-06-21

## 2019-06-21 NOTE — TELEPHONE ENCOUNTER
Spoke with JOVANI Jj Case Manager from [employer] regarding patient, requesting clinical notes and pathology results for review

## 2019-06-21 NOTE — TELEPHONE ENCOUNTER
----- Message from Marija Quiñones sent at 6/21/2019  9:35 AM CDT -----  Contact: Rodney Jj   Needs Advice    Reason for call: pt calling to speak with nurse in regards to the pt's upcoming surgery.          Communication Preference: [phone] ext [phone]    Additional Information: please contact caller regarding this matter

## 2019-06-21 NOTE — TELEPHONE ENCOUNTER
Pt records were received via fax and she will bring a copy of the CD with images for the Thyroid US. Referred by Dr. Janiya Parks at an Urgent Care clinic.

## 2019-06-23 PROBLEM — C50.911 MALIGNANT NEOPLASM OF RIGHT FEMALE BREAST: Status: ACTIVE | Noted: 2019-06-23

## 2019-06-23 PROBLEM — D05.11 DUCTAL CARCINOMA IN SITU (DCIS) OF RIGHT BREAST: Status: ACTIVE | Noted: 2019-06-23

## 2019-06-23 PROBLEM — Z01.818 PRE-OP TESTING: Status: ACTIVE | Noted: 2019-06-23

## 2019-06-24 ENCOUNTER — HOSPITAL ENCOUNTER (OUTPATIENT)
Dept: RADIOLOGY | Facility: HOSPITAL | Age: 54
Discharge: HOME | End: 2019-06-24
Attending: SURGERY
Payer: COMMERCIAL

## 2019-06-24 ENCOUNTER — HOSPITAL ENCOUNTER (OUTPATIENT)
Dept: CARDIOLOGY | Facility: CLINIC | Age: 54
Discharge: HOME | End: 2019-06-24
Payer: COMMERCIAL

## 2019-06-24 DIAGNOSIS — Z01.818 PRE-OP TESTING: ICD-10-CM

## 2019-06-24 PROCEDURE — 93010 EKG 12-LEAD: ICD-10-PCS | Mod: S$GLB,,, | Performed by: INTERNAL MEDICINE

## 2019-06-24 PROCEDURE — 93005 EKG 12-LEAD: ICD-10-PCS | Mod: S$GLB,,, | Performed by: SURGERY

## 2019-06-24 PROCEDURE — 93010 ELECTROCARDIOGRAM REPORT: CPT | Mod: S$GLB,,, | Performed by: INTERNAL MEDICINE

## 2019-06-24 PROCEDURE — 71046 XR CHEST PA AND LATERAL: ICD-10-PCS | Mod: 26,,, | Performed by: RADIOLOGY

## 2019-06-24 PROCEDURE — 93005 ELECTROCARDIOGRAM TRACING: CPT | Mod: S$GLB,,, | Performed by: SURGERY

## 2019-06-24 PROCEDURE — 71046 X-RAY EXAM CHEST 2 VIEWS: CPT | Mod: 26,,, | Performed by: RADIOLOGY

## 2019-06-24 PROCEDURE — 71046 X-RAY EXAM CHEST 2 VIEWS: CPT | Mod: TC,FY

## 2019-06-25 ENCOUNTER — CLINICAL SUPPORT (OUTPATIENT)
Dept: REHABILITATION | Facility: HOSPITAL | Age: 54
End: 2019-06-25
Attending: SURGERY
Payer: COMMERCIAL

## 2019-06-25 DIAGNOSIS — M25.60 STIFFNESS DUE TO IMMOBILITY: ICD-10-CM

## 2019-06-25 DIAGNOSIS — M79.601 PAIN OF RIGHT UPPER EXTREMITY: ICD-10-CM

## 2019-06-25 DIAGNOSIS — Z74.09 STIFFNESS DUE TO IMMOBILITY: ICD-10-CM

## 2019-06-25 DIAGNOSIS — Z91.89 AT RISK FOR LYMPHEDEMA: ICD-10-CM

## 2019-06-25 DIAGNOSIS — C50.911 MALIGNANT NEOPLASM OF RIGHT FEMALE BREAST, UNSPECIFIED ESTROGEN RECEPTOR STATUS, UNSPECIFIED SITE OF BREAST: Primary | ICD-10-CM

## 2019-06-25 PROCEDURE — 97110 THERAPEUTIC EXERCISES: CPT | Mod: PN

## 2019-06-25 PROCEDURE — 97165 OT EVAL LOW COMPLEX 30 MIN: CPT | Mod: PN

## 2019-06-25 NOTE — PLAN OF CARE
"  [facility] Occupational Therapy  Initial Evaluation     Date: 6/25/2019  Patient: Alcira Selby  Chart Number: [id number]    Therapy Diagnosis:   Encounter Diagnoses   Name Primary?    At risk for lymphedema     Malignant neoplasm of right female breast, unspecified estrogen receptor status, unspecified site of breast Yes    Stiffness due to immobility     Pain of right upper extremity      Physician: Darin Vaughan MD    Physician Orders: OT evaluate and treat  Medical Diagnosis: C50.911 (ICD-10-CM) - Malignant neoplasm of right female breast, unspecified estrogen receptor status, unspecified site of breast  Evaluation Date: 6/25/2019  Insurance Authorization period Expiration: 12/31/2019  Plan of Care Expiration Period: 8/23/2019    Visit # / Visits Authortized: 1 / 20  Time In:1110  Time Out: 1150  Total Billable Time: 40 minutes  LCE1 TE1    Precautions: Standard and cancer      Subjective   History of Present Illness: Alcira Selby is a 53 y.o. female with hx of R breast DCIS in 2004 s/p lumpectomy and XRT with re-excision of close margins that was negative, s/p completion of tamoxifen 2009. She presents with R breast DCIS, ER/AK +, nuclear grade III. 12:00 o'clock region anterior depth there are fine pleomorphic calcifications in a linear distribution measuring 17 mm in extent.     Surgery: Right Mastectomy with SLNBx scheduled for 7/17/2019  Chemotherapy: TBD  Radiation: TBD  Chief complaint: Right breast pain "a little bit"    Patient's Goals for Therapy: to return to PLOF    Hand dominance: right    Pain:  Functional Pain Scale Rating 0-10:   4/10 on average  2/10 at best  6/10 at worst  Location: Right Breast  Description: Dull  Aggravating Factors: Night Time  Easing Factors: nothing    Occupation:  Was working however is not anymore secondary to 2 knee surgeries.     Functional Limitations/Social History:    Previous functional status includes: Independent with all ADLs. "     Current FunctionalStatus   Home/Living environment : lives with their family      Limitation of Functional Status as follows:   ADLs/IADLs:     - Feeding: Independent    - Bathing: Independent    - Dressing/Grooming: Independent    - Driving: Independent     Leisure: Independent    Past Medical History:   Diagnosis Date    Breast cancer     2004     Alcira Selby  has a past surgical history that includes Knee surgery (Left); Hysterectomy; Tonsillectomy; Myomectomy; and Foot surgery (Bilateral).    Alcira has a current medication list which includes the following prescription(s): butalbital-acetaminophen-caff -40 mg, cyanocobalamin, fluticasone propionate, meloxicam, oxybutynin, prednisone, prednisone, triamcinolone acetonide 0.1%, and vitamin b complex.    Review of patient's allergies indicates:   Allergen Reactions    Sulfamethoxazole-trimethoprim      Hives and Itching    Sulfa (sulfonamide antibiotics) Rash        Objective   Mental status :alert    Postural examination/scapula alignment: Rounded shoulder and Head forward    Skin Integrity:   Scar Location: NA  Appearance: NA  Signs of infection: No  Drainage:NA  Color:NA    Edema: none  Location: -    Axillary Web Syndrome/Cording:   Location: NA  Degree of Cording: - (mild, moderate etc...)   Number of cords present: -    Sensation: WNL         Range of Motion:     Range of Motion/Strength:   Shoulder  Right   Left  Pain/Dysfunction with Movement    AROM PROM MMT AROM PROM MMT    Flexion 160 WNL 5/5 140 WNL 5/5    Extension 65 WNL 5/5 60 WNL 5/5    Abduction 160 WNL 5/5 135 WNL 5/5    HorizAdduction 35 WNL 5/5 25 WNL 5/5    Internal rotation L4 WNL 5/5 L4 WNL 5/5    ER at 90° abd 90 WNL 5/5 90 WNL 5/5    ER at 0° abd 80 WNL 5/5 80 WNL 5/5      ROM Comments: none    Painful Arc: None noted    Tenderness upon Palpation:      Negative: Infraspinatus Region, AC joint, Bicipital Groove and Supraspinatus Region    Scapular Control/Dyskinesis:     "Normal / Subtle / Obvious  Comments    Left  Normal -    Right  Normal -     Baseline Measurements of BL UE's for early detection of Lymphedema:     LANDMARK RIGHT UE LEFT UE DIFFERENCE   E + 8" 37.5 cm 35.5 cm 2 cm   E + 6" 40 cm 35.5 cm .5 cm   E + 4" 37.5 cm 37 cm .5 cm   E + 2" 36 cm 35.5 cm .5 cm   Elbow 30 cm 31.5 cm 1.5 cm   W+ 8" 30 cm 28 cm 2 cm   W +  6" 26.5 cm 25.2 cm .3 cm   W + 4" 22 cm 21.5 cm .5 cm   Wrist 16.2 cm 16 cm .2 cm   DPC 20.5 cm 19 cm 1.5 cm   IP Thumb 6.5 cm 6.5 cm 0 cm     CMS Impairment/Limitation/Restriction for FOTO Shoulder Survey    Therapist reviewed FOTO scores for Alcira Selby on 6/25/2019.   FOTO documents entered into [employer] - see Media section.    Limitation Score: 30%  Category: Self Care    Current : CJ = at least 20% but < 40% impaired, limited or restricted  Goal: CH = 0 % impaired, limited or restricted       TREATMENT   Treatment Time In: 1140  Treatment Time Out: 1150  Total Treatment time separate from Evaluation: 10 minutes  - role of OT in multi - disciplinary team, goals for OT  - Pt was educated in lymphedema etiology and management plans.    - Pt was provided with written risk reductions and precautions for managing lymphedema.   - Reviewed LINDA drain care instructions.      ROM/lifting Precautions post surgery discussed -  until drains have been removed:  - do not lift affected arm above 90 degrees of shoulder flexion  - do not lift over 5 lbs  - do not pull or push heavy objects  - do not sleep on your stomach or surgery side      Written Home Exercises Provided and Patient Education: Handouts given   Pt was instructed in and performed therapeutic exercise for postural correction and alignment, stretching and soft tissue mobility, and strengthening.      Exercises included: handout given    - exaggerated deep breathing and relaxation  - scapular retractions  - wrist circles  - elbow flexion/extension    Pt was able to demonstrate and report understanding and " performance     Pt has no cultural, educational or language barriers to learning provided.    Assessment     Alcira Selby is a 53 y.o. female referred to outpatient occupational therapy and presents with a medical diagnosis of right breast cancer and was seen today pre-operatively to assess strength and ROM of BL UEs, to take baseline circumferential measurements of BL UEs to aid in the early detection of lymphedema and provide pt education on exercises/precations post breast surgery. Pt does not exhibit any ROM impairments  Pt educated in lymphedema risks/precautions as well as ROM/lifting precautions post surgery - pt demonstrated/verbalized understanding. No goals established this visit as goals for OT will be established post surgery at follow up.     Anticipated barriers to occupational therapy: none    Pt has no cultural, educational or language barriers to learning provided.    Profile and History Assessment of Occupational Performance Level of Clinical Decision Making Complexity Score   Occupational Profile:   Alcira Selby is a 53 y.o. female who lives with their family and is currently home-maker. Alcira Selby is Independent with his/her daily functional abilities. His/her main goal for therapy is to return to PLOF.     Comorbidities:   high BMI and history of cancer    Medical and Therapy History Review:   Expanded               Performance Deficits    Physical:  No Deficits    Cognitive:  No Deficits    Psychosocial:    No Deficits     Clinical Decision Making:  low    Assessment Process:  Problem-Focused Assessments    Modification/Need for Assistance:  Not Necessary    Intervention Selection:  Several Treatment Options       low  Based on PMHX, co morbidities , data from assessments and functional level of assistance required with task and clinical presentation directly impacting function.       Goals:   TBD after re-evaluation after surgery    Plan     Certification Period/Plan of care  expiration: 6/25/2019 to 8/25/2019.    Schedule patient for follow up with Occupational therapy post surgery. Goals for therapy post surgery will be established at that time.     NARGIS Hernandez

## 2019-06-25 NOTE — PATIENT INSTRUCTIONS
PRE/POST OP PATIENT EDUCATION    Post Operative Instructions     Range of Motion/lifting Precautions post surgery  The following activities should be avoided until your drain(s) have been removed  - do not lift affected arm above 90 degrees of shoulder flexion  - do not lift over 5 lbs  - do not pull or push heavy objects  - do not sleep on your stomach or surgery side       After surgery, you may begin self-care tasks including grooming, dressing, feeding and simple hygiene as soon as you feel up to it.    Schedule your post-op therapy follow-up after your drains have been removed     When to call your doctor   - if any part of your affected arm or axilla feels hot, is reddened or has increased swelling   - if you develop a temperature over 101 degrees Fahrenheit      Lymphedema - Identification and Prevention     Lymphedema - is the swelling of a body area or extremity caused by the accumulation of lymphatic fluid.  There is a risk for lymphedema with the removal of lymph nodes, trauma or radiation therapy.  Treatment of breast cancer often involves surgery: mastectomy or lumpectomy. Some of the lymph nodes in the underarm (called axillary lymph nodes) may be removed and checked to see if they contain cancer cells.     During breast surgery when axillary lymph nodes are removed (with sentinel node biopsy or axillary dissection) or are treated with radiation therapy, the lymphatic system may become impaired. This may prevent lymphatic fluid from leaving the area therefore, causing lymphedema.     Lymphatic fluid is a normal part of the circulatory system. Its function is to remove waste products and to produce cells vital to fighting infection. Swelling occurs when the vessels become restricted and the lymphatic fluid is unable to freely flow through them.  If lymphedema is left untreated, the affected limb could progressively become more swollen, which could lead to hardening  of the skin, bulkiness in the limb, infection and impaired wound healing.         There are things you can do to decrease the chance of developing lymphedema.                                          [email or web address]                                                                                                                                                  The information presented is intended for general information and educational purposes. It is not intended to replace the  advice of your health care provider. Contact your health care provider if you believe you have a health problem.                                                    POST OP EXERCISES - SAFE TO DO THE FIRST 2 WEEKS AFTER SURGERY UNTIL YOUR FOLLOW UP APPOINTMENT WITH PHYSICAL/OCCUPATIONAL THERAPY    Scapular Retraction (Standing)    With arms at sides, squeeze shoulder blades together. Do not shrug shoulders and do not hold your breath. Hold 5 seconds. Repeat 15 times 3 sessions 1-2 x day.       Exaggerated Breathing and Relaxation      Practice deep breathing frequently in the first few days following surgery even before you begin exercising. This exercise helps with tissue extensibility in the chest wall.  Inhale slowly and deeply through the nose and exhale through pursed lips. Concentrate on relaxing as you let the air out of your lungs. Repeat three (3) to four (4) times, remembering to breath in deeply and then relaxing. This exercise helps to ease the sensation of pulling and discomfort that may be experienced while exercising.      Ball Squeeze OR Hand pumps       Perform this exercise three (3) times a day for 1-3 minutes each time.    The ball squeeze or hand pumps helps to prevent or reduce temporary swelling that may occur in the affected arm. This exercise may be performed standing, sitting or while lying in bed. During this exercise the affected arm should be slightly bent and held upward. Support your arm with a  pillow if you are uncomfortable holding it up.    a. Hold a rubber ball in your hand on the affected side and lift your arm upward.  b. Alternate squeezing and relaxing the ball.      Pendulum Swing      Perform this exercise (2) times a day with ten (10) repetitions.    a. Rest your other hand on the back of a chair or table to steady yourself. Bend forward at the wrist with the involved arm hanging freely toward the floor.  b. Gently swing the involved arm forward and backward, gradually increasing the size of the swing.  c. Next, gently swing the involved arm side to side, gradually increasing the size of the swing.  d. Then make small circles with the involved arm and gradually increase the size of the Torres [place]. Repeat making circles in the opposite direction beginning with small circles and gradually increasing the Torres Martinez size.          AROM: Elbow Flexion / Extension        With left hand palm up, gently bend elbow as far as possible. Then straighten arm as far as possible. Do this in standing.   Repeat 15 times per set. Do 3 sets per session. Do 1-3 sessions per day.

## 2019-07-01 ENCOUNTER — TELEPHONE (OUTPATIENT)
Dept: SURGERY | Facility: CLINIC | Age: 54
End: 2019-07-01

## 2019-07-01 NOTE — TELEPHONE ENCOUNTER
Called and spoke with patient, she would like to come in. Scheduled for 7/11 at 11:45 with Dr. Vaughan. Patient voiced understanding of appt date and time. Appt letter sent.    ----- Message from Darin Vaughan MD sent at 6/30/2019  9:04 AM CDT -----  Why don't we bring her back one more time pre-op to see what's going on with her thyroid.  Perhaps if she needs something done to her thyroid we can handle it all at one time.  Thanks, [facility].  ----- Message -----  From: Missy Mead MA  Sent: 6/26/2019  11:20 AM  To: Darin Vaughan MD    Good morning, it looks like her appointment notes for her post op on 7/25 now mention the thyroid. Does the patient need to see Dr. Gallego's office?    Missy MALONEY  ----- Message -----  From: Tika Mcgarry MD  Sent: 6/25/2019   2:34 PM  To: [facility] Staff, #    Patient scheduled to see me on Thursday.  Already seeing Alexus and scheduled for breast surgery.  Does she want to see him for both.  Save her a visit possibly?    aob

## 2019-07-11 ENCOUNTER — OFFICE VISIT (OUTPATIENT)
Dept: SURGERY | Facility: CLINIC | Age: 54
End: 2019-07-11
Payer: COMMERCIAL

## 2019-07-11 VITALS
BODY MASS INDEX: 35.17 KG/M2 | SYSTOLIC BLOOD PRESSURE: 151 MMHG | HEART RATE: 71 BPM | HEIGHT: 68 IN | DIASTOLIC BLOOD PRESSURE: 90 MMHG | WEIGHT: 232.06 LBS | TEMPERATURE: 99 F

## 2019-07-11 DIAGNOSIS — E04.9 THYROID GOITER: Primary | ICD-10-CM

## 2019-07-11 PROCEDURE — 99999 PR PBB SHADOW E&M-EST. PATIENT-LVL III: CPT | Mod: PBBFAC,,, | Performed by: SURGERY

## 2019-07-11 PROCEDURE — 99214 PR OFFICE/OUTPT VISIT, EST, LEVL IV, 30-39 MIN: ICD-10-PCS | Mod: S$GLB,,, | Performed by: SURGERY

## 2019-07-11 PROCEDURE — 99214 OFFICE O/P EST MOD 30 MIN: CPT | Mod: S$GLB,,, | Performed by: SURGERY

## 2019-07-11 PROCEDURE — 99999 PR PBB SHADOW E&M-EST. PATIENT-LVL III: ICD-10-PCS | Mod: PBBFAC,,, | Performed by: SURGERY

## 2019-07-11 RX ORDER — DOCUSATE SODIUM 100 MG/1
100 CAPSULE, LIQUID FILLED ORAL 2 TIMES DAILY
COMMUNITY

## 2019-07-11 NOTE — PROGRESS NOTES
Breast Surgery  [facility]  Department of Surgery      REFERRING PROVIDER: No referring provider defined for this encounter.    Chief Complaint: Pre-op Exam (thyroid)    Subjective:      Interval Hx: Patient is scheduled for right breast mastectomy and SLNB for DCIS, but she presents today to discuss her history of thyroid nodules in case something should be done surgically at the same time. She denies any hyper- or hypothyroidism signs. Denies any SOA or dysphagia, or food catching in her throat.    Patient ID: Alcira Selby is a 53 y.o. female with hx of R breast DCIS in  s/p lumpectomy and XRT with re-excision of close margins that was negative, s/p completion of tamoxifen . She presents with R breast DCIS, ER/[place] +, nuclear grade III. 12:00 o'clock region anterior depth there are fine pleomorphic calcifications in a linear distribution measuring 17 mm in extent.     She endorses chronic right breast pain described as daily stinging x several minutes.   Patient does routinely do self breast exams.  Patient has not noted a change on breast exam.  Patient denies nipple discharge. Patient admits to to previous breast biopsy. Patient admits to a personal history of breast cancer.    Findings at that time were the following:   Tumor size: 17 mm   Tumor grade: high grade  Estrogen Receptor: +  Progesterone Receptor: +     In the right breast, 12:00 o'clock region anterior depth there are fine pleomorphic calcifications in a linear distribution measuring 17 mm in extent.     Family History: Great maternal aunt had breast cancer at unknown age   Maternal cousin ovarian cancer at 30  maternal aunt had breast cancer. Her maternal aunt used to work at [employer] and she is unsure if she had genetic testing.      Genetic testing: none    Ashkenazi inheritance: no    OB/Gyn history:               Number of pregnancies & age at first gestation:               Age of menarche & menopause: 13; 23 (total  "hysterectomy)              Last menstrual period: age 23              Body mass index is 35.65 kg/m².              Contraceptive Use/ HRT: OCP use x 4 years; denies any HRT.              Breastfeeding:"a few weeks"              Number of previous biopsies: 2 (2004 and 2012, both of right breast).      Past Medical History:   Diagnosis Date    Breast cancer     2004     Past Surgical History:   Procedure Laterality Date    FOOT SURGERY Bilateral     Plantar Fascities    HYSTERECTOMY      KNEE SURGERY Left     Total Knee Replacement    MYOMECTOMY      TONSILLECTOMY       Current Outpatient Medications on File Prior to Visit   Medication Sig Dispense Refill    butalbital-acetaminophen-caff -40 mg -40 mg Cap Take by mouth once daily.      cyanocobalamin (VITAMIN B-12) 100 MCG tablet Take by mouth.      docusate sodium (COLACE) 100 MG capsule Take 100 mg by mouth 2 (two) times daily.      fluticasone (FLONASE) 50 mcg/actuation nasal spray 1 spray by Each Nare route once daily.      meloxicam (MOBIC) 15 MG tablet Take 15 mg by mouth.      oxybutynin (DITROPAN-XL) 10 MG 24 hr tablet TAKE 1 TABLET BY MOUTH ONCE DAILY 30 tablet 0    predniSONE (DELTASONE) 10 MG tablet TK 1 T PO  BID  0    triamcinolone acetonide 0.1% (KENALOG) 0.1 % cream APPLY AA BID PRN  0    vitamin B complex (SUPER B COMPLEX-B-12 ORAL) Take by mouth.      [DISCONTINUED] prednisone 1 mg/mL Soln prednisone 5 mg tablet   TK 1 T PO  QD       No current facility-administered medications on file prior to visit.      Social History     Socioeconomic History    Marital status:      Spouse name: Not on file    Number of children: Not on file    Years of education: Not on file    Highest education level: Not on file   Occupational History    Not on file   Social Needs    Financial resource strain: Not on file    Food insecurity:     Worry: Not on file     Inability: Not on file    Transportation needs:     Medical: Not on " "file     Non-medical: Not on file   Tobacco Use    Smoking status: Never Smoker    Smokeless tobacco: Never Used   Substance and Sexual Activity    Alcohol use: No     Frequency: Never    Drug use: No    Sexual activity: Not on file   Lifestyle    Physical activity:     Days per week: Not on file     Minutes per session: Not on file    Stress: Not on file   Relationships    Social connections:     Talks on phone: Not on file     Gets together: Not on file     Attends Taoism service: Not on file     Active member of club or organization: Not on file     Attends meetings of clubs or organizations: Not on file     Relationship status: Not on file   Other Topics Concern    Not on file   Social History Narrative    Not on file     Family History   Problem Relation Age of Onset    Hypertension Father     Hypertension Mother     Cancer Maternal Aunt         breast cancer      Review of Systems   As above    Objective:   BP (!) 151/90 (BP Location: Left arm, Patient Position: Sitting, BP Method: Medium (Automatic))   Pulse 71   Temp 98.7 °F (37.1 °C) (Oral)   Ht 5' 8" (1.727 m)   Wt 105.3 kg (232 lb 0.6 oz)   BMI 35.28 kg/m²     Physical Exam  HEENT: Normocephalic, atraumatic.    CV: regular rate and rhythm without murmurs.  Resp: Lungs clear to auscultation and percussion bilaterally.  Neck: nontender goiter present  Breast:  Tenderness at the 12OC position of the right breast at bx site.  3 x 2 cm palpable mass, well healed scars at lateral margins of both nipple areolar complexes, no right axillary incision.  No nipple discharge or nipple inversion bilaterally.  Right breast volume much smaller especially inferior portion compared to left breast  Note: patient is unsure why she had a lumpectomy on the left side.  Lymph: No palpable adjacent lymph nodes    Radiology review: Images personally reviewed by me in the clinic.     5/2/19 Screening mammogram at :  Marker at central right breast with focal " asymmetry and stable calcifications.     5/21/19 Diagnostic mammogram of right breast:  1.7 cm area of fine, pleomorphic calcifications at the 12 o'clock position of the right breast, BIRADS 4B.    Thyroid U/S at OSH (in [place]) from 1/2019: A 7 mm right isoechoic nodule, and a 4 mm cyst noted in inferior pole.    Assessment:       R- breast recurrent DCIS  Has thyroid goiter    Plan:   - Will send for TSH and T4 today  - Follow up in January 2020 with repeat thyroid U/S and clinic appt  - right breast completion mastectomy with SLNBx (at 12OC and high grade)  - not interested in immediate reconstruction  - scheduled for July 17, consents obtained    Options for management were discussed with the patient and her family. We reviewed the existing data noting the equivalency of breast conserving surgery with radiation therapy and mastectomy. We also reviewed the guidelines of the National Comprehensive Cancer Network for Stage 0 breast carcinoma. We discussed the need for lumpectomy margins to be negative for carcinoma, the necessity for postoperative radiation therapy after breast conservation in most cases, the possibility of a failed or false negative sentinel lymph node biopsy and the potential need for complete lymphadenectomy for a failed or positive sentinel lymph node biopsy were fully discussed. In the setting of mastectomy, delayed or immediate reconstruction options are available and were discussed.     In the setting of lumpectomy, radiation therapy would be recommended majority of the time.  The duration and treatment side effects were discussed with the patient.  This will coordinated with the radiation oncologist pending final pathology.    We also discussed the role of systemic therapy in the treatment of early stage breast cancer.  We discussed that this is based on tumor biology and loren status and will be determined based on final pathology.  We discussed that if the cancer is hormone positive,  endocrine therapy would be recommended in most cases and its use can reduce the risk of recurrence as well as improve survival. Side effects of treatment were briefly discussed. We also discussed the potential role for chemotherapy based on a number of factors such as tumor phenotype (ER+ vs. triple negative vs. Wza5fis+) and this would be determined in coordination with the medical oncologist.    The patient, in consultation with her family, has elected to proceed with right total mastectomy and sentinel lymph node biopsy. The operative risks of bleeding, infection, recurrence, scarring, and anesthetic complications and the possibility of requiring further surgery were all noted and informed consent obtained.    Surgery scheduled. Follow-up in clinic roughly 14 days after surgery.     Patient was educated on breast cancer, receptors, wire localization lumpectomy, mastectomy, sentinel lymph node mapping and biopsy, axillary lymph node dissection, reconstruction, breast prosthesis with post-mastectomy bra and radiation therapy. Patient was given patient information binder including [facility] breast cancer treatment brochure.  All her questions were answered.    Total time spent with the patient: 60 minutes.  30 minutes of face to face consultation and 30 minutes of chart review and coordination of care.    Adalid Chamberlain  General Surgery Resident, PGY-3  Pager [phone]    I have personally taken the history and examined this patient and agree with the resident's note as stated above.    Alcira Selby is a 53 y.o. female with hx of R breast DCIS in 2004 s/p lumpectomy and XRT with re-excision of close margins that was negative, s/p completion of tamoxifen 2009. 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 12:00 o'clock region anterior depth there are fine pleomorphic calcifications in a linear distribution measuring 17 mm in extent.     Pt with about a 1.5 cm palpable mass consistent with biopsy  changes in the superior left breast.  Recommend mastectomy given prior hx of XRT.  She is not interested in immediate reconstruction at this point.    She is consented and scheduled for a right total complete mastectomy with right axillary SLNB on 7-17-19 without reconstruction.    I have personally taken the history and examined this patient and agree with the resident's note as stated above.  The patient was seen for goiter today in preparation for her breast cancer surgery pending and coming up on 07/17/2019.  The patient has had a longstanding goiter.  She denies any dysphagia, respiratory, or compressive symptoms.  We performed a TSH and free T4 level today which were within normal limits.    This had also been within normal limits back in 2008.  She has no suspicious thyroid nodules on her thyroid ultrasound so we will leave her thyroid alone at the present time and proceed with her breast cancer surgery as previously outlined on 07/17/2019.  We will obtain another thyroid ultrasound in January of 2020 as part of ongoing screening and surveillance follow-up for her asymptomatic nonfunctioning goiter of her neck without suspicious nodularity.

## 2019-07-13 PROBLEM — E04.9 THYROID GOITER: Status: ACTIVE | Noted: 2019-07-13

## 2019-07-15 DIAGNOSIS — E04.9 THYROID GOITER: Primary | ICD-10-CM

## 2019-07-16 ENCOUNTER — TELEPHONE (OUTPATIENT)
Dept: SURGERY | Facility: CLINIC | Age: 54
End: 2019-07-16

## 2019-07-16 ENCOUNTER — ANESTHESIA EVENT (OUTPATIENT)
Dept: SURGERY | Facility: HOSPITAL | Age: 54
End: 2019-07-16
Payer: COMMERCIAL

## 2019-07-16 NOTE — TELEPHONE ENCOUNTER
Spoke with pt regarding surgery, pt advised to arrive to [facility] at 0945 for 1110 surgery, pt verbalized understanding, pre-op education reinforced, all questions answered at this time, pt given reassurance

## 2019-07-16 NOTE — ANESTHESIA PREPROCEDURE EVALUATION
07/17/2019  Alcira Selby is a 53 y.o., female.  [facility]  Anesthesia Pre-Operative Evaluation         Patient Name: Alcira Selby  YOB: 1965  MRN: [id number]    SUBJECTIVE:     Pre-operative evaluation for Procedure(s) (LRB):  MASTECTOMY RIGHT (Right)  BIOPSY, LYMPH NODE, SENTINEL RIGHT (Right)  LYMPHADENECTOMY, AXILLARY RIGHT (Right)     07/17/2019    Alcira Selby is a 53 y.o. female w/ a significant PMHx of Rt breast DCIS in 2004 s/p lumpectomy and XRT with re-excision of close margins that was negative, s/p completion of tamoxifen 2009. 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      LDA: None documented.    Prev airway: None documented.    Drips: None documented.    Patient Active Problem List   Diagnosis    Acute pain of left knee    Difficulty walking    Malignant neoplasm of right female breast    Pre-op testing    Ductal carcinoma in situ (DCIS) of right breast    At risk for lymphedema    Stiffness due to immobility    Pain of right upper extremity    Thyroid goiter    DCIS (ductal carcinoma in situ) of breast       Review of patient's allergies indicates:   Allergen Reactions    Sulfamethoxazole-trimethoprim Hives and Itching       Current Outpatient Medications:    Current Facility-Administered Medications:     0.9%  NaCl infusion, , Intravenous, Continuous, Adalid Chamberlain MD    ceFAZolin injection 2 g, 2 g, Intravenous, On Call Procedure, Adalid Chamberlain MD    fentaNYL injection 25 mcg, 25 mcg, Intravenous, Q5 Min PRN, Brie Groves MD    midazolam (VERSED) 1 mg/mL injection 0.5 mg, 0.5 mg, Intravenous, PRN, Brie Groves MD    Past Surgical History:   Procedure Laterality Date    FOOT SURGERY Bilateral     Plantar Fascities    HYSTERECTOMY      KNEE  SURGERY Left     Total Knee Replacement    MYOMECTOMY      TONSILLECTOMY         Social History     Socioeconomic History    Marital status:      Spouse name: Not on file    Number of children: Not on file    Years of education: Not on file    Highest education level: Not on file   Occupational History    Not on file   Social Needs    Financial resource strain: Not on file    Food insecurity:     Worry: Not on file     Inability: Not on file    Transportation needs:     Medical: Not on file     Non-medical: Not on file   Tobacco Use    Smoking status: Never Smoker    Smokeless tobacco: Never Used   Substance and Sexual Activity    Alcohol use: No     Frequency: Never    Drug use: No    Sexual activity: Not on file   Lifestyle    Physical activity:     Days per week: Not on file     Minutes per session: Not on file    Stress: Not on file   Relationships    Social connections:     Talks on phone: Not on file     Gets together: Not on file     Attends Tenriism service: Not on file     Active member of club or organization: Not on file     Attends meetings of clubs or organizations: Not on file     Relationship status: Not on file   Other Topics Concern    Not on file   Social History Narrative    Not on file       OBJECTIVE:     Vital Signs Range (Last 24H):  Temp:  [37 °C (98.6 °F)]   Pulse:  [71]   Resp:  [19]   BP: (141)/(93)   SpO2:  [100 %]       Significant Labs:  Lab Results   Component Value Date    WBC 4.50 06/24/2019    HGB 13.0 06/24/2019    HCT 38.9 06/24/2019     06/24/2019    CHOL 190 05/13/2005    TRIG 89 05/13/2005    HDL 52.0 05/13/2005    ALT 15 06/24/2019    AST 13 06/24/2019     06/24/2019    K 3.5 06/24/2019     06/24/2019    CREATININE 0.8 06/24/2019    BUN 12 06/24/2019    CO2 27 06/24/2019    TSH 0.769 07/11/2019       Diagnostic Studies: No relevant studies.    EKG:   Results for orders placed or performed during the hospital encounter of 06/24/19    EKG 12-lead    Collection Time: 06/24/19  9:56 AM    Narrative    Test Reason : Z01.818,    Vent. Rate : 055 BPM     Atrial Rate : 055 BPM     P-R Int : 136 ms          QRS Dur : 078 ms      QT Int : 444 ms       P-R-T Axes : 026 009 -15 degrees     QTc Int : 424 ms    Sinus bradycardia  Nonspecific ST and/or T wave abnormalities  Abnormal ECG  No previous ECGs available  Confirmed by Chapo Croft MD ([id number]) on 6/24/2019 4:08:10 PM    Referred By: JOANA CAZARES           Confirmed By:Chapo Croft MD       2D ECHO:  No results found for this or any previous visit.      ASSESSMENT/PLAN:       Anesthesia Evaluation    I have reviewed the Patient Summary Reports.    I have reviewed the Nursing Notes.   I have reviewed the Medications.     Review of Systems  Anesthesia Hx:  No problems with previous Anesthesia Denies Hx of Anesthetic complications  History of prior surgery of interest to airway management or planning: Previous anesthesia: General Denies Family Hx of Anesthesia complications.   Denies Personal Hx of Anesthesia complications.   Social:  Non-Smoker, No Alcohol Use    Hematology/Oncology:         -- Denies Anemia: Current/Recent Cancer. Breast right   EENT/Dental:EENT/Dental Normal   Cardiovascular:  Cardiovascular Normal Exercise tolerance: good  Denies Hypertension.   Denies Angina. ECG has been reviewed.    Pulmonary:  Pulmonary Normal  Denies Asthma.    Renal/:  Renal/ Normal  Stress incontenance   Hepatic/GI:  Hepatic/GI Normal  Denies GERD.    Musculoskeletal:   Arthritis     Neurological:  Neurology Normal  Denies CVA.    Endocrine:  Endocrine Normal    Psych:  Psychiatric Normal           Physical Exam  General:  Well nourished, Obesity    Airway/Jaw/Neck:  Airway Findings: Mouth Opening: Normal Tongue: Normal  General Airway Assessment: Adult  Mallampati: I  TM Distance: 4 - 6 cm  Jaw/Neck Findings:  Micrognathia: Negative Neck ROM: Normal ROM  Neck Findings:  Abnormal Mass  Mass noted in neck  (thyroid goiter)      Dental:  Dental Findings: In tact   Chest/Lungs:  Chest/Lungs Findings: Clear to auscultation, Normal Respiratory Rate     Heart/Vascular:  Heart Findings: Rate: Normal  Rhythm: Regular Rhythm  Sounds: Normal     Abdomen:  Abdomen Findings:  Normal     Musculoskeletal:  Musculoskeletal Findings:    Skin:  Skin Findings:     Mental Status:  Mental Status Findings:  Alert and Oriented, Cooperative         Anesthesia Plan  Type of Anesthesia, risks & benefits discussed:  Anesthesia Type:  general  Patient's Preference:   Intra-op Monitoring Plan: standard ASA monitors  Intra-op Monitoring Plan Comments:   Post Op Pain Control Plan: multimodal analgesia and IV/PO Opioids PRN  Post Op Pain Control Plan Comments:   Induction:   IV  Beta Blocker:  Patient is not currently on a Beta-Blocker (No further documentation required).       Informed Consent: Patient understands risks and agrees with Anesthesia plan.  Questions answered. Anesthesia consent signed with patient.  ASA Score: 2     Day of Surgery Review of History & Physical:    H&P update referred to the surgeon.     Anesthesia Plan Notes: Plan GETA. PVB for post-op pain control. All questions answered. Informed consent obtained. Pt agrees to proceed.           Ready For Surgery From Anesthesia Perspective.

## 2019-07-17 ENCOUNTER — HOSPITAL ENCOUNTER (OUTPATIENT)
Facility: HOSPITAL | Age: 54
Discharge: HOME | End: 2019-07-19
Attending: SURGERY | Admitting: SURGERY
Payer: COMMERCIAL

## 2019-07-17 ENCOUNTER — HOSPITAL ENCOUNTER (OUTPATIENT)
Dept: RADIOLOGY | Facility: HOSPITAL | Age: 54
Discharge: HOME | End: 2019-07-17
Attending: SURGERY | Admitting: SURGERY
Payer: COMMERCIAL

## 2019-07-17 ENCOUNTER — ANESTHESIA (OUTPATIENT)
Dept: SURGERY | Facility: HOSPITAL | Age: 54
End: 2019-07-17
Payer: COMMERCIAL

## 2019-07-17 DIAGNOSIS — C50.911 MALIGNANT NEOPLASM OF RIGHT FEMALE BREAST, UNSPECIFIED ESTROGEN RECEPTOR STATUS, UNSPECIFIED SITE OF BREAST: ICD-10-CM

## 2019-07-17 DIAGNOSIS — D05.10 DCIS (DUCTAL CARCINOMA IN SITU) OF BREAST: ICD-10-CM

## 2019-07-17 PROCEDURE — 36000707: Performed by: SURGERY

## 2019-07-17 PROCEDURE — 19303 MAST SIMPLE COMPLETE: CPT | Mod: RT,,, | Performed by: SURGERY

## 2019-07-17 PROCEDURE — 88331 PATH CONSLTJ SURG 1 BLK 1SPC: CPT | Performed by: PATHOLOGY

## 2019-07-17 PROCEDURE — 38900 PR INTRAOPERATIVE SENTINEL LYMPH NODE ID W DYE INJECTION: ICD-10-PCS | Mod: RT,,, | Performed by: SURGERY

## 2019-07-17 PROCEDURE — 94761 N-INVAS EAR/PLS OXIMETRY MLT: CPT

## 2019-07-17 PROCEDURE — 63600175 PHARM REV CODE 636 W HCPCS: Performed by: STUDENT IN AN ORGANIZED HEALTH CARE EDUCATION/TRAINING PROGRAM

## 2019-07-17 PROCEDURE — 64461 ERECTOR SPINAE PLANE SINGLE INJECTION BLOCK: ICD-10-PCS | Mod: 59,RT,, | Performed by: ANESTHESIOLOGY

## 2019-07-17 PROCEDURE — 63600175 PHARM REV CODE 636 W HCPCS: Mod: JG | Performed by: SURGERY

## 2019-07-17 PROCEDURE — 01610 ANES ALL PX NRV MUSC SHO&AX: CPT | Performed by: SURGERY

## 2019-07-17 PROCEDURE — 25000003 PHARM REV CODE 250: Performed by: ANESTHESIOLOGY

## 2019-07-17 PROCEDURE — 88342 TISSUE SPECIMEN TO PATHOLOGY - SURGERY: ICD-10-PCS | Mod: 26,,, | Performed by: PATHOLOGY

## 2019-07-17 PROCEDURE — D9220A PRA ANESTHESIA: Mod: ,,, | Performed by: ANESTHESIOLOGY

## 2019-07-17 PROCEDURE — 88342 IMHCHEM/IMCYTCHM 1ST ANTB: CPT | Mod: 26,,, | Performed by: PATHOLOGY

## 2019-07-17 PROCEDURE — 71000033 HC RECOVERY, INTIAL HOUR: Performed by: SURGERY

## 2019-07-17 PROCEDURE — D9220A PRA ANESTHESIA: ICD-10-PCS | Mod: ,,, | Performed by: ANESTHESIOLOGY

## 2019-07-17 PROCEDURE — 38525 PR BIOPSY/REM LYMPH NODES, AXILLARY: ICD-10-PCS | Mod: 51,RT,, | Performed by: SURGERY

## 2019-07-17 PROCEDURE — 27200750 HC INSULATED NEEDLE/ STIMUPLEX: Performed by: ANESTHESIOLOGY

## 2019-07-17 PROCEDURE — 88331 TISSUE SPECIMEN TO PATHOLOGY - SURGERY: ICD-10-PCS | Mod: 26,,, | Performed by: PATHOLOGY

## 2019-07-17 PROCEDURE — 63600175 PHARM REV CODE 636 W HCPCS: Performed by: ANESTHESIOLOGY

## 2019-07-17 PROCEDURE — 76942 ECHO GUIDE FOR BIOPSY: CPT | Performed by: ANESTHESIOLOGY

## 2019-07-17 PROCEDURE — 36000706: Performed by: SURGERY

## 2019-07-17 PROCEDURE — 64461 PVB THORACIC SINGLE INJ SITE: CPT | Mod: 59,RT,, | Performed by: ANESTHESIOLOGY

## 2019-07-17 PROCEDURE — 38792 PR IDENTIFY SENTINEL 2DE: ICD-10-PCS | Mod: 59,RT,, | Performed by: SURGERY

## 2019-07-17 PROCEDURE — 25000003 PHARM REV CODE 250: Performed by: STUDENT IN AN ORGANIZED HEALTH CARE EDUCATION/TRAINING PROGRAM

## 2019-07-17 PROCEDURE — 88331 PATH CONSLTJ SURG 1 BLK 1SPC: CPT | Mod: 26,,, | Performed by: PATHOLOGY

## 2019-07-17 PROCEDURE — 88341 IMHCHEM/IMCYTCHM EA ADD ANTB: CPT | Mod: 26,,, | Performed by: PATHOLOGY

## 2019-07-17 PROCEDURE — 38900 IO MAP OF SENT LYMPH NODE: CPT | Mod: RT,,, | Performed by: SURGERY

## 2019-07-17 PROCEDURE — 88342 IMHCHEM/IMCYTCHM 1ST ANTB: CPT | Performed by: PATHOLOGY

## 2019-07-17 PROCEDURE — 38525 BIOPSY/REMOVAL LYMPH NODES: CPT | Mod: 51,RT,, | Performed by: SURGERY

## 2019-07-17 PROCEDURE — 37000009 HC ANESTHESIA EA ADD 15 MINS: Performed by: SURGERY

## 2019-07-17 PROCEDURE — 71000039 HC RECOVERY, EACH ADD'L HOUR: Performed by: SURGERY

## 2019-07-17 PROCEDURE — 88307 TISSUE SPECIMEN TO PATHOLOGY - SURGERY: ICD-10-PCS | Mod: 26,,, | Performed by: PATHOLOGY

## 2019-07-17 PROCEDURE — 27000221 HC OXYGEN, UP TO 24 HOURS

## 2019-07-17 PROCEDURE — 88341 PR IHC OR ICC EACH ADD'L SINGLE ANTIBODY  STAINPR: ICD-10-PCS | Mod: 26,,, | Performed by: PATHOLOGY

## 2019-07-17 PROCEDURE — 38792 RA TRACER ID OF SENTINL NODE: CPT | Mod: 59,RT,, | Performed by: SURGERY

## 2019-07-17 PROCEDURE — 88307 TISSUE EXAM BY PATHOLOGIST: CPT | Mod: 26,,, | Performed by: PATHOLOGY

## 2019-07-17 PROCEDURE — A9520 TC99 TILMANOCEPT DIAG 0.5MCI: HCPCS

## 2019-07-17 PROCEDURE — 37000008 HC ANESTHESIA 1ST 15 MINUTES: Performed by: SURGERY

## 2019-07-17 PROCEDURE — 19303 PR MASTECTOMY, SIMPLE, COMPLETE: ICD-10-PCS | Mod: RT,,, | Performed by: SURGERY

## 2019-07-17 RX ORDER — SODIUM CHLORIDE 0.9 % (FLUSH) 0.9 %
10 SYRINGE (ML) INJECTION
Status: DISCONTINUED | OUTPATIENT
Start: 2019-07-17 | End: 2019-07-17

## 2019-07-17 RX ORDER — SODIUM CHLORIDE 9 MG/ML
INJECTION, SOLUTION INTRAVENOUS CONTINUOUS
Status: DISCONTINUED | OUTPATIENT
Start: 2019-07-17 | End: 2019-07-17

## 2019-07-17 RX ORDER — OXYCODONE AND ACETAMINOPHEN 10; 325 MG/1; MG/1
TABLET ORAL
Status: DISPENSED
Start: 2019-07-17 | End: 2019-07-18

## 2019-07-17 RX ORDER — ACETAMINOPHEN 325 MG/1
650 TABLET ORAL EVERY 8 HOURS PRN
Status: DISCONTINUED | OUTPATIENT
Start: 2019-07-17 | End: 2019-07-19 | Stop reason: HOSPADM

## 2019-07-17 RX ORDER — DOCUSATE SODIUM 100 MG/1
100 CAPSULE, LIQUID FILLED ORAL 2 TIMES DAILY
Status: DISCONTINUED | OUTPATIENT
Start: 2019-07-17 | End: 2019-07-19 | Stop reason: HOSPADM

## 2019-07-17 RX ORDER — HYDROMORPHONE HYDROCHLORIDE 1 MG/ML
0.5 INJECTION, SOLUTION INTRAMUSCULAR; INTRAVENOUS; SUBCUTANEOUS EVERY 5 MIN PRN
Status: COMPLETED | OUTPATIENT
Start: 2019-07-17 | End: 2019-07-17

## 2019-07-17 RX ORDER — HYDROMORPHONE HYDROCHLORIDE 1 MG/ML
0.5 INJECTION, SOLUTION INTRAMUSCULAR; INTRAVENOUS; SUBCUTANEOUS EVERY 4 HOURS PRN
Status: DISCONTINUED | OUTPATIENT
Start: 2019-07-17 | End: 2019-07-19 | Stop reason: HOSPADM

## 2019-07-17 RX ORDER — BUPIVACAINE HYDROCHLORIDE AND EPINEPHRINE 5; 5 MG/ML; UG/ML
INJECTION, SOLUTION EPIDURAL; INTRACAUDAL; PERINEURAL
Status: COMPLETED | OUTPATIENT
Start: 2019-07-17 | End: 2019-07-17

## 2019-07-17 RX ORDER — CEFAZOLIN SODIUM 1 G/3ML
2 INJECTION, POWDER, FOR SOLUTION INTRAMUSCULAR; INTRAVENOUS
Status: DISCONTINUED | OUTPATIENT
Start: 2019-07-17 | End: 2019-07-17

## 2019-07-17 RX ORDER — OXYCODONE AND ACETAMINOPHEN 10; 325 MG/1; MG/1
1 TABLET ORAL EVERY 4 HOURS PRN
Status: DISCONTINUED | OUTPATIENT
Start: 2019-07-17 | End: 2019-07-19 | Stop reason: HOSPADM

## 2019-07-17 RX ORDER — SODIUM CHLORIDE 9 MG/ML
INJECTION, SOLUTION INTRAVENOUS CONTINUOUS PRN
Status: DISCONTINUED | OUTPATIENT
Start: 2019-07-17 | End: 2019-07-17

## 2019-07-17 RX ORDER — OXYCODONE AND ACETAMINOPHEN 5; 325 MG/1; MG/1
1 TABLET ORAL EVERY 6 HOURS PRN
Qty: 20 TABLET | Refills: 0 | Status: SHIPPED | OUTPATIENT
Start: 2019-07-17 | End: 2019-07-30 | Stop reason: SDUPTHER

## 2019-07-17 RX ORDER — MIDAZOLAM HYDROCHLORIDE 1 MG/ML
0.5 INJECTION INTRAMUSCULAR; INTRAVENOUS
Status: DISCONTINUED | OUTPATIENT
Start: 2019-07-17 | End: 2019-07-17

## 2019-07-17 RX ORDER — GLYCOPYRROLATE 0.2 MG/ML
INJECTION INTRAMUSCULAR; INTRAVENOUS
Status: DISCONTINUED | OUTPATIENT
Start: 2019-07-17 | End: 2019-07-17

## 2019-07-17 RX ORDER — FENTANYL CITRATE 50 UG/ML
INJECTION, SOLUTION INTRAMUSCULAR; INTRAVENOUS
Status: DISCONTINUED | OUTPATIENT
Start: 2019-07-17 | End: 2019-07-17

## 2019-07-17 RX ORDER — BUTALBITAL, ACETAMINOPHEN AND CAFFEINE 50; 325; 40 MG/1; MG/1; MG/1
1 TABLET ORAL EVERY 6 HOURS PRN
Status: DISCONTINUED | OUTPATIENT
Start: 2019-07-17 | End: 2019-07-19 | Stop reason: HOSPADM

## 2019-07-17 RX ORDER — ONDANSETRON 8 MG/1
8 TABLET, ORALLY DISINTEGRATING ORAL EVERY 8 HOURS PRN
Status: DISCONTINUED | OUTPATIENT
Start: 2019-07-17 | End: 2019-07-19 | Stop reason: HOSPADM

## 2019-07-17 RX ORDER — ISOSULFAN BLUE 50 MG/5ML
INJECTION, SOLUTION SUBCUTANEOUS
Status: DISCONTINUED | OUTPATIENT
Start: 2019-07-17 | End: 2019-07-17 | Stop reason: HOSPADM

## 2019-07-17 RX ORDER — KETAMINE HYDROCHLORIDE 10 MG/ML
INJECTION, SOLUTION INTRAMUSCULAR; INTRAVENOUS
Status: DISCONTINUED | OUTPATIENT
Start: 2019-07-17 | End: 2019-07-17

## 2019-07-17 RX ORDER — ENOXAPARIN SODIUM 100 MG/ML
40 INJECTION SUBCUTANEOUS EVERY 24 HOURS
Status: DISCONTINUED | OUTPATIENT
Start: 2019-07-17 | End: 2019-07-19 | Stop reason: HOSPADM

## 2019-07-17 RX ORDER — FENTANYL CITRATE 50 UG/ML
25 INJECTION, SOLUTION INTRAMUSCULAR; INTRAVENOUS EVERY 5 MIN PRN
Status: DISCONTINUED | OUTPATIENT
Start: 2019-07-17 | End: 2019-07-17 | Stop reason: HOSPADM

## 2019-07-17 RX ORDER — ONDANSETRON 2 MG/ML
INJECTION INTRAMUSCULAR; INTRAVENOUS
Status: DISCONTINUED | OUTPATIENT
Start: 2019-07-17 | End: 2019-07-17

## 2019-07-17 RX ORDER — LIDOCAINE HCL/PF 100 MG/5ML
SYRINGE (ML) INTRAVENOUS
Status: DISCONTINUED | OUTPATIENT
Start: 2019-07-17 | End: 2019-07-17

## 2019-07-17 RX ORDER — OXYCODONE AND ACETAMINOPHEN 5; 325 MG/1; MG/1
1 TABLET ORAL EVERY 4 HOURS PRN
Status: DISCONTINUED | OUTPATIENT
Start: 2019-07-17 | End: 2019-07-19 | Stop reason: HOSPADM

## 2019-07-17 RX ORDER — NEOSTIGMINE METHYLSULFATE 1 MG/ML
INJECTION, SOLUTION INTRAVENOUS
Status: DISCONTINUED | OUTPATIENT
Start: 2019-07-17 | End: 2019-07-17

## 2019-07-17 RX ORDER — PROPOFOL 10 MG/ML
VIAL (ML) INTRAVENOUS
Status: DISCONTINUED | OUTPATIENT
Start: 2019-07-17 | End: 2019-07-17

## 2019-07-17 RX ORDER — SODIUM CHLORIDE 0.9 % (FLUSH) 0.9 %
10 SYRINGE (ML) INJECTION
Status: DISCONTINUED | OUTPATIENT
Start: 2019-07-17 | End: 2019-07-19 | Stop reason: HOSPADM

## 2019-07-17 RX ORDER — ROCURONIUM BROMIDE 10 MG/ML
INJECTION, SOLUTION INTRAVENOUS
Status: DISCONTINUED | OUTPATIENT
Start: 2019-07-17 | End: 2019-07-17

## 2019-07-17 RX ORDER — ACETAMINOPHEN 10 MG/ML
INJECTION, SOLUTION INTRAVENOUS
Status: DISCONTINUED | OUTPATIENT
Start: 2019-07-17 | End: 2019-07-17

## 2019-07-17 RX ORDER — DEXAMETHASONE SODIUM PHOSPHATE 4 MG/ML
INJECTION, SOLUTION INTRA-ARTICULAR; INTRALESIONAL; INTRAMUSCULAR; INTRAVENOUS; SOFT TISSUE
Status: DISCONTINUED | OUTPATIENT
Start: 2019-07-17 | End: 2019-07-17

## 2019-07-17 RX ADMIN — OXYCODONE HYDROCHLORIDE AND ACETAMINOPHEN 1 TABLET: 10; 325 TABLET ORAL at 11:07

## 2019-07-17 RX ADMIN — DEXAMETHASONE SODIUM PHOSPHATE 8 MG: 4 INJECTION, SOLUTION INTRAMUSCULAR; INTRAVENOUS at 11:07

## 2019-07-17 RX ADMIN — PROPOFOL 50 MG: 10 INJECTION, EMULSION INTRAVENOUS at 01:07

## 2019-07-17 RX ADMIN — DOCUSATE SODIUM 100 MG: 100 CAPSULE, LIQUID FILLED ORAL at 09:07

## 2019-07-17 RX ADMIN — ONDANSETRON 4 MG: 2 INJECTION INTRAMUSCULAR; INTRAVENOUS at 01:07

## 2019-07-17 RX ADMIN — PROPOFOL 50 MG: 10 INJECTION, EMULSION INTRAVENOUS at 11:07

## 2019-07-17 RX ADMIN — KETAMINE HYDROCHLORIDE 10 MG: 10 INJECTION, SOLUTION INTRAMUSCULAR; INTRAVENOUS at 12:07

## 2019-07-17 RX ADMIN — SODIUM CHLORIDE: 0.9 INJECTION, SOLUTION INTRAVENOUS at 10:07

## 2019-07-17 RX ADMIN — HYDROMORPHONE HYDROCHLORIDE 0.5 MG: 1 INJECTION, SOLUTION INTRAMUSCULAR; INTRAVENOUS; SUBCUTANEOUS at 03:07

## 2019-07-17 RX ADMIN — HYDROMORPHONE HYDROCHLORIDE 0.5 MG: 1 INJECTION, SOLUTION INTRAMUSCULAR; INTRAVENOUS; SUBCUTANEOUS at 09:07

## 2019-07-17 RX ADMIN — SODIUM CHLORIDE 2 G: 9 INJECTION, SOLUTION INTRAVENOUS at 11:07

## 2019-07-17 RX ADMIN — HYDROMORPHONE HYDROCHLORIDE 0.5 MG: 1 INJECTION, SOLUTION INTRAMUSCULAR; INTRAVENOUS; SUBCUTANEOUS at 02:07

## 2019-07-17 RX ADMIN — GLYCOPYRROLATE 0.6 MG: 0.2 INJECTION, SOLUTION INTRAMUSCULAR; INTRAVENOUS at 01:07

## 2019-07-17 RX ADMIN — NEOSTIGMINE METHYLSULFATE 5 MG: 1 INJECTION INTRAVENOUS at 01:07

## 2019-07-17 RX ADMIN — SODIUM CHLORIDE, SODIUM GLUCONATE, SODIUM ACETATE, POTASSIUM CHLORIDE, MAGNESIUM CHLORIDE, SODIUM PHOSPHATE, DIBASIC, AND POTASSIUM PHOSPHATE: .53; .5; .37; .037; .03; .012; .00082 INJECTION, SOLUTION INTRAVENOUS at 12:07

## 2019-07-17 RX ADMIN — ACETAMINOPHEN 1000 MG: 10 INJECTION, SOLUTION INTRAVENOUS at 11:07

## 2019-07-17 RX ADMIN — OXYCODONE HYDROCHLORIDE AND ACETAMINOPHEN 1 TABLET: 10; 325 TABLET ORAL at 02:07

## 2019-07-17 RX ADMIN — ENOXAPARIN SODIUM 40 MG: 100 INJECTION SUBCUTANEOUS at 04:07

## 2019-07-17 RX ADMIN — KETAMINE HYDROCHLORIDE 20 MG: 10 INJECTION, SOLUTION INTRAMUSCULAR; INTRAVENOUS at 11:07

## 2019-07-17 RX ADMIN — OXYCODONE HYDROCHLORIDE AND ACETAMINOPHEN 1 TABLET: 10; 325 TABLET ORAL at 06:07

## 2019-07-17 RX ADMIN — BUPIVACAINE HYDROCHLORIDE AND EPINEPHRINE BITARTRATE 30 ML: 5; .0091 INJECTION, SOLUTION EPIDURAL; INTRACAUDAL; PERINEURAL at 10:07

## 2019-07-17 RX ADMIN — ROCURONIUM BROMIDE 20 MG: 10 INJECTION, SOLUTION INTRAVENOUS at 11:07

## 2019-07-17 RX ADMIN — ROCURONIUM BROMIDE 40 MG: 10 INJECTION, SOLUTION INTRAVENOUS at 11:07

## 2019-07-17 RX ADMIN — MIDAZOLAM HYDROCHLORIDE 2 MG: 1 INJECTION, SOLUTION INTRAMUSCULAR; INTRAVENOUS at 10:07

## 2019-07-17 RX ADMIN — PROPOFOL 150 MG: 10 INJECTION, EMULSION INTRAVENOUS at 11:07

## 2019-07-17 RX ADMIN — LIDOCAINE HYDROCHLORIDE 40 MG: 20 INJECTION, SOLUTION INTRAVENOUS at 11:07

## 2019-07-17 RX ADMIN — FENTANYL CITRATE 100 MCG: 50 INJECTION, SOLUTION INTRAMUSCULAR; INTRAVENOUS at 11:07

## 2019-07-17 RX ADMIN — HYDROMORPHONE HYDROCHLORIDE 0.5 MG: 1 INJECTION, SOLUTION INTRAMUSCULAR; INTRAVENOUS; SUBCUTANEOUS at 04:07

## 2019-07-17 NOTE — ANESTHESIA POSTPROCEDURE EVALUATION
Anesthesia Post Evaluation    Patient: Alcira Selby    Procedure(s) Performed: Procedure(s) (LRB):  MASTECTOMY RIGHT (Right)  BIOPSY, LYMPH NODE, SENTINEL RIGHT (Right)  LYMPHADENECTOMY, AXILLARY RIGHT (Right)    Final Anesthesia Type: general  Patient location during evaluation: PACU  Patient participation: Yes- Able to Participate  Level of consciousness: awake and alert  Post-procedure vital signs: reviewed and stable  Pain management: adequate  Airway patency: patent  PONV status at discharge: No PONV  Anesthetic complications: no      Cardiovascular status: blood pressure returned to baseline and hemodynamically stable  Respiratory status: spontaneous ventilation, unassisted and room air  Follow-up not needed.          Vitals Value Taken Time   /91 7/17/2019  4:16 PM   Temp 36.5 °C (97.7 °F) 7/17/2019  3:15 PM   Pulse 74 7/17/2019  4:30 PM   Resp 19 7/17/2019  4:30 PM   SpO2 100 % 7/17/2019  4:30 PM   Vitals shown include unvalidated device data.      No case tracking events are documented in the log.      Pain/Alton Score: Pain Rating Prior to Med Admin: 8 (7/17/2019  3:03 PM)  Pain Rating Post Med Admin: 8 (7/17/2019  3:00 PM)  Alton Score: 9 (7/17/2019  3:00 PM)

## 2019-07-17 NOTE — INTERVAL H&P NOTE
The patient has been examined and the H&P has been reviewed:    I concur with the findings and no changes have occurred since H&P was written.    Anesthesia/Surgery risks, benefits and alternative options discussed and understood by patient/family.          Active Hospital Problems    Diagnosis  POA    DCIS (ductal carcinoma in situ) of breast [D05.10]  Yes      Resolved Hospital Problems   No resolved problems to display.

## 2019-07-17 NOTE — BRIEF OP NOTE
[facility]  Brief Operative Note     SUMMARY     Surgery Date: 7/17/2019     Surgeon(s) and Role:     * Darin Vaughan MD - Primary     * Adalid Chamberlain MD - Resident - Assisting        Pre-op Diagnosis:  Malignant neoplasm of right female breast, unspecified estrogen receptor status, unspecified site of breast [C50.911]    Post-op Diagnosis:  Post-Op Diagnosis Codes:     * Malignant neoplasm of right female breast, unspecified estrogen receptor status, unspecified site of breast [C50.911]    Procedure(s) (LRB):  MASTECTOMY RIGHT (Right)  BIOPSY, LYMPH NODE, SENTINEL RIGHT (Right)  LYMPHADENECTOMY, AXILLARY RIGHT (Right)    Anesthesia: General    Description of the findings of the procedure: Right mastectomy with SLNB, SLN hot and blue, 1000. Frozen negative for malignancy.    Findings/Key Components: Same.    Estimated Blood Loss: 30 mL         Specimens:   Specimen (12h ago, onward)    Start     Ordered    07/17/19 1232  Specimen to Pathology - Surgery  Once     Comments:  Pre-op Diagnosis: Malignant neoplasm of right female breast, unspecified estrogen receptor status, unspecified site of breast [C50.911]Procedure(s):MASTECTOMY RIGHTBIOPSY, LYMPH NODE, SENTINEL RIGHTLYMPHADENECTOMY, AXILLARY RIGHT Number of specimens: 1Name of specimens: 1) Right mastectomy, short superior long lateral     Start Status     07/17/19 1232 Collected (07/17/19 1238) Order ID: [id number]       07/17/19 1238        Dispo: Extubated to PACU in stable condition

## 2019-07-17 NOTE — TRANSFER OF CARE
"Anesthesia Transfer of Care Note    Patient: Alcira Selby    Procedure(s) Performed: Procedure(s) (LRB):  MASTECTOMY RIGHT (Right)  BIOPSY, LYMPH NODE, SENTINEL RIGHT (Right)  LYMPHADENECTOMY, AXILLARY RIGHT (Right)    Patient location: PACU    Anesthesia Type: general    Transport from OR: Transported from OR on 6-10 L/min O2 by face mask with adequate spontaneous ventilation    Post pain: adequate analgesia    Post assessment: no apparent anesthetic complications    Level of consciousness: awake    Nausea/Vomiting: no nausea/vomiting    Complications: none    Transfer of care protocol was followed      Last vitals:   Visit Vitals  BP (!) 144/80 (BP Location: Left arm, Patient Position: Lying)   Pulse 81   Temp 37 °C (98.6 °F) (Oral)   Resp 20   Ht 5' 8" (1.727 m)   Wt 104.8 kg (231 lb)   SpO2 100%   Breastfeeding? No   BMI 35.12 kg/m²     "

## 2019-07-17 NOTE — NURSING TRANSFER
Nursing Transfer Note      7/17/2019     Transfer To: 806    Transfer via stretcher    Transfer with na    Transported by pct    Medicines sent: none    Chart send with patient: Yes    Notified: spouse    Patient reassessed at: 7/17/19 7812

## 2019-07-17 NOTE — ANESTHESIA PROCEDURE NOTES
Erector Spinae Plane Single Injection Block    Patient location during procedure: pre-op   Block not for primary anesthetic.  Reason for block: at surgeon's request and post-op pain management   Post-op Pain Location: Right chest wall pain  Start time: 7/17/2019 10:45 AM  Timeout: 7/17/2019 10:39 AM   End time: 7/17/2019 10:47 AM    Staffing  Authorizing Provider: Doron Coronado MD  Performing Provider: Doron Coronado MD    Preanesthetic Checklist  Completed: patient identified, site marked, surgical consent, pre-op evaluation, timeout performed, IV checked, risks and benefits discussed and monitors and equipment checked  Peripheral Block  Patient position: sitting  Prep: ChloraPrep  Patient monitoring: heart rate, cardiac monitor, continuous pulse ox, continuous capnometry and frequent blood pressure checks  Block type: erector spinae plane (Erector Spinae Plane Block)  Laterality: right  Injection technique: single shot  Location: T3-4  Needle  Needle type: Stimuplex   Needle gauge: 21 G  Needle length: 4 in  Needle localization: anatomical landmarks and ultrasound guidance   -ultrasound image captured on disc.  Assessment  Injection assessment: negative aspiration, negative parasthesia and local visualized surrounding nerve  Paresthesia pain: none  Heart rate change: no  Slow fractionated injection: yes  Additional Notes  Patient tolerated well.  See [facility] RN record for vitals.

## 2019-07-18 LAB
ANION GAP SERPL CALC-SCNC: 8 MMOL/L (ref 8–16)
BASOPHILS # BLD AUTO: 0.03 K/UL (ref 0–0.2)
BASOPHILS NFR BLD: 0.4 % (ref 0–1.9)
BUN SERPL-MCNC: 13 MG/DL (ref 6–20)
CALCIUM SERPL-MCNC: 8.6 MG/DL (ref 8.7–10.5)
CHLORIDE SERPL-SCNC: 104 MMOL/L (ref 95–110)
CO2 SERPL-SCNC: 26 MMOL/L (ref 23–29)
CREAT SERPL-MCNC: 0.7 MG/DL (ref 0.5–1.4)
DIFFERENTIAL METHOD: ABNORMAL
EOSINOPHIL # BLD AUTO: 0 K/UL (ref 0–0.5)
EOSINOPHIL NFR BLD: 0.1 % (ref 0–8)
ERYTHROCYTE [DISTWIDTH] IN BLOOD BY AUTOMATED COUNT: 14.4 % (ref 11.5–14.5)
EST. GFR  (AFRICAN AMERICAN): >60 ML/MIN/1.73 M^2
EST. GFR  (NON AFRICAN AMERICAN): >60 ML/MIN/1.73 M^2
GLUCOSE SERPL-MCNC: 112 MG/DL (ref 70–110)
HCT VFR BLD AUTO: 35.9 % (ref 37–48.5)
HGB BLD-MCNC: 11.8 G/DL (ref 12–16)
IMM GRANULOCYTES # BLD AUTO: 0.03 K/UL (ref 0–0.04)
IMM GRANULOCYTES NFR BLD AUTO: 0.4 % (ref 0–0.5)
LYMPHOCYTES # BLD AUTO: 1.9 K/UL (ref 1–4.8)
LYMPHOCYTES NFR BLD: 24 % (ref 18–48)
MAGNESIUM SERPL-MCNC: 2 MG/DL (ref 1.6–2.6)
MCH RBC QN AUTO: 29.9 PG (ref 27–31)
MCHC RBC AUTO-ENTMCNC: 32.9 G/DL (ref 32–36)
MCV RBC AUTO: 91 FL (ref 82–98)
MONOCYTES # BLD AUTO: 0.5 K/UL (ref 0.3–1)
MONOCYTES NFR BLD: 6.5 % (ref 4–15)
NEUTROPHILS # BLD AUTO: 5.5 K/UL (ref 1.8–7.7)
NEUTROPHILS NFR BLD: 68.6 % (ref 38–73)
NRBC BLD-RTO: 0 /100 WBC
PLATELET # BLD AUTO: 247 K/UL (ref 150–350)
PMV BLD AUTO: 10.7 FL (ref 9.2–12.9)
POTASSIUM SERPL-SCNC: 4 MMOL/L (ref 3.5–5.1)
RBC # BLD AUTO: 3.95 M/UL (ref 4–5.4)
SODIUM SERPL-SCNC: 138 MMOL/L (ref 136–145)
WBC # BLD AUTO: 7.96 K/UL (ref 3.9–12.7)

## 2019-07-18 PROCEDURE — 94761 N-INVAS EAR/PLS OXIMETRY MLT: CPT

## 2019-07-18 PROCEDURE — 99900035 HC TECH TIME PER 15 MIN (STAT)

## 2019-07-18 PROCEDURE — 25000003 PHARM REV CODE 250: Performed by: STUDENT IN AN ORGANIZED HEALTH CARE EDUCATION/TRAINING PROGRAM

## 2019-07-18 PROCEDURE — G0378 HOSPITAL OBSERVATION PER HR: HCPCS

## 2019-07-18 PROCEDURE — 83735 ASSAY OF MAGNESIUM: CPT

## 2019-07-18 PROCEDURE — 80048 BASIC METABOLIC PNL TOTAL CA: CPT

## 2019-07-18 PROCEDURE — 94799 UNLISTED PULMONARY SVC/PX: CPT

## 2019-07-18 PROCEDURE — 63600175 PHARM REV CODE 636 W HCPCS: Performed by: STUDENT IN AN ORGANIZED HEALTH CARE EDUCATION/TRAINING PROGRAM

## 2019-07-18 PROCEDURE — 85025 COMPLETE CBC W/AUTO DIFF WBC: CPT

## 2019-07-18 PROCEDURE — 36415 COLL VENOUS BLD VENIPUNCTURE: CPT

## 2019-07-18 RX ORDER — DIPHENHYDRAMINE HCL 25 MG
25 CAPSULE ORAL EVERY 6 HOURS PRN
Status: DISCONTINUED | OUTPATIENT
Start: 2019-07-18 | End: 2019-07-19 | Stop reason: HOSPADM

## 2019-07-18 RX ADMIN — OXYCODONE HYDROCHLORIDE AND ACETAMINOPHEN 1 TABLET: 10; 325 TABLET ORAL at 06:07

## 2019-07-18 RX ADMIN — ENOXAPARIN SODIUM 40 MG: 100 INJECTION SUBCUTANEOUS at 06:07

## 2019-07-18 RX ADMIN — HYDROMORPHONE HYDROCHLORIDE 0.5 MG: 1 INJECTION, SOLUTION INTRAMUSCULAR; INTRAVENOUS; SUBCUTANEOUS at 06:07

## 2019-07-18 RX ADMIN — HYDROMORPHONE HYDROCHLORIDE 0.5 MG: 1 INJECTION, SOLUTION INTRAMUSCULAR; INTRAVENOUS; SUBCUTANEOUS at 11:07

## 2019-07-18 RX ADMIN — HYDROMORPHONE HYDROCHLORIDE 0.5 MG: 1 INJECTION, SOLUTION INTRAMUSCULAR; INTRAVENOUS; SUBCUTANEOUS at 01:07

## 2019-07-18 RX ADMIN — DOCUSATE SODIUM 100 MG: 100 CAPSULE, LIQUID FILLED ORAL at 09:07

## 2019-07-18 RX ADMIN — OXYCODONE HYDROCHLORIDE AND ACETAMINOPHEN 1 TABLET: 10; 325 TABLET ORAL at 04:07

## 2019-07-18 RX ADMIN — OXYCODONE HYDROCHLORIDE AND ACETAMINOPHEN 1 TABLET: 10; 325 TABLET ORAL at 02:07

## 2019-07-18 RX ADMIN — OXYCODONE HYDROCHLORIDE AND ACETAMINOPHEN 1 TABLET: 10; 325 TABLET ORAL at 09:07

## 2019-07-18 RX ADMIN — ONDANSETRON 8 MG: 8 TABLET, ORALLY DISINTEGRATING ORAL at 09:07

## 2019-07-18 RX ADMIN — DIPHENHYDRAMINE HYDROCHLORIDE 25 MG: 25 CAPSULE ORAL at 09:07

## 2019-07-18 RX ADMIN — HYDROMORPHONE HYDROCHLORIDE 0.5 MG: 1 INJECTION, SOLUTION INTRAMUSCULAR; INTRAVENOUS; SUBCUTANEOUS at 09:07

## 2019-07-18 RX ADMIN — HYDROMORPHONE HYDROCHLORIDE 0.5 MG: 1 INJECTION, SOLUTION INTRAMUSCULAR; INTRAVENOUS; SUBCUTANEOUS at 03:07

## 2019-07-18 NOTE — NURSING
Pt admitted to 8th floor oncology prior to this RN's shift. RN performed assessment and oriented patient to room at start of shift. Pt is A & O x4, VSS, afebrile. Pt has two LINDA drains with moderate bloody drainage. Pt in non-skid footwear, bed locked, in lowest position; call bell in reach. Will continue to monitor.

## 2019-07-18 NOTE — PLAN OF CARE
Problem: Adult Inpatient Plan of Care  Goal: Plan of Care Review  Outcome: Ongoing (interventions implemented as appropriate)  Patient remained AAOx3 throughout the shift. Assessment completed & no alarming findings found.VS remained stable. All schedules/PRN medications administered as ordered. Tolerating a regular diet without any difficulty. Activity done with standby assist. Voids per toilet;adequate output noted. Side rails up x2; call bell in place; bed in lowest, locked position; skid proof socks on; no evidence of skin breakdown; care plan explained to patient; no additional complaints at this time. Will continue routine plan of care.

## 2019-07-18 NOTE — ASSESSMENT & PLAN NOTE
POD 1 from right mastectomy for DCIS    Regular diet  PRN pain control  LINDA drain teaching  Ambulate  CBC in AM

## 2019-07-18 NOTE — PROGRESS NOTES
[facility]  General Surgery  Progress Note    Subjective:     History of Present Illness:  No notes on file    Post-Op Info:  Procedure(s) (LRB):  MASTECTOMY RIGHT (Right)  BIOPSY, LYMPH NODE, SENTINEL RIGHT (Right)  LYMPHADENECTOMY, AXILLARY RIGHT (Right)   1 Day Post-Op     Interval History: NAEON, have some issues with pain. Tolerating diet, ambulating, voiding.    Medications:  Continuous Infusions:  Scheduled Meds:   docusate sodium  100 mg Oral BID    enoxaparin  40 mg Subcutaneous Daily     PRN Meds:acetaminophen, butalbital-acetaminophen-caffeine -40 mg, diphenhydrAMINE, HYDROmorphone, ondansetron, oxyCODONE-acetaminophen, oxyCODONE-acetaminophen, sodium chloride 0.9%     Review of patient's allergies indicates:   Allergen Reactions    Sulfamethoxazole-trimethoprim Hives and Itching     Objective:     Vital Signs (Most Recent):  Temp: 97 °F (36.1 °C) (07/18/19 0728)  Pulse: (!) 57 (07/18/19 0728)  Resp: 17 (07/18/19 0728)  BP: (!) 102/58 (07/18/19 0728)  SpO2: 98 % (07/18/19 0728) Vital Signs (24h Range):  Temp:  [96 °F (35.6 °C)-98.6 °F (37 °C)] 97 °F (36.1 °C)  Pulse:  [56-81] 57  Resp:  [11-24] 17  SpO2:  [95 %-100 %] 98 %  BP: (102-173)/() 102/58     Weight: 104.8 kg (231 lb)  Body mass index is 35.12 kg/m².    Intake/Output - Last 3 Shifts       07/16 0700 - 07/17 0659 07/17 0700 - 07/18 0659 07/18 0700 - 07/19 0659    P.O.  1290     I.V. (mL/kg)  1000 (9.5)     Total Intake(mL/kg)  2290 (21.9)     Urine (mL/kg/hr)  700     Drains  535 20    Total Output  1235 20    Net  +1055 -20           Urine Occurrence  1 x           Physical Exam   Constitutional: She is oriented to person, place, and time. She appears well-developed and well-nourished. No distress.   HENT:   Head: Normocephalic and atraumatic.   Eyes: Pupils are equal, round, and reactive to light. EOM are normal.   Neck: Normal range of motion. Neck supple.   Cardiovascular: Normal rate and regular rhythm.    Pulmonary/Chest: Effort normal. No respiratory distress.       Abdominal: Soft. She exhibits no distension.   Musculoskeletal: Normal range of motion.   Neurological: She is alert and oriented to person, place, and time.   Skin: Skin is warm and dry. She is not diaphoretic.   Psychiatric: She has a normal mood and affect. Her behavior is normal. Judgment and thought content normal.   Nursing note and vitals reviewed.      Significant Labs:  CBC:   Recent Labs   Lab 07/18/19  0349   WBC 7.96   RBC 3.95*   HGB 11.8*   HCT 35.9*      MCV 91   MCH 29.9   MCHC 32.9     BMP:   Recent Labs   Lab 07/18/19  0349   *      K 4.0      CO2 26   BUN 13   CREATININE 0.7   CALCIUM 8.6*   MG 2.0       Significant Diagnostics:  I have reviewed all pertinent imaging results/findings within the past 24 hours.    Assessment/Plan:     * DCIS (ductal carcinoma in situ) of breast  POD 1 from right mastectomy for DCIS    Regular diet  PRN pain control  LINDA drain teaching  Ambulate  CBC in AM        Adalid Chamberlain MD  General Surgery  [facility]

## 2019-07-18 NOTE — SUBJECTIVE & OBJECTIVE
Interval History: NAEON, have some issues with pain. Tolerating diet, ambulating, voiding.    Medications:  Continuous Infusions:  Scheduled Meds:   docusate sodium  100 mg Oral BID    enoxaparin  40 mg Subcutaneous Daily     PRN Meds:acetaminophen, butalbital-acetaminophen-caffeine -40 mg, diphenhydrAMINE, HYDROmorphone, ondansetron, oxyCODONE-acetaminophen, oxyCODONE-acetaminophen, sodium chloride 0.9%     Review of patient's allergies indicates:   Allergen Reactions    Sulfamethoxazole-trimethoprim Hives and Itching     Objective:     Vital Signs (Most Recent):  Temp: 97 °F (36.1 °C) (07/18/19 0728)  Pulse: (!) 57 (07/18/19 0728)  Resp: 17 (07/18/19 0728)  BP: (!) 102/58 (07/18/19 0728)  SpO2: 98 % (07/18/19 0728) Vital Signs (24h Range):  Temp:  [96 °F (35.6 °C)-98.6 °F (37 °C)] 97 °F (36.1 °C)  Pulse:  [56-81] 57  Resp:  [11-24] 17  SpO2:  [95 %-100 %] 98 %  BP: (102-173)/() 102/58     Weight: 104.8 kg (231 lb)  Body mass index is 35.12 kg/m².    Intake/Output - Last 3 Shifts       07/16 0700 - 07/17 0659 07/17 0700 - 07/18 0659 07/18 0700 - 07/19 0659    P.O.  1290     I.V. (mL/kg)  1000 (9.5)     Total Intake(mL/kg)  2290 (21.9)     Urine (mL/kg/hr)  700     Drains  535 20    Total Output  1235 20    Net  +1055 -20           Urine Occurrence  1 x           Physical Exam   Constitutional: She is oriented to person, place, and time. She appears well-developed and well-nourished. No distress.   HENT:   Head: Normocephalic and atraumatic.   Eyes: Pupils are equal, round, and reactive to light. EOM are normal.   Neck: Normal range of motion. Neck supple.   Cardiovascular: Normal rate and regular rhythm.   Pulmonary/Chest: Effort normal. No respiratory distress.       Abdominal: Soft. She exhibits no distension.   Musculoskeletal: Normal range of motion.   Neurological: She is alert and oriented to person, place, and time.   Skin: Skin is warm and dry. She is not diaphoretic.   Psychiatric: She has a  normal mood and affect. Her behavior is normal. Judgment and thought content normal.   Nursing note and vitals reviewed.      Significant Labs:  CBC:   Recent Labs   Lab 07/18/19  0349   WBC 7.96   RBC 3.95*   HGB 11.8*   HCT 35.9*      MCV 91   MCH 29.9   MCHC 32.9     BMP:   Recent Labs   Lab 07/18/19  0349   *      K 4.0      CO2 26   BUN 13   CREATININE 0.7   CALCIUM 8.6*   MG 2.0       Significant Diagnostics:  I have reviewed all pertinent imaging results/findings within the past 24 hours.

## 2019-07-19 VITALS
WEIGHT: 231 LBS | BODY MASS INDEX: 35.01 KG/M2 | DIASTOLIC BLOOD PRESSURE: 63 MMHG | HEIGHT: 68 IN | OXYGEN SATURATION: 93 % | HEART RATE: 63 BPM | TEMPERATURE: 99 F | SYSTOLIC BLOOD PRESSURE: 113 MMHG | RESPIRATION RATE: 16 BRPM

## 2019-07-19 LAB
BASOPHILS # BLD AUTO: 0.03 K/UL (ref 0–0.2)
BASOPHILS NFR BLD: 0.5 % (ref 0–1.9)
DIFFERENTIAL METHOD: ABNORMAL
EOSINOPHIL # BLD AUTO: 0.1 K/UL (ref 0–0.5)
EOSINOPHIL NFR BLD: 1.4 % (ref 0–8)
ERYTHROCYTE [DISTWIDTH] IN BLOOD BY AUTOMATED COUNT: 14.3 % (ref 11.5–14.5)
HCT VFR BLD AUTO: 33.3 % (ref 37–48.5)
HGB BLD-MCNC: 10.7 G/DL (ref 12–16)
IMM GRANULOCYTES # BLD AUTO: 0.01 K/UL (ref 0–0.04)
IMM GRANULOCYTES NFR BLD AUTO: 0.2 % (ref 0–0.5)
INR PPP: 0.9 (ref 0.8–1.2)
LYMPHOCYTES # BLD AUTO: 2.7 K/UL (ref 1–4.8)
LYMPHOCYTES NFR BLD: 47.1 % (ref 18–48)
MCH RBC QN AUTO: 30 PG (ref 27–31)
MCHC RBC AUTO-ENTMCNC: 32.1 G/DL (ref 32–36)
MCV RBC AUTO: 93 FL (ref 82–98)
MONOCYTES # BLD AUTO: 0.4 K/UL (ref 0.3–1)
MONOCYTES NFR BLD: 6.7 % (ref 4–15)
NEUTROPHILS # BLD AUTO: 2.5 K/UL (ref 1.8–7.7)
NEUTROPHILS NFR BLD: 44.1 % (ref 38–73)
NRBC BLD-RTO: 0 /100 WBC
PLATELET # BLD AUTO: 213 K/UL (ref 150–350)
PMV BLD AUTO: 10.6 FL (ref 9.2–12.9)
PROTHROMBIN TIME: 9.7 SEC (ref 9–12.5)
RBC # BLD AUTO: 3.57 M/UL (ref 4–5.4)
WBC # BLD AUTO: 5.69 K/UL (ref 3.9–12.7)

## 2019-07-19 PROCEDURE — G0378 HOSPITAL OBSERVATION PER HR: HCPCS

## 2019-07-19 PROCEDURE — 36415 COLL VENOUS BLD VENIPUNCTURE: CPT

## 2019-07-19 PROCEDURE — 85610 PROTHROMBIN TIME: CPT

## 2019-07-19 PROCEDURE — 25000003 PHARM REV CODE 250: Performed by: STUDENT IN AN ORGANIZED HEALTH CARE EDUCATION/TRAINING PROGRAM

## 2019-07-19 PROCEDURE — 85025 COMPLETE CBC W/AUTO DIFF WBC: CPT

## 2019-07-19 PROCEDURE — 63600175 PHARM REV CODE 636 W HCPCS: Performed by: STUDENT IN AN ORGANIZED HEALTH CARE EDUCATION/TRAINING PROGRAM

## 2019-07-19 RX ORDER — ONDANSETRON 4 MG/1
4 TABLET, FILM COATED ORAL EVERY 8 HOURS PRN
Qty: 10 TABLET | Refills: 3 | Status: SHIPPED | OUTPATIENT
Start: 2019-07-19

## 2019-07-19 RX ADMIN — OXYCODONE HYDROCHLORIDE AND ACETAMINOPHEN 1 TABLET: 10; 325 TABLET ORAL at 12:07

## 2019-07-19 RX ADMIN — OXYCODONE HYDROCHLORIDE AND ACETAMINOPHEN 1 TABLET: 10; 325 TABLET ORAL at 10:07

## 2019-07-19 RX ADMIN — HYDROMORPHONE HYDROCHLORIDE 0.5 MG: 1 INJECTION, SOLUTION INTRAMUSCULAR; INTRAVENOUS; SUBCUTANEOUS at 07:07

## 2019-07-19 RX ADMIN — DOCUSATE SODIUM 100 MG: 100 CAPSULE, LIQUID FILLED ORAL at 10:07

## 2019-07-19 RX ADMIN — OXYCODONE HYDROCHLORIDE AND ACETAMINOPHEN 1 TABLET: 10; 325 TABLET ORAL at 05:07

## 2019-07-19 NOTE — PLAN OF CARE
Problem: Adult Inpatient Plan of Care  Goal: Plan of Care Review  Plan of care reviewed with pt. Pt is A & O x4, VSS, afebrile; expecting discharge today. Pt's pain is under better control with percocet and dilaudid given PRN. No major complaints overnight. Pt's drainage from JPs has decreased. Pt in non-skid footwear, bed locked, in lowest position; call bell in reach. Will continue to monitor.

## 2019-07-19 NOTE — NURSING
Discharge instructions given and  AVS reviewed and prescription given to patient . Extra gauze and padding given to patient for support bra . PIV removed and wrapped.  Patient escorted out by  wheel chair and daughter .

## 2019-07-19 NOTE — DISCHARGE SUMMARY
[facility]  General Surgery  Discharge Summary      Patient Name: Alcira Selby  MRN: [id number]  Admission Date: 7/17/2019  Hospital Length of Stay: 0 days  Discharge Date and Time:  07/19/2019 7:12 AM  Attending Physician: Darin Vaughan MD   Discharging Provider: Adalid Chamberlain MD  Primary Care Provider: Primary Doctor No     HPI: Alcira Selby is a 53 y.o. female with hx of R breast DCIS in 2004 s/p lumpectomy and XRT with re-excision of close margins that was negative, s/p completion of tamoxifen 2009. She presents with R breast DCIS, ER/PA +, nuclear grade III. 12:00 o'clock region anterior depth there are fine pleomorphic calcifications in a linear distribution measuring 17 mm in extent.      She endorses chronic right breast pain described as daily stinging x several minutes.   Patient does routinely do self breast exams.  Patient has not noted a change on breast exam.  Patient denies nipple discharge. Patient admits to to previous breast biopsy. Patient admits to a personal history of breast cancer.     Findings at that time were the following:   Tumor size: 17 mm   Tumor grade: high grade  Estrogen Receptor: +  Progesterone Receptor: +      In the right breast, 12:00 o'clock region anterior depth there are fine pleomorphic calcifications in a linear distribution measuring 17 mm in extent.     Procedure(s) (LRB):  MASTECTOMY RIGHT (Right)  BIOPSY, LYMPH NODE, SENTINEL RIGHT (Right)  LYMPHADENECTOMY, AXILLARY RIGHT (Right)     Hospital Course: Patient tolerated procedure well. POD 1 she was having pain control issues and was having high bloody drain output so she was kept for a second night. POD2 her pain was well controlled, she was ambulating, tolerating PO, , voiding, and her drain output had decreased significantly. She was discharged with LINDA drain care instructions and will follow up in 2 weeks with Dr. Vaughan.    Physical Exam   Constitutional: She is oriented to  person, place, and time. She appears well-developed and well-nourished. No distress.   HENT:   Head: Normocephalic and atraumatic.   Eyes: Pupils are equal, round, and reactive to light. EOM are normal.   Neck: Normal range of motion. Neck supple.   Cardiovascular: Normal rate and regular rhythm.   Pulmonary/Chest: Effort normal. No respiratory distress.       Abdominal: Soft. She exhibits no distension.   Musculoskeletal: Normal range of motion.   Neurological: She is alert and oriented to person, place, and time.   Skin: Skin is warm and dry. She is not diaphoretic.   Psychiatric: She has a normal mood and affect. Her behavior is normal. Judgment and thought content normal.   Nursing note and vitals reviewed.        Consults:     Significant Diagnostic Studies: Labs:   BMP:   Recent Labs   Lab 07/18/19  0349   *      K 4.0      CO2 26   BUN 13   CREATININE 0.7   CALCIUM 8.6*   MG 2.0    and CBC   Recent Labs   Lab 07/18/19  0349 07/19/19  0403   WBC 7.96 5.69   HGB 11.8* 10.7*   HCT 35.9* 33.3*    213       Pending Diagnostic Studies:     None        Final Active Diagnoses:    Diagnosis Date Noted POA    PRINCIPAL PROBLEM:  DCIS (ductal carcinoma in situ) of breast [D05.10] 07/17/2019 Yes      Problems Resolved During this Admission:      Discharged Condition: good    Disposition:     Follow Up:    Patient Instructions:   No discharge procedures on file.  Medications:  Reconciled Home Medications:      Medication List      START taking these medications    ondansetron 4 MG tablet  Commonly known as:  ZOFRAN  Take 1 tablet (4 mg total) by mouth every 8 (eight) hours as needed for Nausea.     oxyCODONE-acetaminophen 5-325 mg per tablet  Commonly known as:  PERCOCET  Take 1 tablet by mouth every 6 (six) hours as needed for Pain.        CONTINUE taking these medications    butalbital-acetaminophen-caff -40 mg -40 mg Cap  Take by mouth once daily.     docusate sodium 100 MG  capsule  Commonly known as:  COLACE  Take 100 mg by mouth 2 (two) times daily.     fluticasone propionate 50 mcg/actuation nasal spray  Commonly known as:  FLONASE  1 spray by Each Nare route once daily.     meloxicam 15 MG tablet  Commonly known as:  MOBIC  Take 15 mg by mouth.     oxybutynin 10 MG 24 hr tablet  Commonly known as:  DITROPAN-XL  TAKE 1 TABLET BY MOUTH ONCE DAILY     predniSONE 10 MG tablet  Commonly known as:  DELTASONE  TK 1 T PO  BID     SUPER B COMPLEX-B-12 ORAL  Take by mouth.     triamcinolone acetonide 0.1% 0.1 % cream  Commonly known as:  KENALOG  APPLY AA BID PRN     VITAMIN B-12 100 MCG tablet  Generic drug:  cyanocobalamin  Take by mouth.            Adalid Chamberlain MD  General Surgery  [facility]

## 2019-07-25 ENCOUNTER — OFFICE VISIT (OUTPATIENT)
Dept: SURGERY | Facility: CLINIC | Age: 54
End: 2019-07-25
Payer: COMMERCIAL

## 2019-07-25 VITALS
SYSTOLIC BLOOD PRESSURE: 162 MMHG | TEMPERATURE: 99 F | WEIGHT: 231.06 LBS | HEIGHT: 68 IN | DIASTOLIC BLOOD PRESSURE: 94 MMHG | BODY MASS INDEX: 35.02 KG/M2 | HEART RATE: 69 BPM

## 2019-07-25 DIAGNOSIS — C50.911 MALIGNANT NEOPLASM OF RIGHT FEMALE BREAST, UNSPECIFIED ESTROGEN RECEPTOR STATUS, UNSPECIFIED SITE OF BREAST: Primary | ICD-10-CM

## 2019-07-25 DIAGNOSIS — D05.11 DUCTAL CARCINOMA IN SITU (DCIS) OF RIGHT BREAST: ICD-10-CM

## 2019-07-25 PROCEDURE — 99024 POSTOP FOLLOW-UP VISIT: CPT | Mod: S$GLB,,, | Performed by: SURGERY

## 2019-07-25 PROCEDURE — 99999 PR PBB SHADOW E&M-EST. PATIENT-LVL III: ICD-10-PCS | Mod: PBBFAC,,, | Performed by: SURGERY

## 2019-07-25 PROCEDURE — 99024 PR POST-OP FOLLOW-UP VISIT: ICD-10-PCS | Mod: S$GLB,,, | Performed by: SURGERY

## 2019-07-25 PROCEDURE — 99999 PR PBB SHADOW E&M-EST. PATIENT-LVL III: CPT | Mod: PBBFAC,,, | Performed by: SURGERY

## 2019-07-25 RX ORDER — TRAMADOL HYDROCHLORIDE 50 MG/1
50 TABLET ORAL ONCE
Qty: 30 TABLET | Refills: 0 | Status: SHIPPED | OUTPATIENT
Start: 2019-07-25 | End: 2019-07-25

## 2019-07-25 NOTE — PROGRESS NOTES
Breast Surgery  [facility]  Department of Surgery      REFERRING PROVIDER: No referring provider defined for this encounter.    Chief Complaint: Post-op Evaluation    Subjective:      Interval Hx: Patient is s/p right completion mastectomy and SLNB for recurrent ER/ID+ DCIS on 19. Still having some pain control issues, but pain improving daily. Afebrile, tolerating diet, ambulating. Drains putting out 60-75 mL daily. Denies any upper extremity swelling.    Patient ID: Alcira Selby is a 54 y.o. female with hx of R breast DCIS in  s/p lumpectomy and XRT with re-excision of close margins that was negative, s/p completion of tamoxifen . She presents with R breast DCIS, ER/ID +, nuclear grade III. 12:00 o'clock region anterior depth there are fine pleomorphic calcifications in a linear distribution measuring 17 mm in extent.     She endorses chronic right breast pain described as daily stinging x several minutes.   Patient does routinely do self breast exams.  Patient has not noted a change on breast exam.  Patient denies nipple discharge. Patient admits to to previous breast biopsy. Patient admits to a personal history of breast cancer.    Findings at that time were the following:   Tumor size: 17 mm   Tumor grade: high grade  Estrogen Receptor: +  Progesterone Receptor: +     In the right breast, 12:00 o'clock region anterior depth there are fine pleomorphic calcifications in a linear distribution measuring 17 mm in extent.     Family History: Great maternal aunt had breast cancer at unknown age   Maternal cousin ovarian cancer at 30  maternal aunt had breast cancer. Her maternal aunt used to work at [employer] and she is unsure if she had genetic testing.      Genetic testing: none    Ashkenazi inheritance: no    OB/Gyn history:               Number of pregnancies & age at first gestation:               Age of menarche & menopause: 13; 23 (total hysterectomy)              Last menstrual  "period: age 23              Body mass index is 35.65 kg/m².              Contraceptive Use/ HRT: OCP use x 4 years; denies any HRT.              Breastfeeding:"a few weeks"              Number of previous biopsies: 2 (2004 and 2012, both of right breast).      Past Medical History:   Diagnosis Date    Breast cancer     2004     Past Surgical History:   Procedure Laterality Date    BIOPSY, LYMPH NODE, SENTINEL RIGHT Right 7/17/2019    [facility]    FOOT SURGERY Bilateral     Plantar Fascities    HYSTERECTOMY      KNEE SURGERY Left     Total Knee Replacement    LYMPHADENECTOMY, AXILLARY RIGHT Right 7/17/2019    [facility]    MASTECTOMY      MASTECTOMY RIGHT Right 7/17/2019    [facility]    MYOMECTOMY      TONSILLECTOMY       Current Outpatient Medications on File Prior to Visit   Medication Sig Dispense Refill    butalbital-acetaminophen-caff -40 mg -40 mg Cap Take by mouth once daily.      cyanocobalamin (VITAMIN B-12) 100 MCG tablet Take by mouth.      docusate sodium (COLACE) 100 MG capsule Take 100 mg by mouth 2 (two) times daily.      fluticasone (FLONASE) 50 mcg/actuation nasal spray 1 spray by Each Nare route once daily.      meloxicam (MOBIC) 15 MG tablet Take 15 mg by mouth.      ondansetron (ZOFRAN) 4 MG tablet Take 1 tablet (4 mg total) by mouth every 8 (eight) hours as needed for Nausea. 10 tablet 3    oxybutynin (DITROPAN-XL) 10 MG 24 hr tablet TAKE 1 TABLET BY MOUTH ONCE DAILY 30 tablet 0    oxyCODONE-acetaminophen (PERCOCET) 5-325 mg per tablet Take 1 tablet by mouth every 6 (six) hours as needed for Pain. 20 tablet 0    predniSONE (DELTASONE) 10 MG tablet TK 1 T PO  BID  0    triamcinolone acetonide 0.1% (KENALOG) 0.1 % cream APPLY AA BID PRN  0    vitamin B complex (SUPER B COMPLEX-B-12 ORAL) Take by mouth.       No current " facility-administered medications on file prior to visit.      Social History     Socioeconomic History    Marital status:      Spouse name: Not on file    Number of children: Not on file    Years of education: Not on file    Highest education level: Not on file   Occupational History    Not on file   Social Needs    Financial resource strain: Not on file    Food insecurity:     Worry: Not on file     Inability: Not on file    Transportation needs:     Medical: Not on file     Non-medical: Not on file   Tobacco Use    Smoking status: Never Smoker    Smokeless tobacco: Never Used   Substance and Sexual Activity    Alcohol use: No     Frequency: Never    Drug use: No    Sexual activity: Not on file   Lifestyle    Physical activity:     Days per week: Not on file     Minutes per session: Not on file    Stress: Not on file   Relationships    Social connections:     Talks on phone: Not on file     Gets together: Not on file     Attends Roman [employer] service: Not on file     Active member of club or organization: Not on file     Attends meetings of clubs or organizations: Not on file     Relationship status: Not on file   Other Topics Concern    Not on file   Social History Narrative    Not on file     Family History   Problem Relation Age of Onset    Hypertension Father     Hypertension Mother     Cancer Maternal Aunt         breast cancer      Review of Systems   As above    Objective:   There were no vitals taken for this visit.    Physical Exam  HEENT: Normocephalic, atraumatic.    CV: regular rate and rhythm without murmurs.  Resp: Lungs clear to auscultation and percussion bilaterally.  Neck: nontender goiter present  Breast:  Incision CDI, some bruising persists around wound, no palpable fluctuance or erythema, LINDA with old blood in bulbs  Note: patient is unsure why she had a lumpectomy on the left side.  Lymph: No palpable adjacent lymph nodes    Radiology review: Images personally reviewed  by me in the clinic.     5/2/19 Screening mammogram at :  Marker at central right breast with focal asymmetry and stable calcifications.     5/21/19 Diagnostic mammogram of right breast:  1.7 cm area of fine, pleomorphic calcifications at the 12 o'clock position of the right breast, BIRADS 4B.    Thyroid U/S at OSH (in [place]) from 1/2019: A 7 mm right isoechoic nodule, and a 4 mm cyst noted in inferior pole.    TSH 0.77  Free T4 1.03    Assessment:       R- breast recurrent DCIS s/p right breast completion mastectomy with SLNB, pathology pending    Plan:   - Follow up pathology  - Keep drains today  - RTC in 2 weeks  - Ultram prescribed  - For non-toxic multinodular goiter, will follow up in January 2020 with repeat thyroid U/S and clinic appt    I have personally taken the history and examined this patient and agree with the resident's note as stated above.  The patient's pathology from her right completion mastectomy for estrogen and progesterone receptor positive stage 0 DCIS on 07/17/2019 is still currently pending.  She has a slight hematoma on the right chest wall which has begun to liquify and is being drained through the 2 Joe drains.  Each drain is still putting out greater than 75 cc each day and we will therefore keep her drains in place.  There are no signs of infection and she has a good cosmetic result.  She will follow up with me in 2 weeks for pathology review and hopefully drain removal once the drainage output has decreased to less than 30 cc from each drain daily.  We electronically prescribed Ultram 50 mg once daily to the [facility] pharmacy for some postop pain and discomfort to avoid opioid analgesics.    Follow-up with me in 2 weeks for pathology review and drain removal. No signs of active infection.

## 2019-07-29 ENCOUNTER — TELEPHONE (OUTPATIENT)
Dept: SURGERY | Facility: CLINIC | Age: 54
End: 2019-07-29

## 2019-07-29 NOTE — TELEPHONE ENCOUNTER
Called patient to check on the tramadol. She states she was unaware anything had been sent in for her. Gave her the [facility] pharm phone number and she will check with them and pick it up. And call back with further info is she needs it.    ----- Message from Adalid Chamberlain MD sent at 7/29/2019  3:46 PM CDT -----  Contact: Pt.Self   Did she ever try her tramadol? We prescribed her some at her 7/25 visit, but it doesn't look like she filled it. Or is it not working?    ----- Message -----  From: Missy Mead MA  Sent: 7/29/2019   1:43 PM  To: Darin Vaughan MD, Adalid Chamberlain MD    Good afternoon,    Patient is requesting a refill of her pain med. Was seen 7/25 for post-op.    Thank you,  Missy MALONEY  ----- Message -----  From: Ayesha Shah  Sent: 7/29/2019   1:34 PM  To: Alexus BALL Staff    Rx Refill/Request     Is this a Refill or New Rx:    Refill     Rx Name and Strength:    oxyCODONE-acetaminophen (PERCOCET) 5-325 mg per tablet    Preferred Pharmacy with phone number:   [facility] [phone]    Communication Preference:  [phone]    Additional Information:     Thank You     Thank You

## 2019-07-30 DIAGNOSIS — C50.911 MALIGNANT NEOPLASM OF RIGHT FEMALE BREAST, UNSPECIFIED ESTROGEN RECEPTOR STATUS, UNSPECIFIED SITE OF BREAST: Primary | ICD-10-CM

## 2019-07-30 RX ORDER — OXYCODONE AND ACETAMINOPHEN 5; 325 MG/1; MG/1
1 TABLET ORAL EVERY 6 HOURS PRN
Qty: 20 TABLET | Refills: 0 | Status: ON HOLD | OUTPATIENT
Start: 2019-07-30 | End: 2019-08-14 | Stop reason: HOSPADM

## 2019-07-31 ENCOUNTER — NURSE TRIAGE (OUTPATIENT)
Dept: ADMINISTRATIVE | Facility: CLINIC | Age: 54
End: 2019-07-31

## 2019-07-31 ENCOUNTER — HOSPITAL ENCOUNTER (EMERGENCY)
Facility: HOSPITAL | Age: 54
Discharge: HOME | End: 2019-08-01
Attending: EMERGENCY MEDICINE
Payer: COMMERCIAL

## 2019-07-31 VITALS
SYSTOLIC BLOOD PRESSURE: 160 MMHG | DIASTOLIC BLOOD PRESSURE: 101 MMHG | HEART RATE: 83 BPM | OXYGEN SATURATION: 99 % | BODY MASS INDEX: 34.86 KG/M2 | TEMPERATURE: 99 F | WEIGHT: 230 LBS | HEIGHT: 68 IN | RESPIRATION RATE: 16 BRPM

## 2019-07-31 DIAGNOSIS — G89.18 POST-OP PAIN: Primary | ICD-10-CM

## 2019-07-31 PROCEDURE — 99283 EMERGENCY DEPT VISIT LOW MDM: CPT

## 2019-07-31 PROCEDURE — 99284 PR EMERGENCY DEPT VISIT,LEVEL IV: ICD-10-PCS | Mod: ,,, | Performed by: EMERGENCY MEDICINE

## 2019-07-31 PROCEDURE — 99284 EMERGENCY DEPT VISIT MOD MDM: CPT | Mod: ,,, | Performed by: EMERGENCY MEDICINE

## 2019-07-31 RX ORDER — HYDROCODONE BITARTRATE AND ACETAMINOPHEN 10; 325 MG/1; MG/1
1 TABLET ORAL
Status: COMPLETED | OUTPATIENT
Start: 2019-08-01 | End: 2019-07-31

## 2019-07-31 RX ADMIN — HYDROCODONE BITARTRATE AND ACETAMINOPHEN 1 TABLET: 10; 325 TABLET ORAL at 11:07

## 2019-08-01 ENCOUNTER — TELEPHONE (OUTPATIENT)
Dept: SURGERY | Facility: CLINIC | Age: 54
End: 2019-08-01

## 2019-08-01 PROCEDURE — 25000003 PHARM REV CODE 250: Performed by: EMERGENCY MEDICINE

## 2019-08-01 NOTE — DISCHARGE INSTRUCTIONS
The drainage is likely from a reaction around your drain.      Return to the emergency department for any fevers, red streaking up your chest, warmth, tenderness, or other signs of infection.    Be sure to follow up with Dr. Vaughan in clinic.

## 2019-08-01 NOTE — ED PROVIDER NOTES
"Encounter Date: 7/31/2019    SCRIBE #1 NOTE: I, Shadia Eduardo, am scribing for, and in the presence of,  Alexis Lake MD. I have scribed the entire note.       History     Chief Complaint   Patient presents with    Post-op Problem     Recent right breast surgery d/t cancer. Incision looks infected     Time patient was seen by the provider: 11:41 PM    54 y.o. W post-op right mastectomy, right axilla lymphnodectomy on 7/17 by Dr. Vaughan presents with chief complaint of drainage from surgical incision. Patient had post op course complication with small hematoma. She has two drains in place. She has had bloody drainage from both drains that has been stable. She emptys them q8 hours. This evening while showering, patient noticed "a lot" of yellow drainage from around the base of her right drain where it exits her chest. It has stopped at this time without any interventions. She has had persistent pain in the right chest since the surgery but it has not worsened this evening. No fever or weakness.  Patient's last follow up with Alexus was on July 25 at which time she was progressing appropriately and she plans to follow up in clinic next week.     The history is provided by the patient.     Review of patient's allergies indicates:   Allergen Reactions    Sulfamethoxazole-trimethoprim Hives and Itching     Past Medical History:   Diagnosis Date    Breast cancer     2004     Past Surgical History:   Procedure Laterality Date    BIOPSY, LYMPH NODE, SENTINEL RIGHT Right 7/17/2019    [facility]    FOOT SURGERY Bilateral     Plantar Fascities    HYSTERECTOMY      KNEE SURGERY Left     Total Knee Replacement    LYMPHADENECTOMY, AXILLARY RIGHT Right 7/17/2019    [facility]    MASTECTOMY      MASTECTOMY RIGHT Right 7/17/2019    [facility] at [facility]    MYOMECTOMY      TONSILLECTOMY       Family " History   Problem Relation Age of Onset    Hypertension Father     Hypertension Mother     Cancer Maternal Aunt         breast cancer     Social History     Tobacco Use    Smoking status: Never Smoker    Smokeless tobacco: Never Used   Substance Use Topics    Alcohol use: No     Frequency: Never    Drug use: No     Review of Systems   Constitutional: Negative for chills and fever.   HENT: Negative for sore throat.    Respiratory: Negative for cough and shortness of breath.    Cardiovascular: Negative for chest pain.   Gastrointestinal: Negative for nausea and vomiting.   Genitourinary: Negative for dysuria, frequency and urgency.   Musculoskeletal: Negative for back pain.   Skin: Negative for rash.        Right mastectomy incision drainage   Neurological: Negative for syncope and weakness.   Hematological: Does not bruise/bleed easily.   Psychiatric/Behavioral: Negative for agitation, behavioral problems and confusion.       Physical Exam     Initial Vitals [07/31/19 2316]   BP Pulse Resp Temp SpO2   (!) 160/101 83 16 98.7 °F (37.1 °C) 99 %      MAP       --         Physical Exam    Nursing note and vitals reviewed.  Constitutional: She appears well-developed and well-nourished. She is not diaphoretic. No distress.   HENT:   Head: Normocephalic and atraumatic.   Eyes: EOM are normal. Pupils are equal, round, and reactive to light. Right eye exhibits no discharge. Left eye exhibits no discharge. No scleral icterus.   Neck: Normal range of motion. Neck supple. No JVD present.   Cardiovascular: Normal rate, regular rhythm, normal heart sounds and intact distal pulses. Exam reveals no gallop and no friction rub.    No murmur heard.  Pulmonary/Chest: Breath sounds normal. No respiratory distress. She has no wheezes. She has no rhonchi. She has no rales. She exhibits no tenderness.   Right mastectomy incision clean, dry, and intact. Mo fluctuance in R axilla. Two LINDA drains in place. From the lateral LINDA drain, I am  able to express a mild amount of purulent drainage. The more medial drain have no drainage but more tenderness to palpation. No erythema, warmth, or tenderness.   Abdominal: Soft. Bowel sounds are normal. She exhibits no distension and no mass. There is no tenderness. There is no rebound and no guarding.   Musculoskeletal: Normal range of motion. She exhibits no edema or tenderness.   Lymphadenopathy:     She has no cervical adenopathy.   Neurological: She is alert and oriented to person, place, and time. She has normal strength. No sensory deficit.   Skin: Skin is warm and dry. Capillary refill takes less than 2 seconds.   Psychiatric: She has a normal mood and affect.         ED Course   Procedures  Labs Reviewed - No data to display       Imaging Results    None          Medical Decision Making:   History:   Old Medical Records: I decided to obtain old medical records.  Old Records Summarized: records from clinic visits.       <> Summary of Records: Records summarized in HPI  Initial Assessment:   54 y.o. W post-op right mastectomy, right axilla lymphnodectomy on 7/17 by Dr. Vaughan presents with chief complaint of drainage from surgical incision.  Differential Diagnosis:   Post op drainage, infection, cellulitis, abscess  ED Management:  I am unable to palpate any fluctuance amountable to drainage. Will obtain basic labs. General surgery consult for wound evaluation. Will administer analgesic    Reassessment: Patient was seen and evaluated by Dr. Llamas, general surgery resident on-call. He feels the drainage is consistent with reaction to drain and does not appear to be infected.  He does not recommend lab work.  Recommends outpatient follow up with Alexus. Patient informed of plan. She was proved with extensive return precautions. She is comfortable with discharge.             Scribe Attestation:   Scribe #1: I performed the above scribed service and the documentation accurately describes the services I  performed. I attest to the accuracy of the note.    Attending Attestation:           Physician Attestation for Scribe:      Comments: I, Dr. Alexis Lake, personally performed the services described in this documentation. All medical record entries made by the scribe were at my direction and in my presence.  I have reviewed the chart and agree that the record reflects my personal performance and is accurate and complete. Alexis Lake MD.  2:25 AM 08/01/2019                 Clinical Impression:       ICD-10-CM ICD-9-CM   1. Post-op pain G89.18 338.18         Disposition:   Disposition: Discharged                        Alexis Lake MD  08/01/19 0225

## 2019-08-01 NOTE — TREATMENT PLAN
Patient seen and examined.  S/p R mastectomy on 7/17.  Still with 2 drains in place.  Presents with pain and slight change in drainage.  Denies fevers/chills.  VSS.  No signs of infection seen.  Old blood/serous output in drains.  Some inflammatory change around drains itself but this is to be expected.  States that she refilled her pain medication yesterday.  OK to d/c home.  Has f/u with Dr Vaughan next week.  Instructed to call/come in sooner with issues.  She is in agreement.        Doron Whitman M.D.  General Surgery PGY4  [phone]

## 2019-08-01 NOTE — TELEPHONE ENCOUNTER
Reason for Disposition   [1] Incision looks infected (spreading redness, pain) AND [2] large red area (> 2 in. or 5 cm)    Protocols used: POST-OP INCISION SYMPTOMS-A-AH  Surg 7/17   pt called re incision- yellowish discharge from drain site and incision, redness at incision site. pain level 10- last dose pain med at 8pm. rec ED. Pt agrees and is headed to ED. call back wit questions

## 2019-08-01 NOTE — TELEPHONE ENCOUNTER
Tried to call patient to follow up on her ED visit last night, she did not answer. She has an upcoming appointment with Dr. Vaughan for 8/8/19. Left her a message to call back if she needs anything before then.

## 2019-08-02 ENCOUNTER — PES CALL (OUTPATIENT)
Dept: ADMINISTRATIVE | Facility: CLINIC | Age: 54
End: 2019-08-02

## 2019-08-08 ENCOUNTER — OFFICE VISIT (OUTPATIENT)
Dept: SURGERY | Facility: CLINIC | Age: 54
End: 2019-08-08
Payer: COMMERCIAL

## 2019-08-08 VITALS
SYSTOLIC BLOOD PRESSURE: 151 MMHG | HEART RATE: 89 BPM | BODY MASS INDEX: 34.97 KG/M2 | TEMPERATURE: 99 F | HEIGHT: 68 IN | DIASTOLIC BLOOD PRESSURE: 100 MMHG

## 2019-08-08 DIAGNOSIS — D05.11 DUCTAL CARCINOMA IN SITU (DCIS) OF RIGHT BREAST: Primary | ICD-10-CM

## 2019-08-08 PROCEDURE — 99024 POSTOP FOLLOW-UP VISIT: CPT | Mod: S$GLB,,, | Performed by: SURGERY

## 2019-08-08 PROCEDURE — 99999 PR PBB SHADOW E&M-EST. PATIENT-LVL III: CPT | Mod: PBBFAC,,, | Performed by: SURGERY

## 2019-08-08 PROCEDURE — 99999 PR PBB SHADOW E&M-EST. PATIENT-LVL III: ICD-10-PCS | Mod: PBBFAC,,, | Performed by: SURGERY

## 2019-08-08 PROCEDURE — 99024 PR POST-OP FOLLOW-UP VISIT: ICD-10-PCS | Mod: S$GLB,,, | Performed by: SURGERY

## 2019-08-08 RX ORDER — DOXYCYCLINE 100 MG/1
100 CAPSULE ORAL EVERY 12 HOURS
Qty: 20 CAPSULE | Refills: 0 | Status: SHIPPED | OUTPATIENT
Start: 2019-08-08 | End: 2019-08-18

## 2019-08-08 NOTE — PROGRESS NOTES
Ms. 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     Past Medical History:   Diagnosis Date    Breast cancer     2004     Past Surgical History:   Procedure Laterality Date    BIOPSY, LYMPH NODE, SENTINEL RIGHT Right 7/17/2019    [facility]    FOOT SURGERY Bilateral     Plantar Fascities    HYSTERECTOMY      KNEE SURGERY Left     Total Knee Replacement    LYMPHADENECTOMY, AXILLARY RIGHT Right 7/17/2019    [facility]    MASTECTOMY      MASTECTOMY RIGHT Right 7/17/2019    [facility]    MYOMECTOMY      TONSILLECTOMY       Family History   Problem Relation Age of Onset    Hypertension Father     Hypertension Mother     Cancer Maternal Aunt         breast cancer         Current Outpatient Medications:     butalbital-acetaminophen-caff -40 mg -40 mg Cap, Take by mouth once daily., Disp: , Rfl:     cyanocobalamin (VITAMIN B-12) 100 MCG tablet, Take by mouth., Disp: , Rfl:     docusate sodium (COLACE) 100 MG capsule, Take 100 mg by mouth 2 (two) times daily., Disp: , Rfl:     fluticasone (FLONASE) 50 mcg/actuation nasal spray, 1 spray by Each Nare route once daily., Disp: , Rfl:     meloxicam (MOBIC) 15 MG tablet, Take 15 mg by mouth., Disp: , Rfl:     ondansetron (ZOFRAN) 4 MG tablet, Take 1 tablet (4 mg total) by mouth every 8 (eight) hours as needed for Nausea., Disp: 10 tablet, Rfl: 3    oxybutynin (DITROPAN-XL) 10 MG 24 hr tablet, TAKE 1 TABLET BY MOUTH ONCE DAILY, Disp: 30 tablet, Rfl: 0    oxyCODONE-acetaminophen (PERCOCET) 5-325 mg per tablet, Take 1 tablet by mouth every 6 (six) hours as needed for Pain., Disp: 20 tablet, Rfl: 0    predniSONE (DELTASONE) 10 MG tablet, TK 1 T PO  BID, Disp: , Rfl: 0    triamcinolone acetonide 0.1% (KENALOG) 0.1 %  cream, APPLY AA BID PRN, Disp: , Rfl: 0    vitamin B complex (SUPER B COMPLEX-B-12 ORAL), Take by mouth., Disp: , Rfl:     doxycycline (VIBRAMYCIN) 100 MG Cap, Take 1 capsule (100 mg total) by mouth every 12 (twelve) hours. for 10 days, Disp: 20 capsule, Rfl: 0      PHYS EXAM:   Vitals:    08/08/19 1101   BP: (!) 151/100   Pulse: 89   Temp: 98.8 °F (37.1 °C)      Drain sites appear infected, minimal discharge upon removal.  Allergic to Bactrim.      Minor swelling around R mastectomy site.  Incision c/d/i    PLAN:  -Referral to plastics for flap   -Resume PT   -Knitted knocker   -Rx for doxycycline 100mg bid for 10 days  -For non-toxic multinodular goiter, will F/U in Jan 2020 with repeat thyroid U/S and clinic appt     I have personally taken the history and examined this patient and agree with the resident's note as stated above.  Patient prefers to have autologous tissue reconstruction of the right breast and right chest wall given the prior history of radiotherapy.  She also would prefer to have a prophylactic left mastectomy if covered by insurance and bilateral reconstruction if possible.  She has been referred to Plastic surgery.    Final pathology revealed DCIS only and she should not require any further adjuvant therapy given clear margins and negative sentinel nodes for stage 0 DCIS of the right breast having had prior right breast cancer treated with breast conservation surgery and radiotherapy in the remote past.    Certainly if she is strongly wanting to pursue a prophylactic left contralateral mastectomy at the time of definitive autologous tissue reconstruction there would be no benefit for any endocrine therapy for risk reduction in the setting since she was then end up with a bilateral mastectomy for stage 0 DCIS.    We will refer her to Plastic surgery to discuss autologous tissue reconstruction and potential prophylactic left mastectomy all in the same setting for patient desire.

## 2019-08-10 ENCOUNTER — HOSPITAL ENCOUNTER (INPATIENT)
Facility: HOSPITAL | Age: 54
LOS: 2 days | Discharge: HOME | DRG: 863 | End: 2019-08-15
Attending: EMERGENCY MEDICINE | Admitting: SURGERY
Payer: COMMERCIAL

## 2019-08-10 DIAGNOSIS — T81.49XA WOUND INFECTION AFTER SURGERY: Primary | ICD-10-CM

## 2019-08-10 PROCEDURE — 96375 TX/PRO/DX INJ NEW DRUG ADDON: CPT

## 2019-08-10 PROCEDURE — 63600175 PHARM REV CODE 636 W HCPCS: Performed by: PHYSICIAN ASSISTANT

## 2019-08-10 PROCEDURE — 99285 PR EMERGENCY DEPT VISIT,LEVEL V: ICD-10-PCS | Mod: ,,, | Performed by: EMERGENCY MEDICINE

## 2019-08-10 PROCEDURE — 25000003 PHARM REV CODE 250: Performed by: PHYSICIAN ASSISTANT

## 2019-08-10 PROCEDURE — 96374 THER/PROPH/DIAG INJ IV PUSH: CPT

## 2019-08-10 PROCEDURE — 99285 EMERGENCY DEPT VISIT HI MDM: CPT | Mod: 25

## 2019-08-10 PROCEDURE — 83605 ASSAY OF LACTIC ACID: CPT

## 2019-08-10 PROCEDURE — 80053 COMPREHEN METABOLIC PANEL: CPT

## 2019-08-10 PROCEDURE — 99285 EMERGENCY DEPT VISIT HI MDM: CPT | Mod: ,,, | Performed by: EMERGENCY MEDICINE

## 2019-08-10 PROCEDURE — 85025 COMPLETE CBC W/AUTO DIFF WBC: CPT

## 2019-08-10 RX ORDER — MORPHINE SULFATE 4 MG/ML
4 INJECTION, SOLUTION INTRAMUSCULAR; INTRAVENOUS
Status: COMPLETED | OUTPATIENT
Start: 2019-08-10 | End: 2019-08-10

## 2019-08-10 RX ORDER — ACETAMINOPHEN 500 MG
1000 TABLET ORAL
Status: COMPLETED | OUTPATIENT
Start: 2019-08-10 | End: 2019-08-10

## 2019-08-10 RX ORDER — ONDANSETRON 2 MG/ML
4 INJECTION INTRAMUSCULAR; INTRAVENOUS
Status: COMPLETED | OUTPATIENT
Start: 2019-08-10 | End: 2019-08-10

## 2019-08-10 RX ADMIN — ONDANSETRON 4 MG: 2 INJECTION INTRAMUSCULAR; INTRAVENOUS at 11:08

## 2019-08-10 RX ADMIN — MORPHINE SULFATE 4 MG: 4 INJECTION INTRAVENOUS at 11:08

## 2019-08-10 RX ADMIN — ACETAMINOPHEN 1000 MG: 500 TABLET ORAL at 11:08

## 2019-08-11 PROBLEM — T81.49XA SURGICAL SITE INFECTION: Status: ACTIVE | Noted: 2019-08-11

## 2019-08-11 PROBLEM — T81.49XA SURGICAL SITE INFECTION: Status: RESOLVED | Noted: 2019-08-11 | Resolved: 2019-08-11

## 2019-08-11 PROBLEM — T81.49XA WOUND INFECTION AFTER SURGERY: Status: ACTIVE | Noted: 2019-08-11

## 2019-08-11 LAB
ALBUMIN SERPL BCP-MCNC: 3 G/DL (ref 3.5–5.2)
ALBUMIN SERPL BCP-MCNC: 3.5 G/DL (ref 3.5–5.2)
ALP SERPL-CCNC: 67 U/L (ref 55–135)
ALP SERPL-CCNC: 82 U/L (ref 55–135)
ALT SERPL W/O P-5'-P-CCNC: 14 U/L (ref 10–44)
ALT SERPL W/O P-5'-P-CCNC: 16 U/L (ref 10–44)
ANION GAP SERPL CALC-SCNC: 10 MMOL/L (ref 8–16)
ANION GAP SERPL CALC-SCNC: 12 MMOL/L (ref 8–16)
ANISOCYTOSIS BLD QL SMEAR: SLIGHT
AST SERPL-CCNC: 13 U/L (ref 10–40)
AST SERPL-CCNC: 15 U/L (ref 10–40)
BASOPHILS # BLD AUTO: 0.09 K/UL (ref 0–0.2)
BASOPHILS NFR BLD: 1 % (ref 0–1.9)
BILIRUB SERPL-MCNC: 0.3 MG/DL (ref 0.1–1)
BILIRUB SERPL-MCNC: 0.4 MG/DL (ref 0.1–1)
BUN SERPL-MCNC: 12 MG/DL (ref 6–20)
BUN SERPL-MCNC: 14 MG/DL (ref 6–20)
CALCIUM SERPL-MCNC: 9.1 MG/DL (ref 8.7–10.5)
CALCIUM SERPL-MCNC: 9.5 MG/DL (ref 8.7–10.5)
CHLORIDE SERPL-SCNC: 105 MMOL/L (ref 95–110)
CHLORIDE SERPL-SCNC: 107 MMOL/L (ref 95–110)
CO2 SERPL-SCNC: 23 MMOL/L (ref 23–29)
CO2 SERPL-SCNC: 24 MMOL/L (ref 23–29)
CREAT SERPL-MCNC: 0.7 MG/DL (ref 0.5–1.4)
CREAT SERPL-MCNC: 0.8 MG/DL (ref 0.5–1.4)
DIFFERENTIAL METHOD: ABNORMAL
EOSINOPHIL # BLD AUTO: 0.1 K/UL (ref 0–0.5)
EOSINOPHIL NFR BLD: 1 % (ref 0–8)
ERYTHROCYTE [DISTWIDTH] IN BLOOD BY AUTOMATED COUNT: 13.1 % (ref 11.5–14.5)
EST. GFR  (AFRICAN AMERICAN): >60 ML/MIN/1.73 M^2
EST. GFR  (AFRICAN AMERICAN): >60 ML/MIN/1.73 M^2
EST. GFR  (NON AFRICAN AMERICAN): >60 ML/MIN/1.73 M^2
EST. GFR  (NON AFRICAN AMERICAN): >60 ML/MIN/1.73 M^2
GLUCOSE SERPL-MCNC: 101 MG/DL (ref 70–110)
GLUCOSE SERPL-MCNC: 104 MG/DL (ref 70–110)
HCT VFR BLD AUTO: 36.4 % (ref 37–48.5)
HGB BLD-MCNC: 11.6 G/DL (ref 12–16)
HYPOCHROMIA BLD QL SMEAR: ABNORMAL
IMM GRANULOCYTES # BLD AUTO: 0.05 K/UL (ref 0–0.04)
IMM GRANULOCYTES NFR BLD AUTO: 0.5 % (ref 0–0.5)
LACTATE SERPL-SCNC: 1.2 MMOL/L (ref 0.5–2.2)
LYMPHOCYTES # BLD AUTO: 3.2 K/UL (ref 1–4.8)
LYMPHOCYTES NFR BLD: 34 % (ref 18–48)
MCH RBC QN AUTO: 29.3 PG (ref 27–31)
MCHC RBC AUTO-ENTMCNC: 31.9 G/DL (ref 32–36)
MCV RBC AUTO: 92 FL (ref 82–98)
MONOCYTES # BLD AUTO: 0.8 K/UL (ref 0.3–1)
MONOCYTES NFR BLD: 8.3 % (ref 4–15)
NEUTROPHILS # BLD AUTO: 5.2 K/UL (ref 1.8–7.7)
NEUTROPHILS NFR BLD: 55.2 % (ref 38–73)
NRBC BLD-RTO: 0 /100 WBC
PLATELET # BLD AUTO: 421 K/UL (ref 150–350)
PLATELET BLD QL SMEAR: ABNORMAL
PMV BLD AUTO: 9.5 FL (ref 9.2–12.9)
POLYCHROMASIA BLD QL SMEAR: ABNORMAL
POTASSIUM SERPL-SCNC: 3.2 MMOL/L (ref 3.5–5.1)
POTASSIUM SERPL-SCNC: 3.8 MMOL/L (ref 3.5–5.1)
PROT SERPL-MCNC: 6.1 G/DL (ref 6–8.4)
PROT SERPL-MCNC: 7.2 G/DL (ref 6–8.4)
RBC # BLD AUTO: 3.96 M/UL (ref 4–5.4)
SODIUM SERPL-SCNC: 140 MMOL/L (ref 136–145)
SODIUM SERPL-SCNC: 141 MMOL/L (ref 136–145)
WBC # BLD AUTO: 9.37 K/UL (ref 3.9–12.7)

## 2019-08-11 PROCEDURE — 63600175 PHARM REV CODE 636 W HCPCS: Performed by: STUDENT IN AN ORGANIZED HEALTH CARE EDUCATION/TRAINING PROGRAM

## 2019-08-11 PROCEDURE — G0378 HOSPITAL OBSERVATION PER HR: HCPCS

## 2019-08-11 PROCEDURE — 25000003 PHARM REV CODE 250: Performed by: STUDENT IN AN ORGANIZED HEALTH CARE EDUCATION/TRAINING PROGRAM

## 2019-08-11 PROCEDURE — S0077 INJECTION, CLINDAMYCIN PHOSP: HCPCS | Performed by: STUDENT IN AN ORGANIZED HEALTH CARE EDUCATION/TRAINING PROGRAM

## 2019-08-11 PROCEDURE — 80053 COMPREHEN METABOLIC PANEL: CPT

## 2019-08-11 PROCEDURE — 36415 COLL VENOUS BLD VENIPUNCTURE: CPT

## 2019-08-11 RX ORDER — ONDANSETRON 8 MG/1
8 TABLET, ORALLY DISINTEGRATING ORAL EVERY 8 HOURS PRN
Status: DISCONTINUED | OUTPATIENT
Start: 2019-08-11 | End: 2019-08-15 | Stop reason: HOSPADM

## 2019-08-11 RX ORDER — ENOXAPARIN SODIUM 100 MG/ML
40 INJECTION SUBCUTANEOUS EVERY 24 HOURS
Status: DISCONTINUED | OUTPATIENT
Start: 2019-08-11 | End: 2019-08-15 | Stop reason: HOSPADM

## 2019-08-11 RX ORDER — OXYCODONE AND ACETAMINOPHEN 5; 325 MG/1; MG/1
1 TABLET ORAL EVERY 4 HOURS PRN
Status: DISCONTINUED | OUTPATIENT
Start: 2019-08-11 | End: 2019-08-12

## 2019-08-11 RX ORDER — ACETAMINOPHEN 325 MG/1
650 TABLET ORAL EVERY 8 HOURS PRN
Status: DISCONTINUED | OUTPATIENT
Start: 2019-08-11 | End: 2019-08-12

## 2019-08-11 RX ORDER — SODIUM CHLORIDE 0.9 % (FLUSH) 0.9 %
10 SYRINGE (ML) INJECTION
Status: DISCONTINUED | OUTPATIENT
Start: 2019-08-11 | End: 2019-08-15 | Stop reason: HOSPADM

## 2019-08-11 RX ORDER — DOCUSATE SODIUM 100 MG/1
100 CAPSULE, LIQUID FILLED ORAL 2 TIMES DAILY
Status: DISCONTINUED | OUTPATIENT
Start: 2019-08-11 | End: 2019-08-15 | Stop reason: HOSPADM

## 2019-08-11 RX ORDER — PROMETHAZINE HYDROCHLORIDE 12.5 MG/1
12.5 TABLET ORAL EVERY 6 HOURS PRN
Status: DISCONTINUED | OUTPATIENT
Start: 2019-08-11 | End: 2019-08-15 | Stop reason: HOSPADM

## 2019-08-11 RX ORDER — OXYCODONE AND ACETAMINOPHEN 10; 325 MG/1; MG/1
1 TABLET ORAL EVERY 4 HOURS PRN
Status: DISCONTINUED | OUTPATIENT
Start: 2019-08-11 | End: 2019-08-12

## 2019-08-11 RX ORDER — CLINDAMYCIN PHOSPHATE 600 MG/50ML
600 INJECTION, SOLUTION INTRAVENOUS
Status: DISCONTINUED | OUTPATIENT
Start: 2019-08-11 | End: 2019-08-14

## 2019-08-11 RX ORDER — OXYBUTYNIN CHLORIDE 10 MG/1
10 TABLET, EXTENDED RELEASE ORAL DAILY
Status: DISCONTINUED | OUTPATIENT
Start: 2019-08-11 | End: 2019-08-15 | Stop reason: HOSPADM

## 2019-08-11 RX ORDER — HYDROMORPHONE HYDROCHLORIDE 1 MG/ML
0.5 INJECTION, SOLUTION INTRAMUSCULAR; INTRAVENOUS; SUBCUTANEOUS EVERY 4 HOURS PRN
Status: DISCONTINUED | OUTPATIENT
Start: 2019-08-11 | End: 2019-08-15 | Stop reason: HOSPADM

## 2019-08-11 RX ADMIN — OXYCODONE HYDROCHLORIDE AND ACETAMINOPHEN 1 TABLET: 10; 325 TABLET ORAL at 03:08

## 2019-08-11 RX ADMIN — DOCUSATE SODIUM 100 MG: 100 CAPSULE, LIQUID FILLED ORAL at 08:08

## 2019-08-11 RX ADMIN — HYDROMORPHONE HYDROCHLORIDE 0.5 MG: 1 INJECTION, SOLUTION INTRAMUSCULAR; INTRAVENOUS; SUBCUTANEOUS at 02:08

## 2019-08-11 RX ADMIN — CLINDAMYCIN IN 5 PERCENT DEXTROSE 600 MG: 12 INJECTION, SOLUTION INTRAVENOUS at 03:08

## 2019-08-11 RX ADMIN — CLINDAMYCIN IN 5 PERCENT DEXTROSE 600 MG: 12 INJECTION, SOLUTION INTRAVENOUS at 10:08

## 2019-08-11 RX ADMIN — OXYCODONE HYDROCHLORIDE AND ACETAMINOPHEN 1 TABLET: 10; 325 TABLET ORAL at 06:08

## 2019-08-11 RX ADMIN — PROMETHAZINE HYDROCHLORIDE 12.5 MG: 12.5 TABLET ORAL at 03:08

## 2019-08-11 RX ADMIN — HYDROMORPHONE HYDROCHLORIDE 0.5 MG: 1 INJECTION, SOLUTION INTRAMUSCULAR; INTRAVENOUS; SUBCUTANEOUS at 10:08

## 2019-08-11 RX ADMIN — PIPERACILLIN AND TAZOBACTAM 4.5 G: 4; .5 INJECTION, POWDER, LYOPHILIZED, FOR SOLUTION INTRAVENOUS; PARENTERAL at 02:08

## 2019-08-11 RX ADMIN — ONDANSETRON 8 MG: 8 TABLET, ORALLY DISINTEGRATING ORAL at 02:08

## 2019-08-11 RX ADMIN — PROMETHAZINE HYDROCHLORIDE 12.5 MG: 12.5 TABLET ORAL at 08:08

## 2019-08-11 RX ADMIN — ENOXAPARIN SODIUM 40 MG: 100 INJECTION SUBCUTANEOUS at 05:08

## 2019-08-11 RX ADMIN — OXYCODONE HYDROCHLORIDE AND ACETAMINOPHEN 1 TABLET: 10; 325 TABLET ORAL at 08:08

## 2019-08-11 RX ADMIN — HYDROMORPHONE HYDROCHLORIDE 0.5 MG: 1 INJECTION, SOLUTION INTRAMUSCULAR; INTRAVENOUS; SUBCUTANEOUS at 09:08

## 2019-08-11 RX ADMIN — CLINDAMYCIN IN 5 PERCENT DEXTROSE 600 MG: 12 INJECTION, SOLUTION INTRAVENOUS at 08:08

## 2019-08-11 RX ADMIN — HYDROMORPHONE HYDROCHLORIDE 0.5 MG: 1 INJECTION, SOLUTION INTRAMUSCULAR; INTRAVENOUS; SUBCUTANEOUS at 05:08

## 2019-08-11 RX ADMIN — OXYCODONE HYDROCHLORIDE AND ACETAMINOPHEN 1 TABLET: 10; 325 TABLET ORAL at 10:08

## 2019-08-11 RX ADMIN — OXYBUTYNIN CHLORIDE 10 MG: 10 TABLET, EXTENDED RELEASE ORAL at 08:08

## 2019-08-11 NOTE — SUBJECTIVE & OBJECTIVE
Interval History: No issues overnight  AFVSS  Small amount serous drainage from medial aspect of incision    Medications:  Continuous Infusions:  Scheduled Meds:   clindamycin (CLEOCIN) IVPB  600 mg Intravenous Q8H    docusate sodium  100 mg Oral BID    oxybutynin  10 mg Oral Daily     PRN Meds:acetaminophen, HYDROmorphone, ondansetron, oxyCODONE-acetaminophen, oxyCODONE-acetaminophen, sodium chloride 0.9%     Review of patient's allergies indicates:   Allergen Reactions    Sulfamethoxazole-trimethoprim Hives and Itching     Objective:     Vital Signs (Most Recent):  Temp: 96.4 °F (35.8 °C) (08/11/19 0825)  Pulse: (!) 54 (08/11/19 0825)  Resp: 16 (08/11/19 0825)  BP: 108/60 (08/11/19 0825)  SpO2: 98 % (08/11/19 0825) Vital Signs (24h Range):  Temp:  [96.4 °F (35.8 °C)-100 °F (37.8 °C)] 96.4 °F (35.8 °C)  Pulse:  [53-94] 54  Resp:  [16-21] 16  SpO2:  [92 %-100 %] 98 %  BP: ()/(57-94) 108/60     Weight: 99.8 kg (220 lb)  Body mass index is 33.45 kg/m².    Intake/Output - Last 3 Shifts       08/09 0700 - 08/10 0659 08/10 0700 - 08/11 0659 08/11 0700 - 08/12 0659    P.O.  200     Total Intake(mL/kg)  200 (2)     Net  +200            Urine Occurrence  1 x     Stool Occurrence  0 x     Emesis Occurrence  0 x           Physical Exam   Constitutional: She appears well-developed.   Cardiovascular: Normal rate and regular rhythm.   Pulmonary/Chest: Effort normal.       Abdominal: Soft.   Neurological: She is alert.   Skin: There is erythema.       Significant Labs:  CBC:   Recent Labs   Lab 08/10/19  2334   WBC 9.37   RBC 3.96*   HGB 11.6*   HCT 36.4*   *   MCV 92   MCH 29.3   MCHC 31.9*     BMP:   Recent Labs   Lab 08/11/19  0421         K 3.2*      CO2 24   BUN 12   CREATININE 0.7   CALCIUM 9.1     CMP:   Recent Labs   Lab 08/11/19  0421      CALCIUM 9.1   ALBUMIN 3.0*   PROT 6.1      K 3.2*   CO2 24      BUN 12   CREATININE 0.7   ALKPHOS 67   ALT 14   AST 13   BILITOT  0.4     LFTs:   Recent Labs   Lab 08/11/19  0421   ALT 14   AST 13   ALKPHOS 67   BILITOT 0.4   PROT 6.1   ALBUMIN 3.0*       Significant Diagnostics:  I have reviewed all pertinent imaging results/findings within the past 24 hours.

## 2019-08-11 NOTE — ED PROVIDER NOTES
Encounter Date: 8/10/2019       History     Chief Complaint   Patient presents with    Post-op Problem     Patient reports right mastectomy on July 17.  Red Drainage now coming from incision site. Pt also reports pain and a foul odor     Patient is a 54 year old female with PMHx of breast cancer. She presents to the ED for post op problem. Patient recently underwent right breast mastectomy, sentinel lymph node biopsy, and axillary node dissection on 07/17/19 by Dr. Vaughan. Patient reports having right breast purulent drainage since today. Reports associated erythema and pain over incision site. Denies active chemo or radiation at this time. She denies fever,chills, nausea, vomiting, sob, chest pain, abd pain, dysuria, diarrhea, or constipation. She is a non smoker and denies alcohol use.    The history is provided by the patient and medical records. No  was used.     Review of patient's allergies indicates:   Allergen Reactions    Sulfamethoxazole-trimethoprim Hives and Itching     Past Medical History:   Diagnosis Date    Breast cancer     2004     Past Surgical History:   Procedure Laterality Date    BIOPSY, LYMPH NODE, SENTINEL RIGHT Right 7/17/2019    [facility]    FOOT SURGERY Bilateral     Plantar Fascities    HYSTERECTOMY      KNEE SURGERY Left     Total Knee Replacement    LYMPHADENECTOMY, AXILLARY RIGHT Right 7/17/2019    [facility]    MASTECTOMY      MASTECTOMY RIGHT Right 7/17/2019    [facility]    MYOMECTOMY      TONSILLECTOMY       Family History   Problem Relation Age of Onset    Hypertension Father     Hypertension Mother     Cancer Maternal Aunt         breast cancer     Social History     Tobacco Use    Smoking status: Never Smoker    Smokeless tobacco: Never Used   Substance Use Topics    Alcohol use: No     Frequency: Never     Drug use: No     Review of Systems   Constitutional: Negative for fever.   HENT: Negative for sore throat.    Respiratory: Negative for shortness of breath.    Cardiovascular: Negative for chest pain.   Gastrointestinal: Negative for abdominal pain, nausea and vomiting.   Genitourinary: Negative for dysuria.   Musculoskeletal: Negative for back pain.   Skin: Positive for color change and wound. Negative for rash.   Neurological: Negative for weakness.   Hematological: Does not bruise/bleed easily.       Physical Exam     Initial Vitals [08/10/19 2127]   BP Pulse Resp Temp SpO2   (!) 146/87 94 18 100 °F (37.8 °C) 100 %      MAP       --         Physical Exam    Vitals reviewed.  Constitutional: She appears well-developed and well-nourished. No distress.   HENT:   Head: Normocephalic.   Eyes: Conjunctivae are normal.   Neck: Normal range of motion.   Cardiovascular: Normal rate and regular rhythm.   No murmur heard.  Pulmonary/Chest: Breath sounds normal. No respiratory distress. She has no wheezes. She has no rales. Right breast exhibits skin change (erythem over right mastectomy incision with purluent drainage and induration) and tenderness.   No erythema noted over right axilla.    Abdominal: Soft. Bowel sounds are normal. She exhibits no distension. There is no tenderness.   Musculoskeletal: Normal range of motion.   Neurological: She is alert and oriented to person, place, and time.   Skin: Skin is warm and dry. No erythema.         ED Course   Procedures  Labs Reviewed   CBC W/ AUTO DIFFERENTIAL - Abnormal; Notable for the following components:       Result Value    RBC 3.96 (*)     Hemoglobin 11.6 (*)     Hematocrit 36.4 (*)     Mean Corpuscular Hemoglobin Conc 31.9 (*)     Platelets 421 (*)     Immature Grans (Abs) 0.05 (*)     Platelet Estimate Increased (*)     All other components within normal limits   COMPREHENSIVE METABOLIC PANEL   LACTIC ACID, PLASMA          Imaging Results           US Chest  "Mediastinum (Final result)  Result time 08/11/19 02:21:02   Procedure changed from US Soft Tissue Misc     Final result by Margarito Perkins MD (08/11/19 02:21:02)                 Impression:      Ill-defined complex fluid collection with internal foci of air measuring 6.2 cm in maximal diameter overlying the incision site, concerning for abscess or hematoma.  There is an additional smaller more medial 1.3 cm fluid collection.    This report was flagged in Epic as abnormal.    Electronically signed by resident: Angeles Monsalve  Date:    08/11/2019  Time:    02:01    Electronically signed by: Margarito Perkins MD  Date:    08/11/2019  Time:    02:21             Narrative:    EXAMINATION:  US CHEST MEDIASTINUM    CLINICAL HISTORY:  r/o fluid collection at right mastectomy site;    TECHNIQUE:  Grayscale and color sonographic images through the area of concern within the right chest.    COMPARISON:  None.    FINDINGS:  Limited sonographic imaging was performed in the region of the right chest overlying the incision from recent right mastectomy.    There is ill-defined complex heterogeneous fluid collection with internal foci of air.  The collection measures 4.1 x 6.2 x 1.4 cm and there is mild increased surrounding vascularity.  Additionally, there is a smaller collection more medially measuring 1.1 x 1.3 x 0.9 cm..                               X-Ray Chest AP Portable (Final result)  Result time 08/10/19 23:32:53    Final result by Margarito Perkins MD (08/10/19 23:32:53)                 Impression:      No large focal consolidation identified on this limited single view.      Electronically signed by: Margarito Perkins MD  Date:    08/10/2019  Time:    23:32             Narrative:    EXAMINATION:  XR CHEST AP PORTABLE    CLINICAL HISTORY:  Provided history is "Sepsis;  ".    TECHNIQUE:  One view of the chest.    COMPARISON:  06/24/2019.    FINDINGS:  Patient rotation and soft tissue attenuation of the x-ray beam limits " evaluation.  Cardiac electrodes overlie the chest.  Cardiac silhouette is magnified by portable technique.  Atherosclerotic calcifications overlie the aortic arch.  There is linear bibasilar subsegmental atelectasis, but no large focal consolidation identified on this limited single view.  No large pleural effusion.  No pneumothorax.                                 Medical Decision Making:   History:   Old Medical Records: I decided to obtain old medical records.  Clinical Tests:   Lab Tests: Ordered and Reviewed  Radiological Study: Ordered and Reviewed  Other:   I have discussed this case with another health care provider.       <> Summary of the Discussion: General surgery.        APC / Resident Notes:   Patient is a 54 year old female presents to the ED for emergent evaluation of post op problem.     Will order labs and imaging. Will order pain medication for symptomatic relief. Will continue to monitor.     Differential diagnoses include, but are not limited to: sepsis, cellulitis, abscess, or electrolyte imbalance.     No leukocytosis. Hemodynamically stable. Thrombocytosis. Lactate WNL. CXR found to have No large focal consolidation.     Appreciate general surgery consult. They will admit patient to their service for observation.     I have discussed and reviewed with my supervising physician.         Attending Attestation:     Physician Attestation Statement for NP/PA:   I have conducted a face to face encounter with this patient in addition to the NP/PA, due to Medical Complexity    Other NP/PA Attestation Additions:    History of Present Illness: 54F with recent R mastectomy for breast ca (7/17/19) with LN biopsy, presenting with purulent drainage from site and erythema starting today.  Denies F/C, but does endorse some mild nausea.   Physical Exam: Appears uncomfortable, right breast wound site with significant tenderness, erythema and induration, 5 mm healing drainage tube scar    Medical Decision  Making: Attending Note:  Physician Attestation Statement: I have personally seen and examined this patient. As the supervising MD I agree with the above history. As the supervising MD I agree with the above PE. As the supervising MD I agree with the above treatment, course, plan, and disposition.              Clinical Impression:       ICD-10-CM ICD-9-CM   1. Wound infection after surgery T81.49XA 998.59         Disposition:   Disposition: Placed in Observation  Condition: Stable                        Peri Otero PA-C  08/11/19 0907       Jennifer Driscoll MD  08/14/19 0751

## 2019-08-11 NOTE — ASSESSMENT & PLAN NOTE
53yo F s/p right mastectomy for breast cancer with erythema, pain, and drainage    - Admit to obs  - Reg diet  - PRN pain meds  - Abx: zosyn (pt progressed on doxycyline and is allergic to bactrim)  - US pending  - Will discuss with Dr. Vaughan (or team) in am

## 2019-08-11 NOTE — SUBJECTIVE & OBJECTIVE
No current facility-administered medications on file prior to encounter.      Current Outpatient Medications on File Prior to Encounter   Medication Sig    butalbital-acetaminophen-caff -40 mg -40 mg Cap Take by mouth once daily.    cyanocobalamin (VITAMIN B-12) 100 MCG tablet Take by mouth.    docusate sodium (COLACE) 100 MG capsule Take 100 mg by mouth 2 (two) times daily.    doxycycline (VIBRAMYCIN) 100 MG Cap Take 1 capsule (100 mg total) by mouth every 12 (twelve) hours. for 10 days    oxybutynin (DITROPAN-XL) 10 MG 24 hr tablet TAKE 1 TABLET BY MOUTH ONCE DAILY    oxyCODONE-acetaminophen (PERCOCET) 5-325 mg per tablet Take 1 tablet by mouth every 6 (six) hours as needed for Pain.    vitamin B complex (SUPER B COMPLEX-B-12 ORAL) Take by mouth.    fluticasone (FLONASE) 50 mcg/actuation nasal spray 1 spray by Each Nare route once daily.    ondansetron (ZOFRAN) 4 MG tablet Take 1 tablet (4 mg total) by mouth every 8 (eight) hours as needed for Nausea.    triamcinolone acetonide 0.1% (KENALOG) 0.1 % cream APPLY AA BID PRN       Review of patient's allergies indicates:   Allergen Reactions    Sulfamethoxazole-trimethoprim Hives and Itching       Past Medical History:   Diagnosis Date    Breast cancer     2004     Past Surgical History:   Procedure Laterality Date    BIOPSY, LYMPH NODE, SENTINEL RIGHT Right 7/17/2019    Performed by Darin Vaughan MD at [facility] OR [facility]    FOOT SURGERY Bilateral     Plantar Fascities    HYSTERECTOMY      KNEE SURGERY Left     Total Knee Replacement    LYMPHADENECTOMY, AXILLARY RIGHT Right 7/17/2019    Performed by [facility]in Vaughan MD at [facility] OR [facility]    MASTECTOMY      MASTECTOMY RIGHT Right 7/17/2019    Performed by [facility]an MD at [facility] OR [facility]    MYOMECTOMY      TONSILLECTOMY       Family History     Problem Relation (Age of Onset)    Cancer Maternal Aunt    Hypertension Father, Mother        Tobacco Use    Smoking status: Never  Smoker    Smokeless tobacco: Never Used   Substance and Sexual Activity    Alcohol use: No     Frequency: Never    Drug use: No    Sexual activity: Not on file     Review of Systems   Constitutional: Positive for activity change, appetite change and fatigue. Negative for chills and fever.   HENT: Negative for trouble swallowing.    Eyes: Negative for visual disturbance.   Respiratory: Negative for shortness of breath.    Cardiovascular: Negative for chest pain and palpitations.   Gastrointestinal: Negative for abdominal pain.   Genitourinary: Negative for difficulty urinating.   Skin: Positive for color change and wound.     Objective:     Vital Signs (Most Recent):  Temp: 98.6 °F (37 °C) (08/10/19 2351)  Pulse: 77 (08/11/19 0033)  Resp: (!) 21 (08/11/19 0033)  BP: (!) 125/94 (08/11/19 0033)  SpO2: 98 % (08/11/19 0033) Vital Signs (24h Range):  Temp:  [98.6 °F (37 °C)-100 °F (37.8 °C)] 98.6 °F (37 °C)  Pulse:  [77-94] 77  Resp:  [18-21] 21  SpO2:  [98 %-100 %] 98 %  BP: (125-146)/(83-94) 125/94     Weight: 99.8 kg (220 lb)  Body mass index is 33.45 kg/m².    Physical Exam   Constitutional: She appears well-developed.   Cardiovascular: Normal rate and regular rhythm.   Pulmonary/Chest: Effort normal.       Abdominal: Soft.   Neurological: She is alert.   Skin: There is erythema.       Significant Labs:  CBC:   Recent Labs   Lab 08/10/19  2334   WBC 9.37   RBC 3.96*   HGB 11.6*   HCT 36.4*   *   MCV 92   MCH 29.3   MCHC 31.9*     CMP:   Recent Labs   Lab 08/10/19  2334      CALCIUM 9.5   ALBUMIN 3.5   PROT 7.2      K 3.8   CO2 23      BUN 14   CREATININE 0.8   ALKPHOS 82   ALT 16   AST 15   BILITOT 0.3       Significant Diagnostics:  US pending

## 2019-08-11 NOTE — PROGRESS NOTES
[facility]  General Surgery  Progress Note    Subjective:     History of Present Illness:  Ms. Selby is a 53yo F s/p right mastectomy 7/17/19 for breast cancer. She presents to the ED with pain, erythema, and drainage from her incision site. She states she has had severe pain since surgery that has not changed. It is affecting her eating and sleep. The drains were removed on 8/8/19 and she was started on doxycycline for a fluid collection around the incision. The morning of 8/10/19 she noticed drainage from her incision. This continued and increased throughout the day. The fluid is thin slightly bloody and the daughter said it was purulent and foul smelling. She also noticed the are was hot and red.    Post-Op Info:  * No surgery found *         Interval History: No issues overnight  AFVSS  Small amount serous drainage from medial aspect of incision    Medications:  Continuous Infusions:  Scheduled Meds:   clindamycin (CLEOCIN) IVPB  600 mg Intravenous Q8H    docusate sodium  100 mg Oral BID    oxybutynin  10 mg Oral Daily     PRN Meds:acetaminophen, HYDROmorphone, ondansetron, oxyCODONE-acetaminophen, oxyCODONE-acetaminophen, sodium chloride 0.9%     Review of patient's allergies indicates:   Allergen Reactions    Sulfamethoxazole-trimethoprim Hives and Itching     Objective:     Vital Signs (Most Recent):  Temp: 96.4 °F (35.8 °C) (08/11/19 0825)  Pulse: (!) 54 (08/11/19 0825)  Resp: 16 (08/11/19 0825)  BP: 108/60 (08/11/19 0825)  SpO2: 98 % (08/11/19 0825) Vital Signs (24h Range):  Temp:  [96.4 °F (35.8 °C)-100 °F (37.8 °C)] 96.4 °F (35.8 °C)  Pulse:  [53-94] 54  Resp:  [16-21] 16  SpO2:  [92 %-100 %] 98 %  BP: ()/(57-94) 108/60     Weight: 99.8 kg (220 lb)  Body mass index is 33.45 kg/m².    Intake/Output - Last 3 Shifts       08/09 0700 - 08/10 0659 08/10 0700 - 08/11 0659 08/11 0700 - 08/12 0659    P.O.  200     Total Intake(mL/kg)  200 (2)     Net  +200            Urine  Occurrence  1 x     Stool Occurrence  0 x     Emesis Occurrence  0 x           Physical Exam   Constitutional: She appears well-developed.   Cardiovascular: Normal rate and regular rhythm.   Pulmonary/Chest: Effort normal.       Abdominal: Soft.   Neurological: She is alert.   Skin: There is erythema.       Significant Labs:  CBC:   Recent Labs   Lab 08/10/19  2334   WBC 9.37   RBC 3.96*   HGB 11.6*   HCT 36.4*   *   MCV 92   MCH 29.3   MCHC 31.9*     BMP:   Recent Labs   Lab 08/11/19  0421         K 3.2*      CO2 24   BUN 12   CREATININE 0.7   CALCIUM 9.1     CMP:   Recent Labs   Lab 08/11/19  0421      CALCIUM 9.1   ALBUMIN 3.0*   PROT 6.1      K 3.2*   CO2 24      BUN 12   CREATININE 0.7   ALKPHOS 67   ALT 14   AST 13   BILITOT 0.4     LFTs:   Recent Labs   Lab 08/11/19  0421   ALT 14   AST 13   ALKPHOS 67   BILITOT 0.4   PROT 6.1   ALBUMIN 3.0*       Significant Diagnostics:  I have reviewed all pertinent imaging results/findings within the past 24 hours.    Assessment/Plan:     * Wound infection after surgery  53yo F s/p right mastectomy for breast cancer with erythema, pain, and drainage. 6cm fluid collection on US likely represents seroma given serous drainage    - Switch to IV Clindamycin  - Reg diet  - PRN pain meds  - Home medications        Saad Cabrera MD  General Surgery  [facility]

## 2019-08-11 NOTE — PLAN OF CARE
Problem: Adult Inpatient Plan of Care  Goal: Plan of Care Review  Outcome: Ongoing (interventions implemented as appropriate)  Pt AAOx4, VSS, No falls noted, fall precautions remain. Pain assessed, pain controlled with PRN regimen, pt comfortable, frequent rounds made for safety and patient care. Call light within reach, bed wheels locked, bed in lowest position, side rails ^x2, safety maintained. NADN, Will continue monitor.

## 2019-08-11 NOTE — ED NOTES
Patient identifiers for Alcira Selby 54 y.o. female checked and correct.  Chief Complaint   Patient presents with    Post-op Problem     Patient reports right mastectomy on July 17.  Red Drainage now coming from incision site. Pt also reports pain and a foul odor     Past Medical History:   Diagnosis Date    Breast cancer     2004     Allergies reported:   Review of patient's allergies indicates:   Allergen Reactions    Sulfamethoxazole-trimethoprim Hives and Itching         LOC: Patient is awake, alert, and aware of environment with an appropriate affect. Patient is oriented x 4 and speaking appropriately.  APPEARANCE: Patient resting comfortably and in no acute distress. Patient is clean and well groomed, patient's clothing is properly fastened.  SKIN: The skin is warm and dry. Patient has normal skin turgor and moist mucus membranes. Right mastectomy; pain 10/10. Reports draining that began this morning. Daughter reports it is warm to the touch, has a foul smell, and is red. Ace bandage wrapped around chest.  MUSKULOSKELETAL: Patient is moving all extremities well, no obvious deformities noted. Pulses intact. Reports weakness.  RESPIRATORY: Airway is open and patent. Respirations are spontaneous and non-labored with normal effort and rate. Reports slight SOB.  CARDIAC: Patient has a normal rate and rhythm. No peripheral edema noted.   ABDOMEN: No distention noted. Soft and non-tender upon palpation. Reports nausea.  NEUROLOGICAL: PERRL. Facial expression is symmetrical. Hand grasps are equal bilaterally. Normal sensation in all extremities when touched with finger. Reports feeling dizzy and having a headache.

## 2019-08-11 NOTE — ED NOTES
Pt presents to ED with c/o pain, inflammation ad purulent drainage to right breast incision. Pt had mastectomy to right breast and recently on 8/8 got LINDA drains removed. Pt states that when she woke up this morning her gauze that was in place was completely soaked with blood and purulent, foul smelling drainage. Pt states that drainage was red and yellow. Pt also states that when she stood up after getting out of bed, she began to have even more drainage from site. Pt denies fever/chills @ home. Pt states that she has been very weak for the past week and hasn't gotten out of bed much.  Pt complains of pain upon palpation of right breast. Pt AAOx4 and ambulatory.

## 2019-08-11 NOTE — PLAN OF CARE
Problem: Adult Inpatient Plan of Care  Goal: Plan of Care Review  Outcome: Ongoing (interventions implemented as appropriate)  Plan of care reviewed and updated. Pt AA+O. Pt's pain is managed with the medication ordered at this time. Pt's VS are as charted.  No falls this shift. Pt is oriented to room and call system. Will continue to [facility].

## 2019-08-11 NOTE — ASSESSMENT & PLAN NOTE
55yo F s/p right mastectomy for breast cancer with erythema, pain, and drainage. 6cm fluid collection on US likely represents seroma given serous drainage    - Switch to IV Clindamycin  - Reg diet  - PRN pain meds  - Home medications

## 2019-08-11 NOTE — HPI
Ms. Selby is a 53yo F s/p right mastectomy 7/17/19 for breast cancer. She presents to the ED with pain, erythema, and drainage from her incision site. She states she has had severe pain since surgery that has not changed. It is affecting her eating and sleep. The drains were removed on 8/8/19 and she was started on doxycycline for a fluid collection around the incision. The morning of 8/10/19 she noticed drainage from her incision. This continued and increased throughout the day. The fluid is thin slightly bloody and the daughter said it was purulent and foul smelling. She also noticed the are was hot and red.

## 2019-08-11 NOTE — H&P
[facility]  General Surgery  History & Physical    Patient Name: Alcira Selby  MRN: [id number]  Admission Date: 8/10/2019  Attending Physician: Jennifer Driscoll MD   Primary Care Provider: Primary Doctor No    Patient information was obtained from patient, past medical records and ER records.     Subjective:     Chief Complaint/Reason for Admission: Wound infection    History of Present Illness: Ms. Selby is a 53yo F s/p right mastectomy 7/17/19 for breast cancer. She presents to the ED with pain, erythema, and drainage from her incision site. She states she has had severe pain since surgery that has not changed. It is affecting her eating and sleep. The drains were removed on 8/8/19 and she was started on doxycycline for a fluid collection around the incision. The morning of 8/10/19 she noticed drainage from her incision. This continued and increased throughout the day. The fluid is thin slightly bloody and the daughter said it was purulent and foul smelling. She also noticed the are was hot and red.    No current facility-administered medications on file prior to encounter.      Current Outpatient Medications on File Prior to Encounter   Medication Sig    butalbital-acetaminophen-caff -40 mg -40 mg Cap Take by mouth once daily.    cyanocobalamin (VITAMIN B-12) 100 MCG tablet Take by mouth.    docusate sodium (COLACE) 100 MG capsule Take 100 mg by mouth 2 (two) times daily.    doxycycline (VIBRAMYCIN) 100 MG Cap Take 1 capsule (100 mg total) by mouth every 12 (twelve) hours. for 10 days    oxybutynin (DITROPAN-XL) 10 MG 24 hr tablet TAKE 1 TABLET BY MOUTH ONCE DAILY    oxyCODONE-acetaminophen (PERCOCET) 5-325 mg per tablet Take 1 tablet by mouth every 6 (six) hours as needed for Pain.    vitamin B complex (SUPER B COMPLEX-B-12 ORAL) Take by mouth.    fluticasone (FLONASE) 50 mcg/actuation nasal spray 1 spray by Each Nare route once daily.    ondansetron (ZOFRAN) 4 MG  tablet Take 1 tablet (4 mg total) by mouth every 8 (eight) hours as needed for Nausea.    triamcinolone acetonide 0.1% (KENALOG) 0.1 % cream APPLY AA BID PRN       Review of patient's allergies indicates:   Allergen Reactions    Sulfamethoxazole-trimethoprim Hives and Itching       Past Medical History:   Diagnosis Date    Breast cancer     2004     Past Surgical History:   Procedure Laterality Date    BIOPSY, LYMPH NODE, SENTINEL RIGHT Right 7/17/2019    [facility]    FOOT SURGERY Bilateral     Plantar Fascities    HYSTERECTOMY      KNEE SURGERY Left     Total Knee Replacement    LYMPHADENECTOMY, AXILLARY RIGHT Right 7/17/2019    [facility]    MASTECTOMY      MASTECTOMY RIGHT Right 7/17/2019    Per[facility] at [facility] OR [address] [place] [address]    MYOMECTOMY      TONSILLECTOMY       Family History     Problem Relation (Age of Onset)    Cancer Maternal Aunt    Hypertension Father, Mother        Tobacco Use    Smoking status: Never Smoker    Smokeless tobacco: Never Used   Substance and Sexual Activity    Alcohol use: No     Frequency: Never    Drug use: No    Sexual activity: Not on file     Review of Systems   Constitutional: Positive for activity change, appetite change and fatigue. Negative for chills and fever.   HENT: Negative for trouble swallowing.    Eyes: Negative for visual disturbance.   Respiratory: Negative for shortness of breath.    Cardiovascular: Negative for chest pain and palpitations.   Gastrointestinal: Negative for abdominal pain.   Genitourinary: Negative for difficulty urinating.   Skin: Positive for color change and wound.     Objective:     Vital Signs (Most Recent):  Temp: 98.6 °F (37 °C) (08/10/19 2351)  Pulse: 77 (08/11/19 0033)  Resp: (!) 21 (08/11/19 0033)  BP: (!) 125/94 (08/11/19 0033)  SpO2: 98 % (08/11/19 0033) Vital Signs (24h Range):  Temp:  [98.6 °F (37 °C)-100 °F (37.8 °C)]  98.6 °F (37 °C)  Pulse:  [77-94] 77  Resp:  [18-21] 21  SpO2:  [98 %-100 %] 98 %  BP: (125-146)/(83-94) 125/94     Weight: 99.8 kg (220 lb)  Body mass index is 33.45 kg/m².    Physical Exam   Constitutional: She appears well-developed.   Cardiovascular: Normal rate and regular rhythm.   Pulmonary/Chest: Effort normal.       Abdominal: Soft.   Neurological: She is alert.   Skin: There is erythema.       Significant Labs:  CBC:   Recent Labs   Lab 08/10/19  2334   WBC 9.37   RBC 3.96*   HGB 11.6*   HCT 36.4*   *   MCV 92   MCH 29.3   MCHC 31.9*     CMP:   Recent Labs   Lab 08/10/19  2334      CALCIUM 9.5   ALBUMIN 3.5   PROT 7.2      K 3.8   CO2 23      BUN 14   CREATININE 0.8   ALKPHOS 82   ALT 16   AST 15   BILITOT 0.3       Significant Diagnostics:  US pending    Assessment/Plan:     * Wound infection after surgery  55yo F s/p right mastectomy for breast cancer with erythema, pain, and drainage    - Admit to obs  - Reg diet  - PRN pain meds  - Abx: zosyn (pt progressed on doxycyline and is allergic to bactrim)  - US pending  - Will discuss with Dr. Vaughan (or team) in am      VTE Risk Mitigation (From admission, onward)        Ordered     Place sequential compression device  Until discontinued      08/11/19 0058     IP VTE HIGH RISK PATIENT  Once      08/11/19 0058          Mavis Beasley MD  General Surgery  [facility]

## 2019-08-12 ENCOUNTER — TUMOR BOARD CONFERENCE (OUTPATIENT)
Dept: SURGERY | Facility: CLINIC | Age: 54
End: 2019-08-12

## 2019-08-12 ENCOUNTER — ANESTHESIA EVENT (OUTPATIENT)
Dept: SURGERY | Facility: HOSPITAL | Age: 54
DRG: 863 | End: 2019-08-12
Payer: COMMERCIAL

## 2019-08-12 LAB
ANION GAP SERPL CALC-SCNC: 7 MMOL/L (ref 8–16)
BASOPHILS # BLD AUTO: 0.06 K/UL (ref 0–0.2)
BASOPHILS NFR BLD: 1.1 % (ref 0–1.9)
BUN SERPL-MCNC: 13 MG/DL (ref 6–20)
CALCIUM SERPL-MCNC: 9.3 MG/DL (ref 8.7–10.5)
CHLORIDE SERPL-SCNC: 104 MMOL/L (ref 95–110)
CO2 SERPL-SCNC: 28 MMOL/L (ref 23–29)
CREAT SERPL-MCNC: 0.8 MG/DL (ref 0.5–1.4)
DIFFERENTIAL METHOD: ABNORMAL
EOSINOPHIL # BLD AUTO: 0.1 K/UL (ref 0–0.5)
EOSINOPHIL NFR BLD: 2.5 % (ref 0–8)
ERYTHROCYTE [DISTWIDTH] IN BLOOD BY AUTOMATED COUNT: 13 % (ref 11.5–14.5)
EST. GFR  (AFRICAN AMERICAN): >60 ML/MIN/1.73 M^2
EST. GFR  (NON AFRICAN AMERICAN): >60 ML/MIN/1.73 M^2
GLUCOSE SERPL-MCNC: 101 MG/DL (ref 70–110)
HCT VFR BLD AUTO: 34.6 % (ref 37–48.5)
HGB BLD-MCNC: 11 G/DL (ref 12–16)
IMM GRANULOCYTES # BLD AUTO: 0.01 K/UL (ref 0–0.04)
IMM GRANULOCYTES NFR BLD AUTO: 0.2 % (ref 0–0.5)
LYMPHOCYTES # BLD AUTO: 2.3 K/UL (ref 1–4.8)
LYMPHOCYTES NFR BLD: 42.7 % (ref 18–48)
MCH RBC QN AUTO: 29.2 PG (ref 27–31)
MCHC RBC AUTO-ENTMCNC: 31.8 G/DL (ref 32–36)
MCV RBC AUTO: 92 FL (ref 82–98)
MONOCYTES # BLD AUTO: 0.4 K/UL (ref 0.3–1)
MONOCYTES NFR BLD: 8.1 % (ref 4–15)
NEUTROPHILS # BLD AUTO: 2.4 K/UL (ref 1.8–7.7)
NEUTROPHILS NFR BLD: 45.4 % (ref 38–73)
NRBC BLD-RTO: 0 /100 WBC
PLATELET # BLD AUTO: 430 K/UL (ref 150–350)
PMV BLD AUTO: 9.3 FL (ref 9.2–12.9)
POTASSIUM SERPL-SCNC: 3.3 MMOL/L (ref 3.5–5.1)
RBC # BLD AUTO: 3.77 M/UL (ref 4–5.4)
SODIUM SERPL-SCNC: 139 MMOL/L (ref 136–145)
WBC # BLD AUTO: 5.29 K/UL (ref 3.9–12.7)

## 2019-08-12 PROCEDURE — G0378 HOSPITAL OBSERVATION PER HR: HCPCS

## 2019-08-12 PROCEDURE — 94761 N-INVAS EAR/PLS OXIMETRY MLT: CPT

## 2019-08-12 PROCEDURE — 85025 COMPLETE CBC W/AUTO DIFF WBC: CPT

## 2019-08-12 PROCEDURE — S0077 INJECTION, CLINDAMYCIN PHOSP: HCPCS | Performed by: STUDENT IN AN ORGANIZED HEALTH CARE EDUCATION/TRAINING PROGRAM

## 2019-08-12 PROCEDURE — 80048 BASIC METABOLIC PNL TOTAL CA: CPT

## 2019-08-12 PROCEDURE — 63600175 PHARM REV CODE 636 W HCPCS: Performed by: STUDENT IN AN ORGANIZED HEALTH CARE EDUCATION/TRAINING PROGRAM

## 2019-08-12 PROCEDURE — 25000003 PHARM REV CODE 250: Performed by: STUDENT IN AN ORGANIZED HEALTH CARE EDUCATION/TRAINING PROGRAM

## 2019-08-12 PROCEDURE — 36415 COLL VENOUS BLD VENIPUNCTURE: CPT

## 2019-08-12 RX ORDER — OXYCODONE HYDROCHLORIDE 5 MG/1
5 TABLET ORAL EVERY 4 HOURS PRN
Status: DISCONTINUED | OUTPATIENT
Start: 2019-08-12 | End: 2019-08-15 | Stop reason: HOSPADM

## 2019-08-12 RX ORDER — ACETAMINOPHEN 500 MG
1000 TABLET ORAL EVERY 6 HOURS
Status: DISCONTINUED | OUTPATIENT
Start: 2019-08-12 | End: 2019-08-15 | Stop reason: HOSPADM

## 2019-08-12 RX ORDER — GABAPENTIN 100 MG/1
100 CAPSULE ORAL 3 TIMES DAILY
Status: DISCONTINUED | OUTPATIENT
Start: 2019-08-12 | End: 2019-08-14

## 2019-08-12 RX ORDER — OXYCODONE HYDROCHLORIDE 10 MG/1
10 TABLET ORAL EVERY 4 HOURS PRN
Status: DISCONTINUED | OUTPATIENT
Start: 2019-08-12 | End: 2019-08-15 | Stop reason: HOSPADM

## 2019-08-12 RX ADMIN — OXYCODONE HYDROCHLORIDE AND ACETAMINOPHEN 1 TABLET: 10; 325 TABLET ORAL at 12:08

## 2019-08-12 RX ADMIN — PROMETHAZINE HYDROCHLORIDE 12.5 MG: 25 INJECTION INTRAMUSCULAR; INTRAVENOUS at 12:08

## 2019-08-12 RX ADMIN — ACETAMINOPHEN 1000 MG: 500 TABLET ORAL at 05:08

## 2019-08-12 RX ADMIN — ACETAMINOPHEN 1000 MG: 500 TABLET ORAL at 10:08

## 2019-08-12 RX ADMIN — CLINDAMYCIN IN 5 PERCENT DEXTROSE 600 MG: 12 INJECTION, SOLUTION INTRAVENOUS at 08:08

## 2019-08-12 RX ADMIN — OXYCODONE HYDROCHLORIDE AND ACETAMINOPHEN 1 TABLET: 10; 325 TABLET ORAL at 05:08

## 2019-08-12 RX ADMIN — OXYCODONE HYDROCHLORIDE AND ACETAMINOPHEN 1 TABLET: 5; 325 TABLET ORAL at 09:08

## 2019-08-12 RX ADMIN — OXYCODONE HYDROCHLORIDE AND ACETAMINOPHEN 1 TABLET: 10; 325 TABLET ORAL at 02:08

## 2019-08-12 RX ADMIN — OXYCODONE HYDROCHLORIDE 10 MG: 10 TABLET ORAL at 08:08

## 2019-08-12 RX ADMIN — DOCUSATE SODIUM 100 MG: 100 CAPSULE, LIQUID FILLED ORAL at 09:08

## 2019-08-12 RX ADMIN — DOCUSATE SODIUM 100 MG: 100 CAPSULE, LIQUID FILLED ORAL at 08:08

## 2019-08-12 RX ADMIN — OXYBUTYNIN CHLORIDE 10 MG: 10 TABLET, EXTENDED RELEASE ORAL at 09:08

## 2019-08-12 RX ADMIN — ENOXAPARIN SODIUM 40 MG: 100 INJECTION SUBCUTANEOUS at 08:08

## 2019-08-12 RX ADMIN — GABAPENTIN 100 MG: 100 CAPSULE ORAL at 08:08

## 2019-08-12 NOTE — PROGRESS NOTES
[facility]  General Surgery  Progress Note    Subjective:     History of Present Illness:  Ms. Selby is a 55yo F s/p right mastectomy 7/17/19 for breast cancer. She presents to the ED with pain, erythema, and drainage from her incision site. She states she has had severe pain since surgery that has not changed. It is affecting her eating and sleep. The drains were removed on 8/8/19 and she was started on doxycycline for a fluid collection around the incision. The morning of 8/10/19 she noticed drainage from her incision. This continued and increased throughout the day. The fluid is thin slightly bloody and the daughter said it was purulent and foul smelling. She also noticed the are was hot and red.    Post-Op Info:  * No surgery found *         Interval History: Patient had emesis x 2 yesterday, feeling better this AM, no BM in about a week (normal for her), afebrile, ambulating, pain well controlled    Medications:  Continuous Infusions:  Scheduled Meds:   clindamycin (CLEOCIN) IVPB  600 mg Intravenous Q8H    docusate sodium  100 mg Oral BID    enoxaparin  40 mg Subcutaneous Daily    oxybutynin  10 mg Oral Daily     PRN Meds:acetaminophen, HYDROmorphone, ondansetron, oxyCODONE-acetaminophen, oxyCODONE-acetaminophen, promethazine (PHENERGAN) IVPB, promethazine, sodium chloride 0.9%     Review of patient's allergies indicates:   Allergen Reactions    Sulfamethoxazole-trimethoprim Hives and Itching     Objective:     Vital Signs (Most Recent):  Temp: 97.1 °F (36.2 °C) (08/12/19 0415)  Pulse: (!) 54 (08/12/19 0415)  Resp: 16 (08/12/19 0415)  BP: 122/75 (08/12/19 0415)  SpO2: 98 % (08/12/19 0415) Vital Signs (24h Range):  Temp:  [96.4 °F (35.8 °C)-98.3 °F (36.8 °C)] 97.1 °F (36.2 °C)  Pulse:  [50-81] 54  Resp:  [16] 16  SpO2:  [97 %-99 %] 98 %  BP: (108-122)/(60-75) 122/75     Weight: 99.8 kg (220 lb)  Body mass index is 33.45 kg/m².    Intake/Output - Last 3 Shifts       08/10 0700 - 08/11  0659 08/11 0700 - 08/12 0659 08/12 0700 - 08/13 0659    P.O. 200 1070     Total Intake(mL/kg) 200 (2) 1070 (10.7)     Urine (mL/kg/hr)  0 (0)     Emesis/NG output  400     Total Output  400     Net +200 +670            Urine Occurrence 1 x 4 x     Stool Occurrence 0 x 0 x     Emesis Occurrence 0 x 2 x           Physical Exam   Constitutional: She appears well-developed.   Cardiovascular: Normal rate and regular rhythm.   Pulmonary/Chest: Effort normal.       Abdominal: Soft.   Neurological: She is alert.   Skin: There is erythema.       Significant Labs:  CBC:   Recent Labs   Lab 08/10/19  2334   WBC 9.37   RBC 3.96*   HGB 11.6*   HCT 36.4*   *   MCV 92   MCH 29.3   MCHC 31.9*     CMP:   Recent Labs   Lab 08/11/19  0421      CALCIUM 9.1   ALBUMIN 3.0*   PROT 6.1      K 3.2*   CO2 24      BUN 12   CREATININE 0.7   ALKPHOS 67   ALT 14   AST 13   BILITOT 0.4       Significant Diagnostics:  I have reviewed all pertinent imaging results/findings within the past 24 hours.    Assessment/Plan:     * Wound infection after surgery  53yo F s/p right mastectomy for breast cancer with erythema, pain, and drainage. 6cm fluid collection on US likely represents seroma given serous drainage    - Switch to IV Clindamycin  - Reg diet  - PRN pain meds  - Home medications  - Will plan for drainage today        Adalid Chamberlain MD  General Surgery  [facility]

## 2019-08-12 NOTE — ANESTHESIA PREPROCEDURE EVALUATION
[facility]  Anesthesia Pre-Operative Evaluation         Patient Name: Alcira Selby  YOB: 1965  MRN: [id number]    SUBJECTIVE:     Pre-operative evaluation for Procedure(s) (LRB):  Incision and Drainage - right chest wall (Right)     08/12/2019    Alcira Sebly is a 53 y.o. female w/ a significant PMHx of Rt breast DCIS in 2004 s/p lumpectomy and XRT with re-excision of close margins that was negative, s/p completion of tamoxifen 2009. She presented for right mastectomy after diagnosis with R breast DCIS, ER/CO +, nuclear grade III. Right mastectomy on 07/07/19. Presented to ED on 08/12/19 with likely infection of surgical site. On pip/tazo.    *Patient endorses recent Nausea/vomiting on evening of 8/11/19. Still endorses mild nausea.     Patient now presents for the above procedure(s).      LDA:        Peripheral IV - Single Lumen 08/11/19 0000 22 G Left Antecubital (Active)   Site Assessment Clean;Dry;Intact;No redness;No swelling 8/11/2019  8:00 PM   Line Status Saline locked;Flushed 8/11/2019  8:00 PM   Dressing Status Clean;Dry;Intact 8/11/2019  8:00 PM   Dressing Intervention Dressing reinforced 8/11/2019  8:00 PM   Number of days: 1       Prev airway:   Present Prior to Hospital Arrival?: No; Placement Date: 07/17/19; Placement Time: 1113; Method of Intubation: Direct laryngoscopy; Inserted by: Anesthesia Resident; Airway Device: Endotracheal Tube; Mask Ventilation: Easy; Intubated: Postinduction; Blade: Joseph #2; Style: Cuffed; Cuff Inflation: Minimal occlusive pressure; Placement Verified By: Auscultation, Capnometry, ETT Condensation; Grade: Grade I; Complicating Factors: None; Intubation Findings: Positive EtCO2, Bilateral breath sounds;  Depth of Insertion: 22; Securment: Lips; Complications: None; Breath Sounds: Equal Bilateral; Insertion Attempts: 1; Removal Date: 07/17/19;  Removal Time: 1347    Drips: None documented.      Patient Active Problem List   Diagnosis     Acute pain of left knee    Difficulty walking    Malignant neoplasm of right female breast    Pre-op testing    Ductal carcinoma in situ (DCIS) of right breast    At risk for lymphedema    Stiffness due to immobility    Pain of right upper extremity    Thyroid goiter    DCIS (ductal carcinoma in situ) of breast    Wound infection after surgery       Review of patient's allergies indicates:   Allergen Reactions    Sulfamethoxazole-trimethoprim Hives and Itching       Current Inpatient Medications:   clindamycin (CLEOCIN) IVPB  600 mg Intravenous Q8H    docusate sodium  100 mg Oral BID    enoxaparin  40 mg Subcutaneous Daily    oxybutynin  10 mg Oral Daily       No current facility-administered medications on file prior to encounter.      Current Outpatient Medications on File Prior to Encounter   Medication Sig Dispense Refill    butalbital-acetaminophen-caff -40 mg -40 mg Cap Take by mouth once daily.      cyanocobalamin (VITAMIN B-12) 100 MCG tablet Take by mouth.      docusate sodium (COLACE) 100 MG capsule Take 100 mg by mouth 2 (two) times daily.      doxycycline (VIBRAMYCIN) 100 MG Cap Take 1 capsule (100 mg total) by mouth every 12 (twelve) hours. for 10 days 20 capsule 0    oxybutynin (DITROPAN-XL) 10 MG 24 hr tablet TAKE 1 TABLET BY MOUTH ONCE DAILY 30 tablet 0    oxyCODONE-acetaminophen (PERCOCET) 5-325 mg per tablet Take 1 tablet by mouth every 6 (six) hours as needed for Pain. 20 tablet 0    vitamin B complex (SUPER B COMPLEX-B-12 ORAL) Take by mouth.      fluticasone (FLONASE) 50 mcg/actuation nasal spray 1 spray by Each Nare route once daily.      ondansetron (ZOFRAN) 4 MG tablet Take 1 tablet (4 mg total) by mouth every 8 (eight) hours as needed for Nausea. 10 tablet 3    triamcinolone acetonide 0.1% (KENALOG) 0.1 % cream APPLY AA BID PRN  0       Past Surgical History:   Procedure Laterality Date    BIOPSY, LYMPH NODE, SENTINEL RIGHT Right 7/17/2019     Performed [facility] at [facility] OR 2ND FLR    FOOT SURGERY Bilateral     Plantar Fascities    HYSTERECTOMY      KNEE SURGERY Left     Total Knee Replacement    LYMPHADENECTOMY, AXILLARY RIGHT Right 7/17/2019    Performed by [facility] at [facility] OR 2ND FLR    MASTECTOMY      MASTECTOMY RIGHT Right 7/17/2019    Performed by [facility] at [facility] OR 2ND FLR    MYOMECTOMY      TONSILLECTOMY         Social History     Socioeconomic History    Marital status:      Spouse name: Not on file    Number of children: Not on file    Years of education: Not on file    Highest education level: Not on file   Occupational History    Not on file   Social Needs    Financial resource strain: Not on file    Food insecurity:     Worry: Not on file     Inability: Not on file    Transportation needs:     Medical: Not on file     Non-medical: Not on file   Tobacco Use    Smoking status: Never Smoker    Smokeless tobacco: Never Used   Substance and Sexual Activity    Alcohol use: No     Frequency: Never    Drug use: No    Sexual activity: Not on file   Lifestyle    Physical activity:     Days per week: Not on file     Minutes per session: Not on file    Stress: Not on file   Relationships    Social connections:     Talks on phone: Not on file     Gets together: Not on file     Attends Confucianist service: Not on file     Active member of club or organization: Not on file     Attends meetings of clubs or organizations: Not on file     Relationship status: Not on file   Other Topics Concern    Not on file   Social History Narrative    Not on file       OBJECTIVE:     Vital Signs Range (Last 24H):  Temp:  [36.2 °C (97.1 °F)-36.9 °C (98.4 °F)]   Pulse:  [50-81]   Resp:  [16-18]   BP: (108-129)/(64-76)   SpO2:  [97 %-98 %]       Significant Labs:  Lab Results   Component Value Date    WBC 9.37 08/10/2019    HGB 11.6 (L) 08/10/2019    HCT 36.4 (L) 08/10/2019     (H) 08/10/2019     CHOL 190 05/13/2005    TRIG 89 05/13/2005    HDL 52.0 05/13/2005    ALT 14 08/11/2019    AST 13 08/11/2019     08/11/2019    K 3.2 (L) 08/11/2019     08/11/2019    CREATININE 0.7 08/11/2019    BUN 12 08/11/2019    CO2 24 08/11/2019    TSH 0.769 07/11/2019    INR 0.9 07/19/2019       Diagnostic Studies: No relevant studies.    EKG:   Results for orders placed or performed in visit on 08/10/19   EKG 12-lead    Collection Time: 08/10/19 11:24 PM    Narrative    Test Reason :     Vent. Rate : 079 BPM     Atrial Rate : 079 BPM     P-R Int : 162 ms          QRS Dur : 076 ms      QT Int : 402 ms       P-R-T Axes : 057 024 014 degrees     QTc Int : 460 ms    Normal sinus rhythm  Nonspecific T wave abnormality  Abnormal ECG  When compared with ECG of 24-JUN-2019 09:56,  No significant change was found  Confirmed by Kenya Roth MD ([id number]) on 8/11/2019 11:31:41 AM    Referred By: [email or web address]   SELF           Confirmed By:Kenya Roth MD       ECHOCARDIOGRAM:  No results found for this or any previous visit.        ASSESSMENT/PLAN:                                                                                                               Anesthesia Evaluation    I have reviewed the Patient Summary Reports.     I have reviewed the Medications.     Review of Systems  Anesthesia Hx:  No problems with previous Anesthesia Denies Hx of Anesthetic complications  History of prior surgery of interest to airway management or planning: Previous anesthesia: General Denies Family Hx of Anesthesia complications.   Denies Personal Hx of Anesthesia complications.   Social:  Non-Smoker, No Alcohol Use    Hematology/Oncology:         -- Denies Anemia: Current/Recent Cancer. Breast right   EENT/Dental:EENT/Dental Normal   Cardiovascular:  Cardiovascular Normal Exercise tolerance: good  Denies Hypertension.   Denies Angina. ECG has been reviewed.    Pulmonary:  Pulmonary Normal  Denies Asthma.    Renal/:  Renal/ Normal  Stress  incontenance   Hepatic/GI:  Hepatic/GI Normal  Denies GERD. Recent Nausea/Vomiting    Musculoskeletal:   Arthritis     Neurological:  Neurology Normal  Denies CVA.    Endocrine:  Endocrine Normal    Psych:  Psychiatric Normal           Physical Exam  General:  Well nourished    Airway/Jaw/Neck:  Airway Findings: Mouth Opening: Normal Tongue: Normal  General Airway Assessment: Adult  Mallampati: II  TM Distance: Normal, at least 6 cm  Jaw/Neck Findings:     Neck ROM: Normal ROM      Dental:  Dental Findings: In tact   Chest/Lungs:  Chest/Lungs Findings: Clear to auscultation     Heart/Vascular:  Heart Findings: Rate: Normal  Rhythm: Regular Rhythm        Mental Status:  Mental Status Findings:  Cooperative, Alert and Oriented         Anesthesia Plan  Type of Anesthesia, risks & benefits discussed:  Anesthesia Type:  general  Patient's Preference:   Intra-op Monitoring Plan: standard ASA monitors  Intra-op Monitoring Plan Comments:   Post Op Pain Control Plan: multimodal analgesia and IV/PO Opioids PRN  Post Op Pain Control Plan Comments:   Induction:   IV  Beta Blocker:  Patient is not currently on a Beta-Blocker (No further documentation required).       Informed Consent: Patient understands risks and agrees with Anesthesia plan.  Questions answered. Anesthesia consent signed with patient.  ASA Score: 3     Day of Surgery Review of History & Physical:    H&P update referred to the surgeon.         Ready For Surgery From Anesthesia Perspective.

## 2019-08-12 NOTE — SUBJECTIVE & OBJECTIVE
Interval History: Patient had emesis x 2 yesterday, feeling better this AM, no BM in about a week (normal for her), afebrile, ambulating, pain well controlled    Medications:  Continuous Infusions:  Scheduled Meds:   clindamycin (CLEOCIN) IVPB  600 mg Intravenous Q8H    docusate sodium  100 mg Oral BID    enoxaparin  40 mg Subcutaneous Daily    oxybutynin  10 mg Oral Daily     PRN Meds:acetaminophen, HYDROmorphone, ondansetron, oxyCODONE-acetaminophen, oxyCODONE-acetaminophen, promethazine (PHENERGAN) IVPB, promethazine, sodium chloride 0.9%     Review of patient's allergies indicates:   Allergen Reactions    Sulfamethoxazole-trimethoprim Hives and Itching     Objective:     Vital Signs (Most Recent):  Temp: 97.1 °F (36.2 °C) (08/12/19 0415)  Pulse: (!) 54 (08/12/19 0415)  Resp: 16 (08/12/19 0415)  BP: 122/75 (08/12/19 0415)  SpO2: 98 % (08/12/19 0415) Vital Signs (24h Range):  Temp:  [96.4 °F (35.8 °C)-98.3 °F (36.8 °C)] 97.1 °F (36.2 °C)  Pulse:  [50-81] 54  Resp:  [16] 16  SpO2:  [97 %-99 %] 98 %  BP: (108-122)/(60-75) 122/75     Weight: 99.8 kg (220 lb)  Body mass index is 33.45 kg/m².    Intake/Output - Last 3 Shifts       08/10 0700 - 08/11 0659 08/11 0700 - 08/12 0659 08/12 0700 - 08/13 0659    P.O. 200 1070     Total Intake(mL/kg) 200 (2) 1070 (10.7)     Urine (mL/kg/hr)  0 (0)     Emesis/NG output  400     Total Output  400     Net +200 +670            Urine Occurrence 1 x 4 x     Stool Occurrence 0 x 0 x     Emesis Occurrence 0 x 2 x           Physical Exam   Constitutional: She appears well-developed.   Cardiovascular: Normal rate and regular rhythm.   Pulmonary/Chest: Effort normal.       Abdominal: Soft.   Neurological: She is alert.   Skin: There is erythema.       Significant Labs:  CBC:   Recent Labs   Lab 08/10/19  2334   WBC 9.37   RBC 3.96*   HGB 11.6*   HCT 36.4*   *   MCV 92   MCH 29.3   MCHC 31.9*     CMP:   Recent Labs   Lab 08/11/19  0421      CALCIUM 9.1   ALBUMIN 3.0*    PROT 6.1      K 3.2*   CO2 24      BUN 12   CREATININE 0.7   ALKPHOS 67   ALT 14   AST 13   BILITOT 0.4       Significant Diagnostics:  I have reviewed all pertinent imaging results/findings within the past 24 hours.

## 2019-08-12 NOTE — PROGRESS NOTES
Notified Dr. Guillen of patient vomiting after taking po dose of phenergan. Requesting IV phenergan. New orders placed. Will continue to monitor.

## 2019-08-12 NOTE — ASSESSMENT & PLAN NOTE
53yo F s/p right mastectomy for breast cancer with erythema, pain, and drainage. 6cm fluid collection on US likely represents seroma given serous drainage    - Switch to IV Clindamycin  - Reg diet  - PRN pain meds  - Home medications  - Will plan for drainage today

## 2019-08-13 ENCOUNTER — TELEPHONE (OUTPATIENT)
Dept: PLASTIC SURGERY | Facility: CLINIC | Age: 54
End: 2019-08-13

## 2019-08-13 ENCOUNTER — PATIENT MESSAGE (OUTPATIENT)
Dept: HEMATOLOGY/ONCOLOGY | Facility: CLINIC | Age: 54
End: 2019-08-13

## 2019-08-13 ENCOUNTER — ANESTHESIA (OUTPATIENT)
Dept: SURGERY | Facility: HOSPITAL | Age: 54
DRG: 863 | End: 2019-08-13
Payer: COMMERCIAL

## 2019-08-13 LAB
ANION GAP SERPL CALC-SCNC: 11 MMOL/L (ref 8–16)
BASOPHILS # BLD AUTO: 0.06 K/UL (ref 0–0.2)
BASOPHILS NFR BLD: 1.1 % (ref 0–1.9)
BUN SERPL-MCNC: 14 MG/DL (ref 6–20)
CALCIUM SERPL-MCNC: 8.8 MG/DL (ref 8.7–10.5)
CHLORIDE SERPL-SCNC: 105 MMOL/L (ref 95–110)
CO2 SERPL-SCNC: 24 MMOL/L (ref 23–29)
CREAT SERPL-MCNC: 0.8 MG/DL (ref 0.5–1.4)
DIFFERENTIAL METHOD: ABNORMAL
EOSINOPHIL # BLD AUTO: 0.2 K/UL (ref 0–0.5)
EOSINOPHIL NFR BLD: 2.7 % (ref 0–8)
ERYTHROCYTE [DISTWIDTH] IN BLOOD BY AUTOMATED COUNT: 12.9 % (ref 11.5–14.5)
EST. GFR  (AFRICAN AMERICAN): >60 ML/MIN/1.73 M^2
EST. GFR  (NON AFRICAN AMERICAN): >60 ML/MIN/1.73 M^2
GLUCOSE SERPL-MCNC: 88 MG/DL (ref 70–110)
GRAM STN SPEC: NORMAL
GRAM STN SPEC: NORMAL
HCT VFR BLD AUTO: 33.1 % (ref 37–48.5)
HGB BLD-MCNC: 10.5 G/DL (ref 12–16)
IMM GRANULOCYTES # BLD AUTO: 0.01 K/UL (ref 0–0.04)
IMM GRANULOCYTES NFR BLD AUTO: 0.2 % (ref 0–0.5)
LYMPHOCYTES # BLD AUTO: 3.5 K/UL (ref 1–4.8)
LYMPHOCYTES NFR BLD: 61.8 % (ref 18–48)
MCH RBC QN AUTO: 29.3 PG (ref 27–31)
MCHC RBC AUTO-ENTMCNC: 31.7 G/DL (ref 32–36)
MCV RBC AUTO: 93 FL (ref 82–98)
MONOCYTES # BLD AUTO: 0.4 K/UL (ref 0.3–1)
MONOCYTES NFR BLD: 6.2 % (ref 4–15)
NEUTROPHILS # BLD AUTO: 1.6 K/UL (ref 1.8–7.7)
NEUTROPHILS NFR BLD: 28 % (ref 38–73)
NRBC BLD-RTO: 0 /100 WBC
PLATELET # BLD AUTO: 425 K/UL (ref 150–350)
PMV BLD AUTO: 9.6 FL (ref 9.2–12.9)
POTASSIUM SERPL-SCNC: 3.7 MMOL/L (ref 3.5–5.1)
RBC # BLD AUTO: 3.58 M/UL (ref 4–5.4)
SODIUM SERPL-SCNC: 140 MMOL/L (ref 136–145)
WBC # BLD AUTO: 5.66 K/UL (ref 3.9–12.7)

## 2019-08-13 PROCEDURE — 10180 PR COMPLEX DRAINAGE, WOUND: ICD-10-PCS | Mod: 78,,, | Performed by: SURGERY

## 2019-08-13 PROCEDURE — 71000033 HC RECOVERY, INTIAL HOUR: Performed by: SURGERY

## 2019-08-13 PROCEDURE — 80048 BASIC METABOLIC PNL TOTAL CA: CPT

## 2019-08-13 PROCEDURE — D9220A PRA ANESTHESIA: ICD-10-PCS | Mod: ,,, | Performed by: ANESTHESIOLOGY

## 2019-08-13 PROCEDURE — 25000003 PHARM REV CODE 250: Performed by: STUDENT IN AN ORGANIZED HEALTH CARE EDUCATION/TRAINING PROGRAM

## 2019-08-13 PROCEDURE — 87205 SMEAR GRAM STAIN: CPT

## 2019-08-13 PROCEDURE — 63600175 PHARM REV CODE 636 W HCPCS: Performed by: STUDENT IN AN ORGANIZED HEALTH CARE EDUCATION/TRAINING PROGRAM

## 2019-08-13 PROCEDURE — 87102 FUNGUS ISOLATION CULTURE: CPT

## 2019-08-13 PROCEDURE — 36000706: Performed by: SURGERY

## 2019-08-13 PROCEDURE — 94761 N-INVAS EAR/PLS OXIMETRY MLT: CPT

## 2019-08-13 PROCEDURE — D9220A PRA ANESTHESIA: Mod: ,,, | Performed by: ANESTHESIOLOGY

## 2019-08-13 PROCEDURE — 63600175 PHARM REV CODE 636 W HCPCS: Performed by: NURSE ANESTHETIST, CERTIFIED REGISTERED

## 2019-08-13 PROCEDURE — C1729 CATH, DRAINAGE: HCPCS | Performed by: SURGERY

## 2019-08-13 PROCEDURE — 71000039 HC RECOVERY, EACH ADD'L HOUR: Performed by: SURGERY

## 2019-08-13 PROCEDURE — 36000705 HC OR TIME LEV I EA ADD 15 MIN: Performed by: SURGERY

## 2019-08-13 PROCEDURE — 25000003 PHARM REV CODE 250: Performed by: NURSE ANESTHETIST, CERTIFIED REGISTERED

## 2019-08-13 PROCEDURE — 63600175 PHARM REV CODE 636 W HCPCS: Performed by: ANESTHESIOLOGY

## 2019-08-13 PROCEDURE — 85025 COMPLETE CBC W/AUTO DIFF WBC: CPT

## 2019-08-13 PROCEDURE — 36000707: Performed by: SURGERY

## 2019-08-13 PROCEDURE — 87077 CULTURE AEROBIC IDENTIFY: CPT

## 2019-08-13 PROCEDURE — 37000009 HC ANESTHESIA EA ADD 15 MINS: Performed by: SURGERY

## 2019-08-13 PROCEDURE — 37000008 HC ANESTHESIA 1ST 15 MINUTES: Performed by: SURGERY

## 2019-08-13 PROCEDURE — 36000704 HC OR TIME LEV I 1ST 15 MIN: Performed by: SURGERY

## 2019-08-13 PROCEDURE — 87070 CULTURE OTHR SPECIMN AEROBIC: CPT

## 2019-08-13 PROCEDURE — 87186 SC STD MICRODIL/AGAR DIL: CPT

## 2019-08-13 PROCEDURE — S0077 INJECTION, CLINDAMYCIN PHOSP: HCPCS | Performed by: STUDENT IN AN ORGANIZED HEALTH CARE EDUCATION/TRAINING PROGRAM

## 2019-08-13 PROCEDURE — S0077 INJECTION, CLINDAMYCIN PHOSP: HCPCS | Performed by: NURSE ANESTHETIST, CERTIFIED REGISTERED

## 2019-08-13 PROCEDURE — 87075 CULTR BACTERIA EXCEPT BLOOD: CPT

## 2019-08-13 PROCEDURE — 10180 I&D COMPLEX PO WOUND INFCTJ: CPT | Mod: 78,,, | Performed by: SURGERY

## 2019-08-13 PROCEDURE — 36415 COLL VENOUS BLD VENIPUNCTURE: CPT

## 2019-08-13 PROCEDURE — 11000001 HC ACUTE MED/SURG PRIVATE ROOM

## 2019-08-13 RX ORDER — ONDANSETRON 2 MG/ML
4 INJECTION INTRAMUSCULAR; INTRAVENOUS ONCE
Status: COMPLETED | OUTPATIENT
Start: 2019-08-13 | End: 2019-08-13

## 2019-08-13 RX ORDER — FENTANYL CITRATE 50 UG/ML
INJECTION, SOLUTION INTRAMUSCULAR; INTRAVENOUS
Status: DISCONTINUED | OUTPATIENT
Start: 2019-08-13 | End: 2019-08-13

## 2019-08-13 RX ORDER — ROCURONIUM BROMIDE 10 MG/ML
INJECTION, SOLUTION INTRAVENOUS
Status: DISCONTINUED | OUTPATIENT
Start: 2019-08-13 | End: 2019-08-13

## 2019-08-13 RX ORDER — NEOSTIGMINE METHYLSULFATE 1 MG/ML
INJECTION, SOLUTION INTRAVENOUS
Status: DISCONTINUED | OUTPATIENT
Start: 2019-08-13 | End: 2019-08-13

## 2019-08-13 RX ORDER — MIDAZOLAM HYDROCHLORIDE 1 MG/ML
INJECTION, SOLUTION INTRAMUSCULAR; INTRAVENOUS
Status: DISCONTINUED | OUTPATIENT
Start: 2019-08-13 | End: 2019-08-13

## 2019-08-13 RX ORDER — SODIUM CHLORIDE 9 MG/ML
INJECTION, SOLUTION INTRAVENOUS CONTINUOUS PRN
Status: DISCONTINUED | OUTPATIENT
Start: 2019-08-13 | End: 2019-08-13

## 2019-08-13 RX ORDER — CLINDAMYCIN PHOSPHATE 900 MG/50ML
INJECTION, SOLUTION INTRAVENOUS
Status: DISCONTINUED | OUTPATIENT
Start: 2019-08-13 | End: 2019-08-13

## 2019-08-13 RX ORDER — PROPOFOL 10 MG/ML
VIAL (ML) INTRAVENOUS
Status: DISCONTINUED | OUTPATIENT
Start: 2019-08-13 | End: 2019-08-13

## 2019-08-13 RX ORDER — GLYCOPYRROLATE 0.2 MG/ML
INJECTION INTRAMUSCULAR; INTRAVENOUS
Status: DISCONTINUED | OUTPATIENT
Start: 2019-08-13 | End: 2019-08-13

## 2019-08-13 RX ORDER — DEXAMETHASONE SODIUM PHOSPHATE 4 MG/ML
INJECTION, SOLUTION INTRA-ARTICULAR; INTRALESIONAL; INTRAMUSCULAR; INTRAVENOUS; SOFT TISSUE
Status: DISCONTINUED | OUTPATIENT
Start: 2019-08-13 | End: 2019-08-13

## 2019-08-13 RX ORDER — LABETALOL HYDROCHLORIDE 5 MG/ML
INJECTION, SOLUTION INTRAVENOUS
Status: DISCONTINUED | OUTPATIENT
Start: 2019-08-13 | End: 2019-08-13

## 2019-08-13 RX ORDER — HYDROMORPHONE HYDROCHLORIDE 1 MG/ML
0.2 INJECTION, SOLUTION INTRAMUSCULAR; INTRAVENOUS; SUBCUTANEOUS EVERY 5 MIN PRN
Status: DISCONTINUED | OUTPATIENT
Start: 2019-08-13 | End: 2019-08-13 | Stop reason: HOSPADM

## 2019-08-13 RX ORDER — FENTANYL CITRATE 50 UG/ML
50 INJECTION, SOLUTION INTRAMUSCULAR; INTRAVENOUS ONCE
Status: COMPLETED | OUTPATIENT
Start: 2019-08-13 | End: 2019-08-13

## 2019-08-13 RX ORDER — SODIUM CHLORIDE 0.9 % (FLUSH) 0.9 %
10 SYRINGE (ML) INJECTION
Status: DISCONTINUED | OUTPATIENT
Start: 2019-08-13 | End: 2019-08-13 | Stop reason: HOSPADM

## 2019-08-13 RX ORDER — KETAMINE HYDROCHLORIDE 10 MG/ML
INJECTION, SOLUTION INTRAMUSCULAR; INTRAVENOUS
Status: DISCONTINUED | OUTPATIENT
Start: 2019-08-13 | End: 2019-08-13

## 2019-08-13 RX ORDER — ONDANSETRON 2 MG/ML
INJECTION INTRAMUSCULAR; INTRAVENOUS
Status: DISCONTINUED | OUTPATIENT
Start: 2019-08-13 | End: 2019-08-13

## 2019-08-13 RX ORDER — LIDOCAINE HCL/PF 100 MG/5ML
SYRINGE (ML) INTRAVENOUS
Status: DISCONTINUED | OUTPATIENT
Start: 2019-08-13 | End: 2019-08-13

## 2019-08-13 RX ADMIN — OXYCODONE HYDROCHLORIDE 10 MG: 10 TABLET ORAL at 05:08

## 2019-08-13 RX ADMIN — ACETAMINOPHEN 1000 MG: 500 TABLET ORAL at 06:08

## 2019-08-13 RX ADMIN — PROPOFOL 50 MG: 10 INJECTION, EMULSION INTRAVENOUS at 03:08

## 2019-08-13 RX ADMIN — DEXAMETHASONE SODIUM PHOSPHATE 8 MG: 4 INJECTION, SOLUTION INTRAMUSCULAR; INTRAVENOUS at 03:08

## 2019-08-13 RX ADMIN — SODIUM CHLORIDE, SODIUM GLUCONATE, SODIUM ACETATE, POTASSIUM CHLORIDE, MAGNESIUM CHLORIDE, SODIUM PHOSPHATE, DIBASIC, AND POTASSIUM PHOSPHATE: .53; .5; .37; .037; .03; .012; .00082 INJECTION, SOLUTION INTRAVENOUS at 04:08

## 2019-08-13 RX ADMIN — OXYCODONE HYDROCHLORIDE 10 MG: 10 TABLET ORAL at 09:08

## 2019-08-13 RX ADMIN — ONDANSETRON 4 MG: 2 INJECTION INTRAMUSCULAR; INTRAVENOUS at 03:08

## 2019-08-13 RX ADMIN — CLINDAMYCIN IN 5 PERCENT DEXTROSE 600 MG: 12 INJECTION, SOLUTION INTRAVENOUS at 05:08

## 2019-08-13 RX ADMIN — CLINDAMYCIN PHOSPHATE 900 MG: 18 INJECTION, SOLUTION INTRAVENOUS at 03:08

## 2019-08-13 RX ADMIN — LIDOCAINE HYDROCHLORIDE 100 MG: 20 INJECTION, SOLUTION INTRAVENOUS at 03:08

## 2019-08-13 RX ADMIN — DOCUSATE SODIUM 100 MG: 100 CAPSULE, LIQUID FILLED ORAL at 09:08

## 2019-08-13 RX ADMIN — FENTANYL CITRATE 25 MCG: 50 INJECTION, SOLUTION INTRAMUSCULAR; INTRAVENOUS at 04:08

## 2019-08-13 RX ADMIN — GLYCOPYRROLATE 0.6 MG: 0.2 INJECTION, SOLUTION INTRAMUSCULAR; INTRAVENOUS at 04:08

## 2019-08-13 RX ADMIN — PROMETHAZINE HYDROCHLORIDE 12.5 MG: 25 INJECTION INTRAMUSCULAR; INTRAVENOUS at 09:08

## 2019-08-13 RX ADMIN — KETAMINE HYDROCHLORIDE 30 MG: 10 INJECTION, SOLUTION INTRAMUSCULAR; INTRAVENOUS at 03:08

## 2019-08-13 RX ADMIN — ACETAMINOPHEN 1000 MG: 500 TABLET ORAL at 05:08

## 2019-08-13 RX ADMIN — FENTANYL CITRATE 50 MCG: 50 INJECTION, SOLUTION INTRAMUSCULAR; INTRAVENOUS at 03:08

## 2019-08-13 RX ADMIN — PROPOFOL 200 MG: 10 INJECTION, EMULSION INTRAVENOUS at 03:08

## 2019-08-13 RX ADMIN — MIDAZOLAM HYDROCHLORIDE 1 MG: 1 INJECTION, SOLUTION INTRAMUSCULAR; INTRAVENOUS at 03:08

## 2019-08-13 RX ADMIN — NEOSTIGMINE METHYLSULFATE 5 MG: 1 INJECTION INTRAVENOUS at 04:08

## 2019-08-13 RX ADMIN — SODIUM CHLORIDE: 0.9 INJECTION, SOLUTION INTRAVENOUS at 03:08

## 2019-08-13 RX ADMIN — LABETALOL HYDROCHLORIDE 10 MG: 5 INJECTION, SOLUTION INTRAVENOUS at 04:08

## 2019-08-13 RX ADMIN — FENTANYL CITRATE 50 MCG: 50 INJECTION INTRAMUSCULAR; INTRAVENOUS at 12:08

## 2019-08-13 RX ADMIN — ONDANSETRON 4 MG: 2 INJECTION INTRAMUSCULAR; INTRAVENOUS at 09:08

## 2019-08-13 RX ADMIN — HYDROMORPHONE HYDROCHLORIDE 0.2 MG: 1 INJECTION, SOLUTION INTRAMUSCULAR; INTRAVENOUS; SUBCUTANEOUS at 05:08

## 2019-08-13 RX ADMIN — GABAPENTIN 100 MG: 100 CAPSULE ORAL at 05:08

## 2019-08-13 RX ADMIN — ROCURONIUM BROMIDE 10 MG: 10 INJECTION, SOLUTION INTRAVENOUS at 03:08

## 2019-08-13 RX ADMIN — ROCURONIUM BROMIDE 50 MG: 10 INJECTION, SOLUTION INTRAVENOUS at 03:08

## 2019-08-13 RX ADMIN — ENOXAPARIN SODIUM 40 MG: 100 INJECTION SUBCUTANEOUS at 06:08

## 2019-08-13 NOTE — SUBJECTIVE & OBJECTIVE
Interval History: NAEON, no further emesis, pain improving, tolerating diet  Afebrile, HDS    Medications:  Continuous Infusions:  Scheduled Meds:   acetaminophen  1,000 mg Oral Q6H    clindamycin (CLEOCIN) IVPB  600 mg Intravenous Q8H    docusate sodium  100 mg Oral BID    enoxaparin  40 mg Subcutaneous Daily    gabapentin  100 mg Oral TID    oxybutynin  10 mg Oral Daily     PRN Meds:HYDROmorphone, ondansetron, oxyCODONE, oxyCODONE, promethazine (PHENERGAN) IVPB, promethazine, sodium chloride 0.9%     Review of patient's allergies indicates:   Allergen Reactions    Sulfamethoxazole-trimethoprim Hives and Itching     Objective:     Vital Signs (Most Recent):  Temp: 98.9 °F (37.2 °C) (08/13/19 0652)  Pulse: 68 (08/13/19 0652)  Resp: 18 (08/13/19 0652)  BP: (!) 145/88 (08/13/19 0652)  SpO2: 100 % (08/13/19 0652) Vital Signs (24h Range):  Temp:  [97.4 °F (36.3 °C)-98.9 °F (37.2 °C)] 98.9 °F (37.2 °C)  Pulse:  [61-77] 68  Resp:  [16-18] 18  SpO2:  [95 %-100 %] 100 %  BP: (110-154)/(63-88) 145/88     Weight: 99.8 kg (220 lb)  Body mass index is 33.45 kg/m².    Intake/Output - Last 3 Shifts       08/11 0700 - 08/12 0659 08/12 0700 - 08/13 0659 08/13 0700 - 08/14 0659    P.O. 1070      Total Intake(mL/kg) 1070 (10.7)      Urine (mL/kg/hr) 0 (0)      Emesis/NG output 400      Total Output 400      Net +670             Urine Occurrence 4 x      Stool Occurrence 0 x      Emesis Occurrence 2 x            Physical Exam   Constitutional: She appears well-developed.   Cardiovascular: Normal rate and regular rhythm.   Pulmonary/Chest: Effort normal.       Abdominal: Soft.   Neurological: She is alert.   Skin: There is erythema.       Significant Labs:  CBC:   Recent Labs   Lab 08/13/19  0358   WBC 5.66   RBC 3.58*   HGB 10.5*   HCT 33.1*   *   MCV 93   MCH 29.3   MCHC 31.7*     BMP:   Recent Labs   Lab 08/13/19 0357   GLU 88      K 3.7      CO2 24   BUN 14   CREATININE 0.8   CALCIUM 8.8       Significant  Diagnostics:  I have reviewed all pertinent imaging results/findings within the past 24 hours.

## 2019-08-13 NOTE — TRANSFER OF CARE
"Anesthesia Transfer of Care Note    Patient: Alcira Selby    Procedure(s) Performed: Procedure(s) (LRB):  Incision and Drainage - right chest wall (Right)    Patient location: PACU    Anesthesia Type: general    Transport from OR: Transported from OR on 6-10 L/min O2 by face mask with adequate spontaneous ventilation    Post pain: adequate analgesia    Post assessment: no apparent anesthetic complications    Post vital signs: stable    Level of consciousness: sedated    Nausea/Vomiting: no nausea/vomiting    Complications: none    Transfer of care protocol was followed      Last vitals:   Visit Vitals  BP (!) 145/88 (BP Location: Right arm, Patient Position: Lying)   Pulse 68   Temp 37.2 °C (98.9 °F) (Oral)   Resp 18   Ht 5' 8" (1.727 m)   Wt 99.8 kg (220 lb)   SpO2 100%   Breastfeeding? No   BMI 33.45 kg/m²     "

## 2019-08-13 NOTE — PLAN OF CARE
Patient admitted from home through the ER 8/10/2019 with wound drainage (patient underwent R mastectomy 7/17/2019), s/p drain removed and PO antibiotics in clinic earlier in the week.  Patient expected to go for I&D of breast and discharge home with needs TBD +/- 8/14/2019.    PCP  [id number] - [place] - [address] AT [facility] [address]  [address]  [place] [address]  Phone: [phone] Fax: [phone]    [facility] [address] [address]  Phone: [phone] Fax: [phone]      Extended Emergency Contact Information  Primary Emergency Contact: Ravi Selby Jr  Address: [address] [place] [address] [facility]  Home Phone: [phone]  Mobile Phone: [phone]  Relation: Spouse       08/12/19 2004   Discharge Assessment   Assessment Type Discharge Planning Assessment   Confirmed/corrected address and phone number on facesheet? Yes   Assessment information obtained from? Medical Record   Expected Length of Stay (days) 4   Prior to hospitilization cognitive status: Alert/Oriented   Prior to hospitalization functional status: Independent   Current cognitive status: Alert/Oriented   Current Functional Status: Independent   Lives With spouse   Able to Return to Prior Arrangements yes   Is patient able to care for self after discharge? Yes   Who are your caregiver(s) and their phone number(s)?    Patient's perception of discharge disposition home or selfcare   Equipment Currently Used at Home none   Do you have any problems affording any of your prescribed medications? No   Is the patient taking medications as prescribed? yes   Does the patient have transportation home? Yes   Transportation Anticipated family or friend will provide   Discharge Plan A Home with family   Discharge Plan B Home with family;Home Health

## 2019-08-13 NOTE — PROGRESS NOTES
Ductal carcinoma in situ (DCIS) of right breast    5/21/2019 Imaging Significant Findings     Mammogram and US  Findings:   Screening mammogram 05/02/2019: There are scattered fibroglandular densities.  In the right breast, there is a bow tie biopsy marker in the central region with associated focal asymmetry and calcifications; per outside report this finding is stable. In the right breast, there are calcifications in the 12:00 o'clock position, anterior depth.  In the left breast, there is no suspicious mass, calcification or or other mammographic abnormality.      Diagnostic right mammogram 05/21/2019: In the right breast, 12:00 o'clock region anterior depth there are fine pleomorphic calcifications in a linear distribution measuring 17 mm in extent.      Whole right breast ultrasound 05/21/2019: No suspicious sonographic abnormality is seen throughout the right breast.  No abnormal lymph nodes are seen on images labeled right axilla.     Impression:   Calcifications in the right breast at 12:00 o'clock anterior depth are suspicious.     BI-RADS 4     Recommendations:   Biopsy of right breast.            6/6/2019 Biopsy     Impression: Successful tomosynthesis guided biopsy of right breast calcifications. Bowtie marker.          6/6/2019 Initial Diagnosis     Ductal carcinoma in situ (DCIS) of right breast         6/6/2019 Tumor Markers     Estrogen Receptor: Positive 50-90%  Progesterone Receptor: Positive 10-50%           7/17/2019 Surgery     MASTECTOMY RIGHT Right General   BIOPSY, LYMPH NODE, SENTINEL RIGHT Right Regional   LYMPHADENECTOMY, AXILLARY RIGHT Right               7/17/2019 Cancer Staged     Cancer Staging  Tis N0  Stage 0 per AJCC 8th ed. pathologic prognostic staging system           8/13/2019 Tumor Conference       Delayed reconstruction. Prophylactic mastectomy on opposite breast.

## 2019-08-13 NOTE — PROGRESS NOTES
[facility]  General Surgery  Progress Note    Subjective:     History of Present Illness:  Ms. Selby is a 55yo F s/p right mastectomy 7/17/19 for breast cancer. She presents to the ED with pain, erythema, and drainage from her incision site. She states she has had severe pain since surgery that has not changed. It is affecting her eating and sleep. The drains were removed on 8/8/19 and she was started on doxycycline for a fluid collection around the incision. The morning of 8/10/19 she noticed drainage from her incision. This continued and increased throughout the day. The fluid is thin slightly bloody and the daughter said it was purulent and foul smelling. She also noticed the are was hot and red.    Post-Op Info:  Procedure(s) (LRB):  Incision and Drainage - right chest wall (Right)   Day of Surgery     Interval History: NAEON, no further emesis, pain improving, tolerating diet  Afebrile, HDS    Medications:  Continuous Infusions:  Scheduled Meds:   acetaminophen  1,000 mg Oral Q6H    clindamycin (CLEOCIN) IVPB  600 mg Intravenous Q8H    docusate sodium  100 mg Oral BID    enoxaparin  40 mg Subcutaneous Daily    gabapentin  100 mg Oral TID    oxybutynin  10 mg Oral Daily     PRN Meds:HYDROmorphone, ondansetron, oxyCODONE, oxyCODONE, promethazine (PHENERGAN) IVPB, promethazine, sodium chloride 0.9%     Review of patient's allergies indicates:   Allergen Reactions    Sulfamethoxazole-trimethoprim Hives and Itching     Objective:     Vital Signs (Most Recent):  Temp: 98.9 °F (37.2 °C) (08/13/19 0652)  Pulse: 68 (08/13/19 0652)  Resp: 18 (08/13/19 0652)  BP: (!) 145/88 (08/13/19 0652)  SpO2: 100 % (08/13/19 0652) Vital Signs (24h Range):  Temp:  [97.4 °F (36.3 °C)-98.9 °F (37.2 °C)] 98.9 °F (37.2 °C)  Pulse:  [61-77] 68  Resp:  [16-18] 18  SpO2:  [95 %-100 %] 100 %  BP: (110-154)/(63-88) 145/88     Weight: 99.8 kg (220 lb)  Body mass index is 33.45 kg/m².    Intake/Output - Last 3 Shifts        08/11 0700 - 08/12 0659 08/12 0700 - 08/13 0659 08/13 0700 - 08/14 0659    P.O. 1070      Total Intake(mL/kg) 1070 (10.7)      Urine (mL/kg/hr) 0 (0)      Emesis/NG output 400      Total Output 400      Net +670             Urine Occurrence 4 x      Stool Occurrence 0 x      Emesis Occurrence 2 x            Physical Exam   Constitutional: She appears well-developed.   Cardiovascular: Normal rate and regular rhythm.   Pulmonary/Chest: Effort normal.       Abdominal: Soft.   Neurological: She is alert.   Skin: There is erythema.       Significant Labs:  CBC:   Recent Labs   Lab 08/13/19  0358   WBC 5.66   RBC 3.58*   HGB 10.5*   HCT 33.1*   *   MCV 93   MCH 29.3   MCHC 31.7*     BMP:   Recent Labs   Lab 08/13/19  0357   GLU 88      K 3.7      CO2 24   BUN 14   CREATININE 0.8   CALCIUM 8.8       Significant Diagnostics:  I have reviewed all pertinent imaging results/findings within the past 24 hours.    Assessment/Plan:     * Wound infection after surgery  53yo F s/p right mastectomy for breast cancer with erythema, pain, and drainage. 6cm fluid collection on US likely represents seroma given serous drainage    - IV Clindamycin  - NPO  - PRN pain meds  - Home medications  - To OR today for I&D, consent in chart        Adalid Chamberlain MD  General Surgery  [facility]

## 2019-08-13 NOTE — BRIEF OP NOTE
[facility]  Brief Operative Note     SUMMARY     Surgery Date: 8/13/2019     Surgeon(s) and Role:     * Darin Vaughan MD - Primary     * Adalid Chamberlain MD - Resident - Assisting        Pre-op Diagnosis:  Wound infection after surgery [T81.49XA]    Post-op Diagnosis:  Post-Op Diagnosis Codes:     * Wound infection after surgery [T81.49XA]    Procedure(s) (LRB):  Incision and Drainage - right chest wall (Right)    Anesthesia: General    Description of the findings of the procedure: Right chest wall with large area of purulence and necrotic debris. Cultures sent. Copiously irrigated. LINDA drain placed.    Findings/Key Components: Same.    Estimated Blood Loss: 30 mL         Specimens:   Specimen (12h ago, onward)    None        Dispo: Extubated and to PACU in stable condition. Will remain inpatient tonight.

## 2019-08-13 NOTE — ASSESSMENT & PLAN NOTE
55yo F s/p right mastectomy for breast cancer with erythema, pain, and drainage. 6cm fluid collection on US likely represents seroma given serous drainage    - IV Clindamycin  - NPO  - PRN pain meds  - Home medications  - To OR today for I&D, consent in chart

## 2019-08-13 NOTE — TELEPHONE ENCOUNTER
Called this patient today to assist her with scheduling a consult with [facility] to discuss reconstruction however, she is currently admitted in the hospital. She had mastectomy for DCIS stage 0. A message was left on her voice mail.

## 2019-08-14 ENCOUNTER — TELEPHONE (OUTPATIENT)
Dept: SURGERY | Facility: CLINIC | Age: 54
End: 2019-08-14

## 2019-08-14 ENCOUNTER — TELEPHONE (OUTPATIENT)
Dept: HEMATOLOGY/ONCOLOGY | Facility: CLINIC | Age: 54
End: 2019-08-14

## 2019-08-14 LAB
ANION GAP SERPL CALC-SCNC: 10 MMOL/L (ref 8–16)
BASOPHILS # BLD AUTO: 0.03 K/UL (ref 0–0.2)
BASOPHILS NFR BLD: 0.3 % (ref 0–1.9)
BUN SERPL-MCNC: 12 MG/DL (ref 6–20)
CALCIUM SERPL-MCNC: 9.2 MG/DL (ref 8.7–10.5)
CHLORIDE SERPL-SCNC: 108 MMOL/L (ref 95–110)
CO2 SERPL-SCNC: 25 MMOL/L (ref 23–29)
CREAT SERPL-MCNC: 0.8 MG/DL (ref 0.5–1.4)
DIFFERENTIAL METHOD: ABNORMAL
EOSINOPHIL # BLD AUTO: 0 K/UL (ref 0–0.5)
EOSINOPHIL NFR BLD: 0.1 % (ref 0–8)
ERYTHROCYTE [DISTWIDTH] IN BLOOD BY AUTOMATED COUNT: 13.2 % (ref 11.5–14.5)
EST. GFR  (AFRICAN AMERICAN): >60 ML/MIN/1.73 M^2
EST. GFR  (NON AFRICAN AMERICAN): >60 ML/MIN/1.73 M^2
GLUCOSE SERPL-MCNC: 177 MG/DL (ref 70–110)
HCT VFR BLD AUTO: 32.6 % (ref 37–48.5)
HGB BLD-MCNC: 10.1 G/DL (ref 12–16)
IMM GRANULOCYTES # BLD AUTO: 0.03 K/UL (ref 0–0.04)
IMM GRANULOCYTES NFR BLD AUTO: 0.3 % (ref 0–0.5)
LYMPHOCYTES # BLD AUTO: 1.9 K/UL (ref 1–4.8)
LYMPHOCYTES NFR BLD: 21.8 % (ref 18–48)
MCH RBC QN AUTO: 28.8 PG (ref 27–31)
MCHC RBC AUTO-ENTMCNC: 31 G/DL (ref 32–36)
MCV RBC AUTO: 93 FL (ref 82–98)
MONOCYTES # BLD AUTO: 0.6 K/UL (ref 0.3–1)
MONOCYTES NFR BLD: 6.3 % (ref 4–15)
NEUTROPHILS # BLD AUTO: 6.3 K/UL (ref 1.8–7.7)
NEUTROPHILS NFR BLD: 71.2 % (ref 38–73)
NRBC BLD-RTO: 0 /100 WBC
PLATELET # BLD AUTO: 446 K/UL (ref 150–350)
PMV BLD AUTO: 9.5 FL (ref 9.2–12.9)
POTASSIUM SERPL-SCNC: 3.8 MMOL/L (ref 3.5–5.1)
RBC # BLD AUTO: 3.51 M/UL (ref 4–5.4)
SODIUM SERPL-SCNC: 143 MMOL/L (ref 136–145)
WBC # BLD AUTO: 8.87 K/UL (ref 3.9–12.7)

## 2019-08-14 PROCEDURE — 25000003 PHARM REV CODE 250: Performed by: STUDENT IN AN ORGANIZED HEALTH CARE EDUCATION/TRAINING PROGRAM

## 2019-08-14 PROCEDURE — 25000003 PHARM REV CODE 250: Performed by: SURGERY

## 2019-08-14 PROCEDURE — 36415 COLL VENOUS BLD VENIPUNCTURE: CPT

## 2019-08-14 PROCEDURE — 80048 BASIC METABOLIC PNL TOTAL CA: CPT

## 2019-08-14 PROCEDURE — S0077 INJECTION, CLINDAMYCIN PHOSP: HCPCS | Performed by: STUDENT IN AN ORGANIZED HEALTH CARE EDUCATION/TRAINING PROGRAM

## 2019-08-14 PROCEDURE — 85025 COMPLETE CBC W/AUTO DIFF WBC: CPT

## 2019-08-14 PROCEDURE — 63600175 PHARM REV CODE 636 W HCPCS: Performed by: STUDENT IN AN ORGANIZED HEALTH CARE EDUCATION/TRAINING PROGRAM

## 2019-08-14 PROCEDURE — 11000001 HC ACUTE MED/SURG PRIVATE ROOM

## 2019-08-14 RX ORDER — GABAPENTIN 300 MG/1
300 CAPSULE ORAL 3 TIMES DAILY
Status: DISCONTINUED | OUTPATIENT
Start: 2019-08-14 | End: 2019-08-15 | Stop reason: HOSPADM

## 2019-08-14 RX ORDER — DOXYCYCLINE HYCLATE 100 MG
100 TABLET ORAL EVERY 12 HOURS
Status: DISCONTINUED | OUTPATIENT
Start: 2019-08-14 | End: 2019-08-15 | Stop reason: HOSPADM

## 2019-08-14 RX ORDER — OXYCODONE AND ACETAMINOPHEN 5; 325 MG/1; MG/1
1 TABLET ORAL EVERY 6 HOURS PRN
Qty: 20 TABLET | Refills: 0 | Status: ON HOLD | OUTPATIENT
Start: 2019-08-14 | End: 2019-12-12 | Stop reason: HOSPADM

## 2019-08-14 RX ORDER — CLINDAMYCIN HYDROCHLORIDE 150 MG/1
300 CAPSULE ORAL EVERY 6 HOURS
Status: DISCONTINUED | OUTPATIENT
Start: 2019-08-14 | End: 2019-08-14

## 2019-08-14 RX ADMIN — ACETAMINOPHEN 1000 MG: 500 TABLET ORAL at 11:08

## 2019-08-14 RX ADMIN — OXYBUTYNIN CHLORIDE 10 MG: 10 TABLET, EXTENDED RELEASE ORAL at 08:08

## 2019-08-14 RX ADMIN — DOXYCYCLINE HYCLATE 100 MG: 100 TABLET, COATED ORAL at 09:08

## 2019-08-14 RX ADMIN — OXYCODONE HYDROCHLORIDE 10 MG: 10 TABLET ORAL at 05:08

## 2019-08-14 RX ADMIN — OXYCODONE HYDROCHLORIDE 10 MG: 10 TABLET ORAL at 07:08

## 2019-08-14 RX ADMIN — CLINDAMYCIN IN 5 PERCENT DEXTROSE 600 MG: 12 INJECTION, SOLUTION INTRAVENOUS at 03:08

## 2019-08-14 RX ADMIN — ACETAMINOPHEN 1000 MG: 500 TABLET ORAL at 05:08

## 2019-08-14 RX ADMIN — GABAPENTIN 100 MG: 100 CAPSULE ORAL at 08:08

## 2019-08-14 RX ADMIN — CLINDAMYCIN HYDROCHLORIDE 300 MG: 150 CAPSULE ORAL at 11:08

## 2019-08-14 RX ADMIN — ENOXAPARIN SODIUM 40 MG: 100 INJECTION SUBCUTANEOUS at 05:08

## 2019-08-14 RX ADMIN — OXYCODONE HYDROCHLORIDE 10 MG: 10 TABLET ORAL at 11:08

## 2019-08-14 RX ADMIN — OXYCODONE HYDROCHLORIDE 10 MG: 10 TABLET ORAL at 09:08

## 2019-08-14 RX ADMIN — ACETAMINOPHEN 1000 MG: 500 TABLET ORAL at 12:08

## 2019-08-14 RX ADMIN — DOCUSATE SODIUM 100 MG: 100 CAPSULE, LIQUID FILLED ORAL at 08:08

## 2019-08-14 RX ADMIN — GABAPENTIN 300 MG: 300 CAPSULE ORAL at 05:08

## 2019-08-14 RX ADMIN — DOXYCYCLINE HYCLATE 100 MG: 100 TABLET, COATED ORAL at 12:08

## 2019-08-14 RX ADMIN — DOCUSATE SODIUM 100 MG: 100 CAPSULE, LIQUID FILLED ORAL at 09:08

## 2019-08-14 RX ADMIN — OXYCODONE HYDROCHLORIDE 10 MG: 10 TABLET ORAL at 03:08

## 2019-08-14 NOTE — PROGRESS NOTES
[facility]  General Surgery  Progress Note    Subjective:     History of Present Illness:  Ms. Selby is a 55yo F s/p right mastectomy 7/17/19 for breast cancer. She presents to the ED with pain, erythema, and drainage from her incision site. She states she has had severe pain since surgery that has not changed. It is affecting her eating and sleep. The drains were removed on 8/8/19 and she was started on doxycycline for a fluid collection around the incision. The morning of 8/10/19 she noticed drainage from her incision. This continued and increased throughout the day. The fluid is thin slightly bloody and the daughter said it was purulent and foul smelling. She also noticed the are was hot and red.    Post-Op Info:  Procedure(s) (LRB):  Incision and Drainage - right chest wall (Right)   1 Day Post-Op     Interval History: Underwent I&D and chest wall washout yesterday, NAEON, pain well controlled, LINDA output as expected    Medications:  Continuous Infusions:  Scheduled Meds:   acetaminophen  1,000 mg Oral Q6H    clindamycin  300 mg Oral Q6H    docusate sodium  100 mg Oral BID    doxycycline  100 mg Oral Q12H    enoxaparin  40 mg Subcutaneous Daily    gabapentin  100 mg Oral TID    oxybutynin  10 mg Oral Daily     PRN Meds:HYDROmorphone, ondansetron, oxyCODONE, oxyCODONE, promethazine (PHENERGAN) IVPB, promethazine, sodium chloride 0.9%     Review of patient's allergies indicates:   Allergen Reactions    Sulfamethoxazole-trimethoprim Hives and Itching     Objective:     Vital Signs (Most Recent):  Temp: 97.2 °F (36.2 °C) (08/14/19 0755)  Pulse: 67 (08/14/19 1111)  Resp: 18 (08/14/19 0755)  BP: (!) 112/57 (08/14/19 0755)  SpO2: 100 % (08/14/19 0755) Vital Signs (24h Range):  Temp:  [97.2 °F (36.2 °C)-99.1 °F (37.3 °C)] 97.2 °F (36.2 °C)  Pulse:  [59-91] 67  Resp:  [13-20] 18  SpO2:  [95 %-100 %] 100 %  BP: ()/(51-87) 112/57     Weight: 99.8 kg (220 lb)  Body mass index is 33.45  kg/m².    Intake/Output - Last 3 Shifts       08/12 0700 - 08/13 0659 08/13 0700 - 08/14 0659 08/14 0700 - 08/15 0659    P.O.       I.V. (mL/kg)  1200 (12)     Total Intake(mL/kg)  1200 (12)     Urine (mL/kg/hr)       Emesis/NG output       Drains  80     Total Output  80     Net  +1120                  Physical Exam   Constitutional: She appears well-developed.   Cardiovascular: Normal rate and regular rhythm.   Pulmonary/Chest: Effort normal.       Abdominal: Soft.   Neurological: She is alert.       Significant Labs:  CBC:   Recent Labs   Lab 08/14/19  0359   WBC 8.87   RBC 3.51*   HGB 10.1*   HCT 32.6*   *   MCV 93   MCH 28.8   MCHC 31.0*     CMP:   Recent Labs   Lab 08/11/19  0421  08/14/19  0359      < > 177*   CALCIUM 9.1   < > 9.2   ALBUMIN 3.0*  --   --    PROT 6.1  --   --       < > 143   K 3.2*   < > 3.8   CO2 24   < > 25      < > 108   BUN 12   < > 12   CREATININE 0.7   < > 0.8   ALKPHOS 67  --   --    ALT 14  --   --    AST 13  --   --    BILITOT 0.4  --   --     < > = values in this interval not displayed.       Significant Diagnostics:  I have reviewed all pertinent imaging results/findings within the past 24 hours.    Assessment/Plan:     * Wound infection after surgery  55yo F s/p right mastectomy for breast cancer with erythema, pain, and drainage.   Underwent I&D, chest washout, and LINDA drain placement 8/13/19    - Cultures showed rare gm neg rods on gm stain - start doxy and follow up cultures  - Switch to PO clinda  - Regular diet  - PRN pain meds  - Home medications  - Likely discharge today vs tomorrow        Adalid Chamberlain MD  General Surgery  [facility]

## 2019-08-14 NOTE — PLAN OF CARE
Problem: Fall Injury Risk  Goal: Absence of Fall and Fall-Related Injury  Outcome: Ongoing (interventions implemented as appropriate)  Intervention: Identify and Manage Contributors to Fall Injury Risk     08/13/19 2000 08/14/19 0627   Identify and Manage Contributors to Fall Injury Risk   Self-Care Promotion  --  independence encouraged   Manage Acute Allergic Reaction   Medication Review/Management medications reviewed  --      Intervention: Promote Injury-Free Environment     08/13/19 2000 08/14/19 0600   Optimize Balance and Safe Activity   Safety Promotion/Fall Prevention  --  assistive device/personal item within reach;commode/urinal/bedpan at bedside;Fall Risk signage in place;lighting adjusted;medications reviewed;nonskid shoes/socks when out of bed;side rails raised x 2;instructed to call staff for mobility   Optimize [place] and Functional Mobility   Environmental Safety Modification assistive device/personal items within reach;clutter free environment maintained;lighting adjusted  --          Problem: Adult Inpatient Plan of Care  Goal: Plan of Care Review  Outcome: Ongoing (interventions implemented as appropriate)  Plan of care reviewed with pt, pt verbalized understanding  Rounding for safety and pt care per policy  Safety precautions maintained  Call light within reached, bed lowest position, bed wheels locked, bed rails up x2.  No acute distress noted, will continue to monitor.         08/14/19 0627   Plan of Care Review   Plan of Care Reviewed With patient     Goal: Absence of Hospital-Acquired Illness or Injury  Outcome: Ongoing (interventions implemented as appropriate)  Intervention: Identify and Manage Fall Risk     08/14/19 0600   Optimize Balance and Safe Activity   Safety Promotion/Fall Prevention assistive device/personal item within reach;commode/urinal/bedpan at bedside;Fall Risk signage in place;lighting adjusted;medications reviewed;nonskid shoes/socks when out of bed;side rails  raised x 2;instructed to call staff for mobility     Intervention: Prevent VTE (venous thromboembolism)     08/14/19 06   Prevent or Manage Embolism   VTE Prevention/Management dorsiflexion/plantar flexion performed;ambulation promoted;remove, assess skin and reapply sequential compression device

## 2019-08-14 NOTE — PROGRESS NOTES
Pharmacist Intervention IV to PO Note    Alcira Selby is a 54 y.o. female being treated with IV medication clindamycin    Patient Data:    Vital Signs (Most Recent):  Temp: 97.2 °F (36.2 °C) (08/14/19 0755)  Pulse: 71 (08/14/19 0755)  Resp: 18 (08/14/19 0755)  BP: (!) 112/57 (08/14/19 0755)  SpO2: 100 % (08/14/19 0755)   Vital Signs (72h Range):  Temp:  [97.1 °F (36.2 °C)-99.1 °F (37.3 °C)]   Pulse:  [50-91]   Resp:  [13-20]   BP: ()/(51-88)   SpO2:  [95 %-100 %]      CBC:  Recent Labs   Lab 08/12/19  1152 08/13/19  0358 08/14/19  0359   WBC 5.29 5.66 8.87   RBC 3.77* 3.58* 3.51*   HGB 11.0* 10.5* 10.1*   HCT 34.6* 33.1* 32.6*   * 425* 446*   MCV 92 93 93   MCH 29.2 29.3 28.8   MCHC 31.8* 31.7* 31.0*     CMP:     Recent Labs   Lab 08/10/19  2334 08/11/19  0421 08/12/19  1152 08/13/19  0357 08/14/19  0359    101 101 88 177*   CALCIUM 9.5 9.1 9.3 8.8 9.2   ALBUMIN 3.5 3.0*  --   --   --    PROT 7.2 6.1  --   --   --     141 139 140 143   K 3.8 3.2* 3.3* 3.7 3.8   CO2 23 24 28 24 25    107 104 105 108   BUN 14 12 13 14 12   CREATININE 0.8 0.7 0.8 0.8 0.8   ALKPHOS 82 67  --   --   --    ALT 16 14  --   --   --    AST 15 13  --   --   --    BILITOT 0.3 0.4  --   --   --        Dietary Orders:  Diet Orders            Diet Adult Regular (IDDSI Level 7): Regular starting at 08/13 1710            Based on the following criteria, this patient qualifies for intravenous to oral conversion:  [x] The patients gastrointestinal tract is functioning (tolerating medications via oral or enteral route for 24 hours and tolerating food or enteral feeds for 24 hours).  [x] The patient is hemodynamically stable for 24 hours (heart rate <100 beats per minute, systolic blood pressure >99 mm Hg, and respiratory rate <20 breaths per minute).  [x] The patient shows clinical improvement (afebrile for at least 24 hours and white blood cell count downtrending or normalized). Additionally, the patient must be  non-neutropenic (absolute neutrophil count >500 cells/mm3).  [x] For antimicrobials, the patient has received IV therapy for at least 24 hours.    Clindamycin  mg q8h will be changed to  mg q6h    Pharmacist's Name: Ryanne Hagan  Pharmacist's Extension: [phone]

## 2019-08-14 NOTE — ANESTHESIA POSTPROCEDURE EVALUATION
Anesthesia Post Evaluation    Patient: Alcira Selby    Procedure(s) Performed: Procedure(s) (LRB):  Incision and Drainage - right chest wall (Right)    Final Anesthesia Type: general  Patient location during evaluation: PACU  Patient participation: Yes- Able to Participate  Level of consciousness: awake and alert, awake and oriented  Post-procedure vital signs: reviewed and stable  Pain management: adequate  Airway patency: patent  PONV status at discharge: No PONV  Anesthetic complications: no      Cardiovascular status: blood pressure returned to baseline, stable and hemodynamically stable  Respiratory status: unassisted, spontaneous ventilation and room air  Hydration status: euvolemic  Follow-up not needed.          Vitals Value Taken Time   /57 8/14/2019  7:55 AM   Temp 36.2 °C (97.2 °F) 8/14/2019  7:55 AM   Pulse 67 8/14/2019 11:11 AM   Resp 18 8/14/2019  7:55 AM   SpO2 100 % 8/14/2019  7:55 AM         Event Time     Out of Recovery 08/13/2019 18:02:00          Pain/Alton Score: Pain Rating Prior to Med Admin: 7 (8/14/2019  7:52 AM)  Pain Rating Post Med Admin: 4 (8/13/2019  6:02 PM)  Alton Score: 10 (8/13/2019  7:02 PM)

## 2019-08-14 NOTE — TELEPHONE ENCOUNTER
Returned call to patient to assist with rescheduling missed consultation yesterday with Dr. Garcia due to hospitalization. Patient rescheduled for 8/20 at 1 p.m. At the [facility]. She expressed gratitude and verbally confirmed.

## 2019-08-14 NOTE — TELEPHONE ENCOUNTER
----- Message from Yecenia Beverly MA sent at 8/14/2019  2:12 PM CDT -----  Contact: self/[phone]  PT is in the hospital and need to R/S her appt she missed.

## 2019-08-14 NOTE — OP NOTE
DATE OF PROCEDURE:  08/13/2019.    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    PROCEDURES:  Incision and drainage of right chest wall mastectomy site with   irrigation and debridement and evacuation of abscess with placement of closed   suction drain and primary closure.    PROCEDURE IN DETAIL:  The patient underwent informed consent.  The history and   physical examination was reviewed and updated.  The right side was marked.  She   was brought to the operating room.  Under general anesthesia, she was in a   supine position, the right chest wall was prepped and draped in a sterile   fashion.  Purulent exudative material had been spontaneously draining near the   midline just medial to the mastectomy closure site separate from the incision.    The medial aspect of the transverse mastectomy incision was opened.  A   small-to-moderate amount of purulent cloudy exudative material was drained and   cultured.  We opened the medial aspect of the chest wall.  This communicated   laterally as well underlying and beneath the mastectomy flaps in the lateral   aspect towards the axillary tail region.  There was also another pocket of   moderate amount of exudative purulent material.  This was also cultured with the   same culture material as it appeared similar and continuous.  Essentially, the   entire wound was opened and irrigated with sterile saline.  Once the purulent   exudative fluid was evacuated, drained and irrigated, we placed a separate new   #19 round Joe drain through a separate inferior medial stab incision.  This   had been placed again beneath the mastectomy flaps respectively.  The mastectomy   skin incision site was  then closed with a deep dermal and subcutaneous Vicryl   sutures followed by skin staples and a sterile Xeroform dressing to create an   occlusive closed suction drainage system.  The patient will be maintained on   antibiotics while we await the culture and sensitivity.  She tolerated the   procedure well without complication.  Estimated blood loss was minimal.  All   needle, instrument and sponge counts were correct.      JOSSELYN/JOSESITO  dd: 08/14/2019 11:54:03 (CDT)  td: 08/14/2019 14:41:13 (CDT)  Doc ID   #[id number]  Job ID #[id number]    CC:

## 2019-08-14 NOTE — SUBJECTIVE & OBJECTIVE
Interval History: Underwent I&D and chest wall washout yesterday, NAEON, pain well controlled, LINDA output as expected    Medications:  Continuous Infusions:  Scheduled Meds:   acetaminophen  1,000 mg Oral Q6H    clindamycin  300 mg Oral Q6H    docusate sodium  100 mg Oral BID    doxycycline  100 mg Oral Q12H    enoxaparin  40 mg Subcutaneous Daily    gabapentin  100 mg Oral TID    oxybutynin  10 mg Oral Daily     PRN Meds:HYDROmorphone, ondansetron, oxyCODONE, oxyCODONE, promethazine (PHENERGAN) IVPB, promethazine, sodium chloride 0.9%     Review of patient's allergies indicates:   Allergen Reactions    Sulfamethoxazole-trimethoprim Hives and Itching     Objective:     Vital Signs (Most Recent):  Temp: 97.2 °F (36.2 °C) (08/14/19 0755)  Pulse: 67 (08/14/19 1111)  Resp: 18 (08/14/19 0755)  BP: (!) 112/57 (08/14/19 0755)  SpO2: 100 % (08/14/19 0755) Vital Signs (24h Range):  Temp:  [97.2 °F (36.2 °C)-99.1 °F (37.3 °C)] 97.2 °F (36.2 °C)  Pulse:  [59-91] 67  Resp:  [13-20] 18  SpO2:  [95 %-100 %] 100 %  BP: ()/(51-87) 112/57     Weight: 99.8 kg (220 lb)  Body mass index is 33.45 kg/m².    Intake/Output - Last 3 Shifts       08/12 0700 - 08/13 0659 08/13 0700 - 08/14 0659 08/14 0700 - 08/15 0659    P.O.       I.V. (mL/kg)  1200 (12)     Total Intake(mL/kg)  1200 (12)     Urine (mL/kg/hr)       Emesis/NG output       Drains  80     Total Output  80     Net  +1120                  Physical Exam   Constitutional: She appears well-developed.   Cardiovascular: Normal rate and regular rhythm.   Pulmonary/Chest: Effort normal.       Abdominal: Soft.   Neurological: She is alert.       Significant Labs:  CBC:   Recent Labs   Lab 08/14/19  0359   WBC 8.87   RBC 3.51*   HGB 10.1*   HCT 32.6*   *   MCV 93   MCH 28.8   MCHC 31.0*     CMP:   Recent Labs   Lab 08/11/19  0421  08/14/19  0359      < > 177*   CALCIUM 9.1   < > 9.2   ALBUMIN 3.0*  --   --    PROT 6.1  --   --       < > 143   K 3.2*   < >  3.8   CO2 24   < > 25      < > 108   BUN 12   < > 12   CREATININE 0.7   < > 0.8   ALKPHOS 67  --   --    ALT 14  --   --    AST 13  --   --    BILITOT 0.4  --   --     < > = values in this interval not displayed.       Significant Diagnostics:  I have reviewed all pertinent imaging results/findings within the past 24 hours.

## 2019-08-14 NOTE — NURSING TRANSFER
Nursing Transfer Note      8/13/2019     Transfer To: [id number]    Transfer via stretcher    Transfer with cardiac monitoring    Transported by cristian Godoy    Medicines sent: no    Chart send with patient: Yes    Notified: family

## 2019-08-14 NOTE — ASSESSMENT & PLAN NOTE
55yo F s/p right mastectomy for breast cancer with erythema, pain, and drainage.   Underwent I&D, chest washout, and LINDA drain placement 8/13/19    - Cultures showed rare gm neg rods on gm stain - start doxy and follow up cultures  - Switch to PO clinda  - Regular diet  - PRN pain meds  - Home medications  - Likely discharge today vs tomorrow

## 2019-08-15 VITALS
RESPIRATION RATE: 18 BRPM | OXYGEN SATURATION: 98 % | HEART RATE: 69 BPM | DIASTOLIC BLOOD PRESSURE: 90 MMHG | WEIGHT: 220 LBS | SYSTOLIC BLOOD PRESSURE: 151 MMHG | BODY MASS INDEX: 33.34 KG/M2 | TEMPERATURE: 97 F | HEIGHT: 68 IN

## 2019-08-15 LAB
ANION GAP SERPL CALC-SCNC: 9 MMOL/L (ref 8–16)
BACTERIA SPEC AEROBE CULT: ABNORMAL
BASOPHILS # BLD AUTO: 0.06 K/UL (ref 0–0.2)
BASOPHILS NFR BLD: 0.9 % (ref 0–1.9)
BUN SERPL-MCNC: 12 MG/DL (ref 6–20)
CALCIUM SERPL-MCNC: 8.9 MG/DL (ref 8.7–10.5)
CHLORIDE SERPL-SCNC: 107 MMOL/L (ref 95–110)
CO2 SERPL-SCNC: 26 MMOL/L (ref 23–29)
CREAT SERPL-MCNC: 0.7 MG/DL (ref 0.5–1.4)
DIFFERENTIAL METHOD: ABNORMAL
EOSINOPHIL # BLD AUTO: 0.2 K/UL (ref 0–0.5)
EOSINOPHIL NFR BLD: 2.3 % (ref 0–8)
ERYTHROCYTE [DISTWIDTH] IN BLOOD BY AUTOMATED COUNT: 13.3 % (ref 11.5–14.5)
EST. GFR  (AFRICAN AMERICAN): >60 ML/MIN/1.73 M^2
EST. GFR  (NON AFRICAN AMERICAN): >60 ML/MIN/1.73 M^2
GLUCOSE SERPL-MCNC: 99 MG/DL (ref 70–110)
HCT VFR BLD AUTO: 30.9 % (ref 37–48.5)
HGB BLD-MCNC: 9.8 G/DL (ref 12–16)
IMM GRANULOCYTES # BLD AUTO: 0.02 K/UL (ref 0–0.04)
IMM GRANULOCYTES NFR BLD AUTO: 0.3 % (ref 0–0.5)
LYMPHOCYTES # BLD AUTO: 3.2 K/UL (ref 1–4.8)
LYMPHOCYTES NFR BLD: 49.5 % (ref 18–48)
MCH RBC QN AUTO: 29.4 PG (ref 27–31)
MCHC RBC AUTO-ENTMCNC: 31.7 G/DL (ref 32–36)
MCV RBC AUTO: 93 FL (ref 82–98)
MONOCYTES # BLD AUTO: 0.5 K/UL (ref 0.3–1)
MONOCYTES NFR BLD: 7.6 % (ref 4–15)
NEUTROPHILS # BLD AUTO: 2.5 K/UL (ref 1.8–7.7)
NEUTROPHILS NFR BLD: 39.4 % (ref 38–73)
NRBC BLD-RTO: 0 /100 WBC
PLATELET # BLD AUTO: 395 K/UL (ref 150–350)
PMV BLD AUTO: 9.7 FL (ref 9.2–12.9)
POTASSIUM SERPL-SCNC: 3.7 MMOL/L (ref 3.5–5.1)
RBC # BLD AUTO: 3.33 M/UL (ref 4–5.4)
SODIUM SERPL-SCNC: 142 MMOL/L (ref 136–145)
WBC # BLD AUTO: 6.44 K/UL (ref 3.9–12.7)

## 2019-08-15 PROCEDURE — 80048 BASIC METABOLIC PNL TOTAL CA: CPT

## 2019-08-15 PROCEDURE — 25000003 PHARM REV CODE 250: Performed by: STUDENT IN AN ORGANIZED HEALTH CARE EDUCATION/TRAINING PROGRAM

## 2019-08-15 PROCEDURE — 85025 COMPLETE CBC W/AUTO DIFF WBC: CPT

## 2019-08-15 PROCEDURE — 36415 COLL VENOUS BLD VENIPUNCTURE: CPT

## 2019-08-15 RX ORDER — DOXYCYCLINE HYCLATE 100 MG
100 TABLET ORAL EVERY 12 HOURS
Qty: 12 TABLET | Refills: 0 | Status: SHIPPED | OUTPATIENT
Start: 2019-08-15 | End: 2019-08-21

## 2019-08-15 RX ADMIN — DOXYCYCLINE HYCLATE 100 MG: 100 TABLET, COATED ORAL at 08:08

## 2019-08-15 RX ADMIN — ACETAMINOPHEN 1000 MG: 500 TABLET ORAL at 01:08

## 2019-08-15 RX ADMIN — DOCUSATE SODIUM 100 MG: 100 CAPSULE, LIQUID FILLED ORAL at 08:08

## 2019-08-15 RX ADMIN — OXYCODONE HYDROCHLORIDE 10 MG: 10 TABLET ORAL at 05:08

## 2019-08-15 RX ADMIN — OXYCODONE HYDROCHLORIDE 10 MG: 10 TABLET ORAL at 01:08

## 2019-08-15 RX ADMIN — ACETAMINOPHEN 1000 MG: 500 TABLET ORAL at 05:08

## 2019-08-15 RX ADMIN — OXYBUTYNIN CHLORIDE 10 MG: 10 TABLET, EXTENDED RELEASE ORAL at 08:08

## 2019-08-15 RX ADMIN — GABAPENTIN 300 MG: 300 CAPSULE ORAL at 08:08

## 2019-08-15 NOTE — PLAN OF CARE
Patient discharged home with no needs today.    Future Appointments   Date Time Provider Department Center   8/20/2019  1:00 PM Harsha Wei MD [facility]   8/29/2019 11:00 AM Darin Vaughan MD [facility]          08/15/19 1128   Final Note   Assessment Type Final Discharge Note   Anticipated Discharge Disposition Home   Hospital Follow Up  Appt(s) scheduled? Yes   Right Care Referral Info   Post Acute Recommendation No Care

## 2019-08-15 NOTE — DISCHARGE SUMMARY
[facility]  General Surgery  Discharge Summary      Patient Name: Alcira Selby  MRN: [id number]  Admission Date: 8/10/2019  Hospital Length of Stay: 2 days  Discharge Date and Time:  08/15/2019 7:46 AM  Attending Physician: Darin Vaughan MD   Discharging Provider: Adalid Chamberlain MD  Primary Care Provider: Primary Doctor No     HPI: Ms. Selby is a 55yo F s/p right mastectomy 7/17/19 for breast cancer. She presents to the ED with pain, erythema, and drainage from her incision site. She states she has had severe pain since surgery that has not changed. It is affecting her eating and sleep. The drains were removed on 8/8/19 and she was started on doxycycline for a fluid collection around the incision. The morning of 8/10/19 she noticed drainage from her incision. This continued and increased throughout the day. The fluid is thin slightly bloody and the daughter said it was purulent and foul smelling. She also noticed the are was hot and red.    Procedure(s) (LRB):  Incision and Drainage - right chest wall (Right)     Hospital Course: Patient was admitted and started on IV abx. She was taken for I&D 8/13/19 and cultures and purulent fluid were drained. She was closed with a new drain placed. Her abx were tailored to her new cultures showing gram neg rods. POD 2 she was afebrile, HDS, ambulating, tolerating diet, and her pain was well controlled. She was discharged on POD 2 with instructions to follow up in 2 weeks with Dr. Vaughan in clinic.    Physical Exam   Constitutional: She is oriented to person, place, and time. She appears well-developed and well-nourished.   Cardiovascular: Normal rate and regular rhythm.   Pulmonary/Chest: Effort normal. No respiratory distress.       Abdominal: Soft. She exhibits no distension.   Musculoskeletal: Normal range of motion.   Neurological: She is alert and oriented to person, place, and time.   Skin: Skin is warm and dry.   Psychiatric: She has a  normal mood and affect. Her behavior is normal. Judgment and thought content normal.   Nursing note and vitals reviewed.        Consults:   Consults (From admission, onward)        Status Ordering Provider     Inpatient consult to General Surgery  Once     Provider:  (Not yet assigned)    YANDEL Monterroso          Significant Diagnostic Studies: Labs:   BMP:   Recent Labs   Lab 08/14/19  0359 08/15/19  0442   * 99    142   K 3.8 3.7    107   CO2 25 26   BUN 12 12   CREATININE 0.8 0.7   CALCIUM 9.2 8.9    and CBC   Recent Labs   Lab 08/14/19  0359 08/15/19  0442   WBC 8.87 6.44   HGB 10.1* 9.8*   HCT 32.6* 30.9*   * 395*     Microbiology: Wound Culture: positive for gram negative rods, speciation and susceptibilities pending    Pending Diagnostic Studies:     None        Final Active Diagnoses:    Diagnosis Date Noted POA    PRINCIPAL PROBLEM:  Wound infection after surgery [T81.49XA] 08/11/2019 Yes      Problems Resolved During this Admission:    Diagnosis Date Noted Date Resolved POA    Surgical site infection [T81.49XA] 08/11/2019 08/11/2019 Unknown      Discharged Condition: good    Disposition:     Follow Up:  Follow-up Information     Darin Vaughan MD In 2 weeks.    Specialty:  General Surgery  Why:  SURGICAL FOLLOW UP with Dr Vaughan with be scheduled and called to patient, message sent to clinic.  Contact information:  [address] [facility] [address]  [phone]                 Patient Instructions:      Lifting restrictions   Order Comments: Do not lift more than 10 lbs for 2 weeks     Notify your health care provider if you experience any of the following:  redness, tenderness, or signs of infection (pain, swelling, redness, odor or green/yellow discharge around incision site)     Notify your health care provider if you experience any of the following:  severe uncontrolled pain     Notify your health care provider if you experience any of the  following:  persistent nausea and vomiting or diarrhea     Notify your health care provider if you experience any of the following:  temperature >100.4     No dressing needed   Order Comments: No dressing needed  Ok to shower with warm soap and water starting tomorrow (8/16/19)  Do not submerge in bath, pool, lake etc until drains are removed     Medications:  Reconciled Home Medications:      Medication List      CHANGE how you take these medications    * doxycycline 100 MG Cap  Commonly known as:  VIBRAMYCIN  Take 1 capsule (100 mg total) by mouth every 12 (twelve) hours. for 10 days  What changed:  Another medication with the same name was added. Make sure you understand how and when to take each.     * doxycycline 100 MG tablet  Commonly known as:  VIBRA-TABS  Take 1 tablet (100 mg total) by mouth every 12 (twelve) hours. for 6 days  What changed:  You were already taking a medication with the same name, and this prescription was added. Make sure you understand how and when to take each.         * This list has 2 medication(s) that are the same as other medications prescribed for you. Read the directions carefully, and ask your doctor or other care provider to review them with you.            CONTINUE taking these medications    butalbital-acetaminophen-caff -40 mg -40 mg Cap  Take by mouth once daily.     docusate sodium 100 MG capsule  Commonly known as:  COLACE  Take 100 mg by mouth 2 (two) times daily.     fluticasone propionate 50 mcg/actuation nasal spray  Commonly known as:  FLONASE  1 spray by Each Nare route once daily.     ondansetron 4 MG tablet  Commonly known as:  ZOFRAN  Take 1 tablet (4 mg total) by mouth every 8 (eight) hours as needed for Nausea.     oxybutynin 10 MG 24 hr tablet  Commonly known as:  DITROPAN-XL  TAKE 1 TABLET BY MOUTH ONCE DAILY     oxyCODONE-acetaminophen 5-325 mg per tablet  Commonly known as:  PERCOCET  Take 1 tablet by mouth every 6 (six) hours as needed for  Pain.     SUPER B COMPLEX-B-12 ORAL  Take by mouth.     triamcinolone acetonide 0.1% 0.1 % cream  Commonly known as:  KENALOG  APPLY AA BID PRN     VITAMIN B-12 100 MCG tablet  Generic drug:  cyanocobalamin  Take by mouth.        STOP taking these medications    meloxicam 15 MG tablet  Commonly known as:  MOBIC     predniSONE 10 MG tablet  Commonly known as:  SHAYNE Chamberlain MD  General Surgery  [facility]

## 2019-08-15 NOTE — PLAN OF CARE
08/15/19 0951   Post-Acute Status   Post-Acute Authorization Other   Other Status No Post-Acute Service Needs     No SW needs identified at this time.    Estephanie Walker LMSW  [facility]  [id number]

## 2019-08-15 NOTE — PLAN OF CARE
Problem: Fall Injury Risk  Goal: Absence of Fall and Fall-Related Injury  Outcome: Ongoing (interventions implemented as appropriate)  Intervention: Identify and Manage Contributors to Fall Injury Risk     08/15/19 0441   Identify and Manage Contributors to Fall Injury Risk   Self-Care Promotion independence encouraged   Manage Acute Allergic Reaction   Medication Review/Management medications reviewed     Intervention: Promote Injury-Free Environment     08/14/19 2000 08/15/19 0400   Optimize Balance and Safe Activity   Safety Promotion/Fall Prevention  --  assistive device/personal item within reach;commode/urinal/bedpan at bedside;lighting adjusted;medications reviewed;Fall Risk signage in place;nonskid shoes/socks when out of bed;side rails raised x 2;instructed to call staff for mobility   Optimize [place] and Functional Mobility   Environmental Safety Modification assistive device/personal items within reach;clutter free environment maintained;lighting adjusted  --          Problem: Adult Inpatient Plan of Care  Goal: Plan of Care Review  Outcome: Ongoing (interventions implemented as appropriate)  Pt AAOx4, VSS, No falls noted, fall precautions remain.   Pain assessed, no pain noted, pain controlled with PRN regimen, pt comfortable.    Plan of care reviewed with patient; verbalized understanding.   Medications reviewed and administered as ordered.  Rounding for safety and patient care per policy.   Safety precautions maintained.   Call light within reach, bed wheels locked, bed in lowest position, side rails ^x2, safety maintained. NADN, Will continue monitor.               08/15/19 0441   Plan of Care Review   Plan of Care Reviewed With patient     Goal: Absence of Hospital-Acquired Illness or Injury  Outcome: Ongoing (interventions implemented as appropriate)  Intervention: Identify and Manage Fall Risk     08/15/19 0400   Optimize Balance and Safe Activity   Safety Promotion/Fall Prevention assistive  device/personal item within reach;commode/urinal/bedpan at bedside;lighting adjusted;medications reviewed;Fall Risk signage in place;nonskid shoes/socks when out of bed;side rails raised x 2;instructed to call staff for mobility     Intervention: Prevent VTE (venous thromboembolism)     08/15/19 0400   Prevent or Manage Embolism   VTE Prevention/Management dorsiflexion/plantar flexion performed;fluids promoted;ambulation promoted       Goal: Optimal Comfort and Wellbeing  Outcome: Ongoing (interventions implemented as appropriate)  Intervention: Provide Person-Centered Care     08/15/19 0400   Support Dyspnea Relief   Trust Relationship/Rapport care explained

## 2019-08-19 ENCOUNTER — TELEPHONE (OUTPATIENT)
Dept: FAMILY MEDICINE | Facility: CLINIC | Age: 54
End: 2019-08-19

## 2019-08-19 ENCOUNTER — PATIENT MESSAGE (OUTPATIENT)
Dept: ADMINISTRATIVE | Facility: OTHER | Age: 54
End: 2019-08-19

## 2019-08-19 ENCOUNTER — PATIENT OUTREACH (OUTPATIENT)
Dept: ADMINISTRATIVE | Facility: CLINIC | Age: 54
End: 2019-08-19

## 2019-08-19 LAB — BACTERIA SPEC ANAEROBE CULT: NORMAL

## 2019-08-19 NOTE — PATIENT INSTRUCTIONS
Recognizing and Treating Wound Infection  Wounds can become infected with harmful germs (bacteria). This prevents healing. It also increases your risk of scars. In some cases, the infection may spread to other parts of your body. And infection with the bacteria that cause tetanus can be fatal. Know what to look for and get prompt treatment for infection.    Risk factors  A wound is more likely to become infected if it:  · Results from a hole (puncture), such as from a nail or piece of glass  · Results from a human or animal bite  · Isn't cleaned or treated within 8 hours  · Occurs in your hand, foot, leg, armpit, or groin (the area where your belly meets your thighs)  · Contains dirt or saliva  · Heals very slowly  · Occurs in a person with diabetes, alcoholism, or a compromised immune system    Symptoms of infection  Call your healthcare provider at the first sign of infection, such as:  · Yellow, yellow-green, or foul-smelling drainage from a wound  · More pain, swelling, or redness in or near a wound  · A change in the color or size of a wound  · Red streaks in the skin around the wound  · Fever  Treatment  Treatment is likely to depend on the type of infection you have, and how serious it is. Your healthcare provider may prescribe oral antibiotics to help fight bacteria. Your provider may also flush the wound with an antibiotic solution or apply an antibiotic ointment. Sometimes a pocket of pus (abscess) may form. In that case, the abscess will be opened and the fluid drained. You may need hospital care if the infection is very severe.  Preventing wound infection  Follow these steps to help keep wounds from getting infected:  · Wash the wound right away with soap and water.  · Apply a small amount of antibiotic ointment. You can buy this without a prescription.  · Cover wounds with a bandage or gauze dressing. Change daily.  · Keep the wound clean and dry for the first 24 hours.  · Change the dressing daily  using sterile gloves.   Date Last Reviewed: 8/13/2015  © 9090-5175 [employer]. [address] [place] [address], [place] [address]. All rights reserved. This information is not intended as a substitute for professional medical care. Always follow your healthcare professional's instructions.

## 2019-08-19 NOTE — PROGRESS NOTES
Please forward this important TCC information to your provider in order to maximize the post discharge care delivery of this patient.    C3 nurse spoke with Alcira Selby for a TCC post hospital discharge follow up call. The patient does not have a scheduled HOSFU appointment with non [facility] PCP within 7-14 days post hospital discharge date 08/15/2019. C3 nurse was unable to schedule HOSFU appointment in [facility].  Patient instructed to please contact non  [facility] for hosp f/u on or before 08/29/2019.    Respectfully,    Elicia Junior LPN    [facility]    [email or web address]       Please do not reply to this message, as this inbox is not routinely monitored.

## 2019-08-20 ENCOUNTER — OFFICE VISIT (OUTPATIENT)
Dept: SURGERY | Facility: CLINIC | Age: 54
End: 2019-08-20
Payer: COMMERCIAL

## 2019-08-20 VITALS
WEIGHT: 229.31 LBS | HEART RATE: 78 BPM | HEIGHT: 70 IN | OXYGEN SATURATION: 97 % | RESPIRATION RATE: 16 BRPM | BODY MASS INDEX: 32.83 KG/M2 | TEMPERATURE: 99 F | SYSTOLIC BLOOD PRESSURE: 130 MMHG | DIASTOLIC BLOOD PRESSURE: 79 MMHG

## 2019-08-20 DIAGNOSIS — D05.11 DUCTAL CARCINOMA IN SITU (DCIS) OF RIGHT BREAST: Primary | ICD-10-CM

## 2019-08-20 PROCEDURE — 99205 OFFICE O/P NEW HI 60 MIN: CPT | Mod: S$GLB,,, | Performed by: INTERNAL MEDICINE

## 2019-08-20 PROCEDURE — 99999 PR PBB SHADOW E&M-EST. PATIENT-LVL IV: ICD-10-PCS | Mod: PBBFAC,,, | Performed by: INTERNAL MEDICINE

## 2019-08-20 PROCEDURE — 99205 PR OFFICE/OUTPT VISIT, NEW, LEVL V, 60-74 MIN: ICD-10-PCS | Mod: S$GLB,,, | Performed by: INTERNAL MEDICINE

## 2019-08-20 PROCEDURE — 99999 PR PBB SHADOW E&M-EST. PATIENT-LVL IV: CPT | Mod: PBBFAC,,, | Performed by: INTERNAL MEDICINE

## 2019-08-20 NOTE — PROGRESS NOTES
Subjective:       Patient ID: Alcira Selby is a 54 y.o. female.    Chief Complaint: No chief complaint on file.    HPI     DATE OF VISIT:  2019.    REFERRING PHYSICIAN:  Darin Vaughan M.D.    REASON FOR REFERRAL:  Recent diagnosis of recurrent DCIS, status post   mastectomy.    HISTORY OF PRESENT ILLNESS:  Mrs. Selby is a 54-year-old postmenopausal   -American female with a remote history of DCIS treated in  with   lumpectomy, radiation therapy and five years of tamoxifen, which she completed   in .  She recently presented with an abnormal mammogram and on 2019   she underwent a biopsy at our facility of the right breast.  The biopsy showed   DCIS comedo type with no invasive component.  The DCIS was ER positive and VT   positive.  She was seen by Dr. Vaughan and on 2019 she underwent a   completion mastectomy and sentinel lymph node biopsy.  The pathology report from   the procedure indicates that there was no evidence of invasive cancer that she   only had residual DCIS on the resected specimen.  The sentinel lymph node was   negative.  She now presents to discuss further treatment options.    PAST MEDICAL HISTORY:  As above.    PAST SURGICAL HISTORY:  She has a history of multiple prior surgeries including   myomectomy, simple hysterectomy at age 23, bilateral knee replacement surgeries,   tonsillectomy, surgeries for bilateral plantar fasciitis and eventually   oophorectomy.    SOCIAL HISTORY:  She is disabled with degenerative arthritis of her knees and   does not work.  She is .  She does not smoke and does not drink alcohol.    GYN HISTORY:  Menarche was at 13 and she is .  She underwent a hysterectomy   at age 23.  She took OCPs for five years and she denies any HRT.    FAMILY HISTORY:  Maternal aunt is a patient of mine with bilateral breast   cancers.  Review of Systems    Overall she feels OK.  She is still sore from her surgery and she has a  drainage tube in place.  She also complains of her chronic bilateral knee pains.  She denies any anxiety, depression, easy bruising, fevers, chills, night  sweats, weight loss, nausea, vomiting, diarrhea, constipation, diplopia, blurred vision, headache, chest pain, palpitations, shortness of breath, left breast pain, abdominal pain, extremity pain, or difficulty ambulating.  The remainder of the ten-point ROS, including general, skin, lymph, H/N, cardiorespiratory, GI, , Neuro, Endocrine, and psychiatric is negative.     Objective:      Physical Exam      She is alert, oriented to time, place, person, pleasant, well      nourished, in no acute physical distress.  She is accompanied by her son.                                   VITAL SIGNS:  Reviewed                                      HEENT:  Normal.  There are no nasal, oral, lip, gingival, auricular, lid,    or conjunctival lesions.  Mucosae are moist and pink, and there is no        thrush.  Pupils are equal, reactive to light and accommodation.              Extraocular muscle movements are intact.  Dentition is good.  There is no frontal or maxillary tenderness.                                     NECK:  Supple without JVD, adenopathy, or thyromegaly.                       LUNGS:  Clear to auscultation without wheezing, rales, or rhonchi.           CARDIOVASCULAR:  Reveals an S1, S2, no murmurs, no rubs, no gallops.         ABDOMEN:  Soft, nontender, without organomegaly.  Bowel sounds are    present.                                                                     EXTREMITIES:  No cyanosis, clubbing, or edema.                               BREASTS:  She is status post right mastectomy with a healing incision.  Multiple staples are identified.  She has one drainage tube.   There are no masses in the left breast.                                     LYMPHATIC:  There is no cervical, axillary, or supraclavicular adenopathy.   SKIN:  Warm and moist, without  petechiae, rashes, induration, or ecchymoses.           NEUROLOGIC:  DTRs are 0-1+ bilaterally, symmetrical, motor function is 5/5,  and cranial nerves are  within normal limits.    Assessment:       1. 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         Plan:         I had a long discussion with her;  she had DCIS initially in 2004, and was treated appropriately at the [facility] with lumpectomy, XRT, and 5 years of tamoxifen.  Unfortunately she is one of those few patients who recur, and the incidence of recurrence is estimated at 4-5 %.  She has now undergone a completion mastectomy.  I explained to her that her chance of a cure at this point is 99 %.  There is no evidence that she would benefit from any additional hormonal therapy or from adjuvant XRT.  Would not consider tamoxifen or anastrazole for chemoprevention since she has already had 5 years of adjuvant hormonal therapy.  I recommended that [facility], and I will be happy to see her again on a prn basis.  I spent 60 minutes reviewing the available records and evaluating the patient, out of which approximately 30 minutes were spent face to face with the patient in counseling and coordinating her care.    Her multiple questions were answered to her satisfaction.

## 2019-08-21 ENCOUNTER — OFFICE VISIT (OUTPATIENT)
Dept: FAMILY MEDICINE | Facility: CLINIC | Age: 54
End: 2019-08-21
Payer: COMMERCIAL

## 2019-08-21 VITALS
TEMPERATURE: 99 F | HEIGHT: 68 IN | SYSTOLIC BLOOD PRESSURE: 134 MMHG | RESPIRATION RATE: 18 BRPM | DIASTOLIC BLOOD PRESSURE: 84 MMHG | BODY MASS INDEX: 35.13 KG/M2 | WEIGHT: 231.81 LBS

## 2019-08-21 DIAGNOSIS — D05.11 DUCTAL CARCINOMA IN SITU (DCIS) OF RIGHT BREAST: ICD-10-CM

## 2019-08-21 DIAGNOSIS — G44.229 CHRONIC TENSION-TYPE HEADACHE, NOT INTRACTABLE: ICD-10-CM

## 2019-08-21 DIAGNOSIS — C50.911 MALIGNANT NEOPLASM OF RIGHT FEMALE BREAST, UNSPECIFIED ESTROGEN RECEPTOR STATUS, UNSPECIFIED SITE OF BREAST: Primary | ICD-10-CM

## 2019-08-21 PROCEDURE — 99999 PR PBB SHADOW E&M-EST. PATIENT-LVL III: CPT | Mod: PBBFAC,,, | Performed by: INTERNAL MEDICINE

## 2019-08-21 PROCEDURE — 99999 PR PBB SHADOW E&M-EST. PATIENT-LVL III: ICD-10-PCS | Mod: PBBFAC,,, | Performed by: INTERNAL MEDICINE

## 2019-08-21 PROCEDURE — 99213 PR OFFICE/OUTPT VISIT, EST, LEVL III, 20-29 MIN: ICD-10-PCS | Mod: S$GLB,,, | Performed by: INTERNAL MEDICINE

## 2019-08-21 PROCEDURE — 99213 OFFICE O/P EST LOW 20 MIN: CPT | Mod: S$GLB,,, | Performed by: INTERNAL MEDICINE

## 2019-08-21 RX ORDER — BUTALBITAL, ACETAMINOPHEN AND CAFFEINE 50; 325; 40 MG/1; MG/1; MG/1
CAPSULE ORAL
Qty: 30 CAPSULE | Refills: 1 | Status: SHIPPED | OUTPATIENT
Start: 2019-08-21 | End: 2019-10-22 | Stop reason: SDUPTHER

## 2019-08-21 RX ORDER — ACETAMINOPHEN 325 MG/1
325 TABLET ORAL EVERY 6 HOURS PRN
COMMUNITY
End: 2019-08-21 | Stop reason: ALTCHOICE

## 2019-08-21 NOTE — PROGRESS NOTES
[facility] Note    Chief Complaint      Chief Complaint   Patient presents with    Follow-up     hospital follow up        History of Present Illness      Alcira Selby is a 54 y.o. female who presents today for   Chief Complaint   Patient presents with    Follow-up     hospital follow up    .  Patient comes to appointment here for hiops f/u for newly diagnosed right breast cancer s/p right mastectomy with post op infection complication . She is doing better now will be taking ehr last dose of abx today . Is following with gen surg and oncologist as below    Problem List Items Addressed This Visit        Neuro    Chronic tension-type headache, not intractable    Overview     needs refill of fiorcet is workign well             Oncology    Malignant neoplasm of right female breast - Primary    Overview     Just completed mastectomy on July 17th , complicated with post surgical infection in august 13 is now on abx will be finished tomorrow . Dr vaughan surgeon , dr pineda oncology         Ductal carcinoma in situ (DCIS) of right breast            Past Medical History:  Past Medical History:   Diagnosis Date    Breast cancer     2004       Past Surgical History:  Past Surgical History:   Procedure Laterality Date    BIOPSY, LYMPH NODE, SENTINEL RIGHT Right 7/17/2019    [facility]    FOOT SURGERY Bilateral     Plantar Fascities    HYSTERECTOMY      Incision and Drainage - right chest wall Right 8/13/2019    [facility]    KNEE SURGERY Left     Total Knee Replacement    LYMPHADENECTOMY, AXILLARY RIGHT Right 7/17/2019    [facility]    MASTECTOMY      MASTECTOMY RIGHT Right 7/17/2019    [facility]    MYOMECTOMY      TONSILLECTOMY         Family History:  family history includes Cancer in her maternal aunt;  Hypertension in her father and mother.    Social History:  Social History     Socioeconomic History    Marital status:      Spouse name: Not on file    Number of children: Not on file    Years of education: Not on file    Highest education level: Not on file   Occupational History    Not on file   Social Needs    Financial resource strain: Not on file    Food insecurity:     Worry: Not on file     Inability: Not on file    Transportation needs:     Medical: Not on file     Non-medical: Not on file   Tobacco Use    Smoking status: Never Smoker    Smokeless tobacco: Never Used   Substance and Sexual Activity    Alcohol use: No     Frequency: Never    Drug use: No    Sexual activity: Not on file   Lifestyle    Physical activity:     Days per week: Not on file     Minutes per session: Not on file    Stress: Not on file   Relationships    Social connections:     Talks on phone: Not on file     Gets together: Not on file     Attends Mosque service: Not on file     Active member of club or organization: Not on file     Attends meetings of clubs or organizations: Not on file     Relationship status: Not on file   Other Topics Concern    Not on file   Social History Narrative    Not on file       Review of Systems:   Review of Systems   Constitutional: Negative for fever and weight loss.   HENT: Negative for congestion, hearing loss and sore throat.    Eyes: Negative for blurred vision.   Respiratory: Negative for cough and shortness of breath.    Cardiovascular: Negative for chest pain, palpitations, claudication and leg swelling.   Gastrointestinal: Negative for abdominal pain, constipation, diarrhea, heartburn, nausea and vomiting.   Genitourinary: Negative for dysuria.   Musculoskeletal: Negative for back pain and myalgias.   Skin: Negative for rash.   Neurological: Negative for focal weakness and headaches.   Psychiatric/Behavioral: Negative for depression, memory loss and suicidal ideas. The  patient is not nervous/anxious.          Medications:  Outpatient Encounter Medications as of 8/21/2019   Medication Sig Note Dispense Refill    butalbital-acetaminophen-caff -40 mg -40 mg Cap 1 tab po q4-6hrs prn headache  30 capsule 1    cyanocobalamin (VITAMIN B-12) 100 MCG tablet Take by mouth. 7/16/2019: Hold am of procedure       docusate sodium (COLACE) 100 MG capsule Take 100 mg by mouth 2 (two) times daily. 7/16/2019: Hold  am of procedure       doxycycline (VIBRA-TABS) 100 MG tablet Take 1 tablet (100 mg total) by mouth every 12 (twelve) hours. for 6 days  12 tablet 0    fluticasone (FLONASE) 50 mcg/actuation nasal spray 1 spray by Each Nare route once daily.       ondansetron (ZOFRAN) 4 MG tablet Take 1 tablet (4 mg total) by mouth every 8 (eight) hours as needed for Nausea.  10 tablet 3    oxybutynin (DITROPAN-XL) 10 MG 24 hr tablet TAKE 1 TABLET BY MOUTH ONCE DAILY 7/16/2019: Take as prescribed am of procedure  30 tablet 0    oxyCODONE-acetaminophen (PERCOCET) 5-325 mg per tablet Take 1 tablet by mouth every 6 (six) hours as needed for Pain.  20 tablet 0    triamcinolone acetonide 0.1% (KENALOG) 0.1 % cream APPLY AA BID PRN   0    vitamin B complex (SUPER B COMPLEX-B-12 ORAL) Take by mouth. 7/16/2019: Hold am of procedure       [DISCONTINUED] acetaminophen (TYLENOL) 325 MG tablet Take 325 mg by mouth every 6 (six) hours as needed for Pain.       [DISCONTINUED] butalbital-acetaminophen-caff -40 mg -40 mg Cap Take by mouth once daily. 7/16/2019: Hold am of procedure        No facility-administered encounter medications on file as of 8/21/2019.         Allergies:  Review of patient's allergies indicates:   Allergen Reactions    Sulfamethoxazole-trimethoprim Hives and Itching         Physical Exam      Vitals:    08/21/19 1013   BP: 134/84   Resp:    Temp:       Body mass index is 35.25 kg/m².    Physical Exam   Constitutional: She is oriented to person, place, and time.  She appears well-developed and well-nourished.   HENT:   Mouth/Throat: Oropharynx is clear and moist.   Eyes: Pupils are equal, round, and reactive to light. EOM are normal.   Neck: Normal range of motion. No thyromegaly present.   Cardiovascular: Normal rate and normal heart sounds. Exam reveals no gallop and no friction rub.   No murmur heard.  Pulmonary/Chest: Breath sounds normal.   Abdominal: Soft. Bowel sounds are normal.   Musculoskeletal: Normal range of motion.   Lymphadenopathy:     She has no cervical adenopathy.   Neurological: She is alert and oriented to person, place, and time. No cranial nerve deficit.   Skin: Skin is warm. No rash noted.        Psychiatric: She has a normal mood and affect. Her behavior is normal.        Laboratory:  CBC:  Recent Labs   Lab Result Units 08/13/19  0358 08/14/19  0359 08/15/19  0442   WBC K/uL 5.66 8.87 6.44   RBC M/uL 3.58* 3.51* 3.33*   Hemoglobin g/dL 10.5* 10.1* 9.8*   Hematocrit % 33.1* 32.6* 30.9*   Platelets K/uL 425* 446* 395*   Mean Corpuscular Volume fL 93 93 93   Mean Corpuscular Hemoglobin pg 29.3 28.8 29.4   Mean Corpuscular Hemoglobin Conc g/dL 31.7* 31.0* 31.7*     CMP:  Recent Labs   Lab Result Units 06/24/19  0921  08/10/19  2334 08/11/19  0421  08/15/19  0442   Glucose mg/dL 92   < > 104 101   < > 99   Calcium mg/dL 9.6   < > 9.5 9.1   < > 8.9   Albumin g/dL 3.8  --  3.5 3.0*  --   --    Total Protein g/dL 6.6  --  7.2 6.1  --   --    Sodium mmol/L 143   < > 140 141   < > 142   Potassium mmol/L 3.5   < > 3.8 3.2*   < > 3.7   CO2 mmol/L 27   < > 23 24   < > 26   Chloride mmol/L 107   < > 105 107   < > 107   BUN, Bld mg/dL 12   < > 14 12   < > 12   Alkaline Phosphatase U/L 92  --  82 67  --   --    ALT U/L 15  --  16 14  --   --    AST U/L 13  --  15 13  --   --    Total Bilirubin mg/dL 0.3  --  0.3 0.4  --   --     < > = values in this interval not displayed.     URINALYSIS:  No results for input(s): COLORU, CLARITYU, SPECGRAV, PHUR, PROTEINUA,  GLUCOSEU, BILIRUBINCON, BLOODU, WBCU, RBCU, BACTERIA, MUCUS, NITRITE, LEUKOCYTESUR, UROBILINOGEN, HYALINECASTS in the last 2160 hours.   LIPIDS:  Recent Labs   Lab Result Units 07/11/19  1407   TSH uIU/mL 0.769     TSH:  Recent Labs   Lab Result Units 07/11/19  1407   TSH uIU/mL 0.769     A1C:  No results for input(s): HGBA1C in the last 2160 hours.    Radiology:        Assessment:     Alcira Selby is a 54 y.o.female with:    Malignant neoplasm of right female breast, unspecified estrogen receptor status, unspecified site of breast    Ductal carcinoma in situ (DCIS) of right breast    Chronic tension-type headache, not intractable  -     butalbital-acetaminophen-caff -40 mg -40 mg Cap; 1 tab po q4-6hrs prn headache  Dispense: 30 capsule; Refill: 1          Plan:     As above, continue current medications and maintain follow up with specialists.  Return to clinic in 6 months.    Frederick W Dantagnan [facility] [place]

## 2019-08-29 ENCOUNTER — OFFICE VISIT (OUTPATIENT)
Dept: SURGERY | Facility: CLINIC | Age: 54
End: 2019-08-29
Payer: COMMERCIAL

## 2019-08-29 VITALS
HEART RATE: 76 BPM | TEMPERATURE: 98 F | BODY MASS INDEX: 35.15 KG/M2 | WEIGHT: 231.94 LBS | DIASTOLIC BLOOD PRESSURE: 103 MMHG | SYSTOLIC BLOOD PRESSURE: 174 MMHG | HEIGHT: 68 IN

## 2019-08-29 DIAGNOSIS — D05.11 DUCTAL CARCINOMA IN SITU (DCIS) OF RIGHT BREAST: Primary | ICD-10-CM

## 2019-08-29 PROCEDURE — 99024 POSTOP FOLLOW-UP VISIT: CPT | Mod: S$GLB,,, | Performed by: SURGERY

## 2019-08-29 PROCEDURE — 99024 PR POST-OP FOLLOW-UP VISIT: ICD-10-PCS | Mod: S$GLB,,, | Performed by: SURGERY

## 2019-08-29 PROCEDURE — 99999 PR PBB SHADOW E&M-EST. PATIENT-LVL III: CPT | Mod: PBBFAC,,, | Performed by: SURGERY

## 2019-08-29 PROCEDURE — 99999 PR PBB SHADOW E&M-EST. PATIENT-LVL III: ICD-10-PCS | Mod: PBBFAC,,, | Performed by: SURGERY

## 2019-09-01 NOTE — PROGRESS NOTES
The patient presents postop status post re-exploration and washout of her right chest wall abscess following prior completion mastectomy for stage 0 DCIS.  The patient had breast conservation surgery and radiotherapy in the remote past.    She completed her course of antibiotic therapy.  She denies any pain or tenderness on the chest wall this point and there are no clinical signs of infection.  The drain is draining a scant amount of thin transudative serous fluid.    With no signs of infection the drain was removed today along with the chest wall staples at the surgical site of the re-explored mastectomy site.    The patient will not be offered any adjuvant endocrine therapy after consultation with Dr. Wei.    She was given a prescription for a postmastectomy bra as well as a right breast prosthesis.  She will follow up with me in 6 months.  Rule refer her to the plastic surgery team in 3-4 months to discuss delayed autologous tissue reconstruction.  She continues to consider contralateral prophylactic mastectomy at the time of reconstruction as well.  Because of this she would definitely not choose to take any endocrine therapy for chemoprevention.

## 2019-09-06 RX ORDER — FLUTICASONE PROPIONATE 50 MCG
1 SPRAY, SUSPENSION (ML) NASAL DAILY
Qty: 1 BOTTLE | Refills: 5 | Status: SHIPPED | OUTPATIENT
Start: 2019-09-06 | End: 2020-04-06 | Stop reason: SDUPTHER

## 2019-09-10 LAB — FUNGUS SPEC CULT: NORMAL

## 2019-09-11 ENCOUNTER — CLINICAL SUPPORT (OUTPATIENT)
Dept: REHABILITATION | Facility: HOSPITAL | Age: 54
End: 2019-09-11
Attending: SURGERY
Payer: COMMERCIAL

## 2019-09-11 DIAGNOSIS — Z74.09 STIFFNESS DUE TO IMMOBILITY: ICD-10-CM

## 2019-09-11 DIAGNOSIS — M79.601 PAIN OF RIGHT UPPER EXTREMITY: ICD-10-CM

## 2019-09-11 DIAGNOSIS — Z91.89 AT RISK FOR LYMPHEDEMA: ICD-10-CM

## 2019-09-11 DIAGNOSIS — M25.60 STIFFNESS DUE TO IMMOBILITY: ICD-10-CM

## 2019-09-11 PROCEDURE — 97110 THERAPEUTIC EXERCISES: CPT | Mod: PN

## 2019-09-11 PROCEDURE — 97165 OT EVAL LOW COMPLEX 30 MIN: CPT | Mod: PN

## 2019-09-11 NOTE — PLAN OF CARE
[facility] Occupational Therapy  Re-Evaluation     Date: 6/25/2019  Patient: Alcira Selby  Chart Number: [id number]    Therapy Diagnosis:   Encounter Diagnoses   Name Primary?    At risk for lymphedema     Malignant neoplasm of right female breast, unspecified estrogen receptor status, unspecified site of breast Yes    Stiffness due to immobility     Pain of right upper extremity      Physician: Darin Vauhgan MD    Physician Orders: OT evaluate and treat  Medical Diagnosis: C50.911 (ICD-10-CM) - Malignant neoplasm of right female breast, unspecified estrogen receptor status, unspecified site of breast  Evaluation Date: 6/25/2019; re-eval 9/11/2019  Insurance Authorization period Expiration: 12/31/2019  Plan of Care Expiration Period: 11/8/2019    Visit # / Visits Authortized: 2 / 20 [id number]  Time In:100  Time Out: 150  Total Billable Time: 50 minutes  Re-Eval1 TE1    Precautions: Standard and cancer      Subjective   History of Present Illness: Alcira Selby is a 53 y.o. female with hx of R breast DCIS in 2004 s/p lumpectomy and XRT with re-excision of close margins that was negative, s/p completion of tamoxifen 2009. She presents with R breast DCIS, ER/IN +, nuclear grade III. 12:00 o'clock region anterior depth there are fine pleomorphic calcifications in a linear distribution measuring 17 mm in extent.     Surgery: Right Mastectomy with SLNBx 7/17/2019; Right chest wall infection and abscess, status post prior mastectomy for completion mastectomy for stage 0 DCIS without reconstruction.on 8/13/2109  Chemotherapy: none  Radiation: no longer requires  Chief complaint: Right chest tightness secondary to scar tissue    Patient's Goals for Therapy: to return to PLOF    Hand dominance: right    Pain:  Functional Pain Scale Rating 0-10:   3/10 on average  2/10 at best  5/10 at worst  Location: Right side of chest wall  Description: Dull  Aggravating Factors: Night Time  Easing Factors:  nothing    Occupation:  On disability: Was working however is not anymore secondary to 2 knee surgeries.     Functional Limitations/Social History:    Previous functional status includes: Independent with all ADLs.     Current FunctionalStatus   Home/Living environment : lives with their family      Limitation of Functional Status as follows:   ADLs/IADLs:     - Feeding: Independent    - Bathing: some difficulty with bathing    - Dressing/Grooming: some difficulty with overhead tasks    - Driving: pain with seatbelt across chest     Leisure: Independent    Past Medical History:   Diagnosis Date    Breast cancer     2004     Alcira Selby  has a past surgical history that includes Knee surgery (Left); Hysterectomy; Tonsillectomy; Myomectomy; and Foot surgery (Bilateral).    Alcira has a current medication list which includes the following prescription(s): butalbital-acetaminophen-caff -40 mg, cyanocobalamin, fluticasone propionate, meloxicam, oxybutynin, prednisone, prednisone, triamcinolone acetonide 0.1%, and vitamin b complex.    Review of patient's allergies indicates:   Allergen Reactions    Sulfamethoxazole-trimethoprim      Hives and Itching    Sulfa (sulfonamide antibiotics) Rash      Objective   Mental status :alert    Postural examination/scapula alignment: Rounded shoulder and Head forward    Skin Integrity:   Scar Location: Right mastectomy dense scar tissue  Appearance: healed  Signs of infection: none    Edema: minimal is axillary region    Axillary Web Syndrome/Cording:   Location: None noted  Degree of Cording: - (mild, moderate etc...)   Number of cords present: -    Sensation: reports numbness and tingling on right chest wall and axillary region        Range of Motion:   Shoulder  Right   Left  Pain/Dysfunction with Movement    AROM PROM MMT AROM PROM MMT    Flexion 160 130 130 5/5 140 WNL 5/5    Extension 65 50 WNL 5/5 60 WNL 5/5    Abduction 160 90 120 5/5 135 WNL 5/5   "  HorizAdduction 35 20 WNL 5/5 25 WNL 5/5    Internal rotation L4 S2 WNL 5/5 L4 WNL 5/5    ER at 90° abd 90 85 WNL 5/5 90 WNL 5/5    ER at 0° abd 80 80 WNL 5/5 80 WNL 5/5      ROM Comments: firm end feel    Painful Arc: None noted    Tenderness upon Palpation:      Negative: Infraspinatus Region, AC joint, Bicipital Groove and Supraspinatus Region    Scapular Control/Dyskinesis:    Normal / Subtle / Obvious  Comments    Left  Normal -    Right  Normal -     Baseline Measurements of BL UE's for early detection of Lymphedema:     LANDMARK RIGHT UE LEFT UE DIFFERENCE   E + 8" 37cm 37cm 0 cm   E + 6" 36 cm  34 cm 2cm   E + 4" 37 cm 35.5 cm 1.5 cm   E + 2" 35.5 cm 34 cm 1.5 cm   Elbow 29 cm 30.5 cm 1.5 cm   W+ 8" 29 cm 28.5 cm .5 cm   W +  6" 26.5 cm  26.5 cm 0 cm   W + 4" 22 cm 23.5 cm 1.5 cm   Wrist 16.5 cm 16 cm       .5 cm   DPC 20 cm 19 cm   1 cm   IP Thumb 6.5 cm  6.5 cm     0 cm     TREATMENT   Treatment Time In: 140  Treatment Time Out: 150  Total Treatment time separate from Evaluation: 10 minutes  Alcira received therapeutic exercises to develop strength, endurance, ROM, flexibility and posture for 10 minutes including:  Shoulder flexion with the wand 5 repetitions, Sidelying Abduction 5 repetitions, Sidelying External Rotation 5 repetitions, Standing Dowel Abduction 5 repetitions, Theraband pull downs 5 repetitions, Theraband Rows 5 repetitions, Theraband External Rotation 5 repetitions, Theraband Internal Rotation 5 repetitions, corner pectoralis stretch 1/30", Internal rotation pulley stretch 1/30",  Lumbar stretch 1/30", Lower Trunk rotation with Butterfly stretch 3/30"     Education provided:   -- role of therapy in multi - disciplinary team, goals for therapy  - Pt was educated in lymphedema etiology and management plans.    - Pt was provided with written risk reductions and precautions for managing lymphedema.     Written Home Exercises Provided: Patient instructed to cont prior HEP.  Exercises were " reviewed and Alcira was able to demonstrate them prior to the end of the session.  Alcira demonstrated good  understanding of the education provided.     See EMR under Patient Instructions for exercises provided 9/11/2019.    CMS Impairment/Limitation/Restriction for FOTO Shoulder Survey    Therapist reviewed FOTO scores for Alcira Selby on 9/11/2019.   FOTO documents entered into [employer] - see Media section.    Limitation Score: 33%  Category: Self Care    Current : CJ = at least 20% but < 40% impaired, limited or restricted  Goal: CI = at least 1% but < 20% impaired, limited or restricted       Assessment     Alcira Selby is a 54 y.o. female referred to outpatient occupational therapy and presents with a medical diagnosis of Right breast cancer , resulting in Decreased ROM, Decreased  strength, Decreased pinch strength, Decreased muscle strength, Increased pain, Edema, Joint Stiffness, Scar Adhesions and Diminished/Impaired Sensation and demonstrates limitations as described in the chart below. Following medical record review it is determined that pt will benefit from occupational therapy services in order to maximize pain free and/or functional use of right shoulder. The following goals were discussed with the patient and patient is in agreement with them as to be addressed in the treatment plan. The patient's rehab potential is Good.     Anticipated barriers to occupational therapy: none    Pt has no cultural, educational or language barriers to learning provided.    Profile and History Assessment of Occupational Performance Level of Clinical Decision Making Complexity Score   Occupational Profile:   Alcira Selby is a 53 y.o. female who lives with their family and is currently home-maker. Alcira Selby is Independent with his/her daily functional abilities. His/her main goal for therapy is to return to PLOF.     Comorbidities:   high BMI and history of cancer    Medical and Therapy History  Review:   Expanded               Performance Deficits    Physical:  No Deficits    Cognitive:  No Deficits    Psychosocial:    No Deficits     Clinical Decision Making:  low    Assessment Process:  Problem-Focused Assessments    Modification/Need for Assistance:  Not Necessary    Intervention Selection:  Several Treatment Options       low  Based on PMHX, co morbidities , data from assessments and functional level of assistance required with task and clinical presentation directly impacting function.       The following goals were discussed with the patient and patient is in agreement with them as to be addressed in the treatment plan.     Goals:   Short Term goals: 4 weeks  1. Patient will demonstrate 100% understanding of lymphedema risk reduction practices to include self monitoring for lymphedema.   2. Patient will demonstrate independence with Home Exercise program established.   3. Pt will demonstrate increased AROM on right shoulder by 15 degrees grossly for improved performance with overhead ADL's  4. Pt will report 3/10 pain on right shoulder at worst  5. Patient will be able to achieve less than or equal to 25% on FOTO shoulder survey demonstrating overall improved functional ability with upper extremity.     Long Term Goals: 8 weeks   1. Pt will demonstrate AROM WNL grossly on right shoulder for [place] with IADL's and ADL's  2. Pt will demonstrate MMT WNL grossly on right shoulder to improve tolerance to all functional activities pain free.  3. Pt will demonstrate full/maximized tissue mobility to increase ROM and promote healthy tissue to be pain free at discharge.   4. Pt will report decrease in overall worst pain to 1-2/10 at discharge.   5. Patient will be able to achieve less than or equal to 10% on FOTO shoulder survey demonstrating overall improved functional ability with upper extremity.   6. Patient will report compliance with walking program 5x week for 10 min each day to improve overall  cardiovascular function and decrease cancer related fatigue at discharge.    Plan     Certification Period/Plan of care expiration: 9/11/2019 to 11/8/2019.    Outpatient Occupational Therapy 1 times weekly for 8 weeks to include the following interventions: Manual therapy/joint mobilizations, Modalities for pain management, Therapeutic exercises/activities., Strengthening, Edema Control, Scar Management and Energy Conservation.      NARGIS Hernandez

## 2019-09-11 NOTE — PATIENT INSTRUCTIONS
Lumbar Rotation    Put your arms out to the side - 90 degrees - your arms should be in a T formation.   Feet on floor, slowly rock knees from side to side in small, pain-free range of motion. Allow lower back to rotate slightly, but keep shoulders flat on ground. Hold 15 seconds. Repeat each side. Repeat 5 times per side. Do 1-2 sessions per day.        Gentle Lower trunk rotation with butterfly stretch - only bring legs 1/2 the distance of the picture   3 x 10 reps each side

## 2019-09-18 NOTE — PROGRESS NOTES
"  Occupational Therapy Daily Treatment Note     Date: 9/19/2019  Name: Alcira Selby  Clinic Number: [id number]    Therapy Diagnosis:   Encounter Diagnoses   Name Primary?    At risk for lymphedema     Stiffness due to immobility     Pain of right upper extremity      Physician: Darin Vaughan MD    Physician Orders: OT evaluate and treat  Medical Diagnosis: C50.911 (ICD-10-CM) - Malignant neoplasm of right female breast, unspecified estrogen receptor status, unspecified site of breast  Evaluation Date: 6/25/2019; re-eval 9/11/2019  Insurance Authorization period Expiration: 12/31/2019  Plan of Care Expiration Period: 11/8/2019     Visit # / Visits Authortized: 3 / 20 FOTO67%  Time In:1100  Time Out: 1200  Total Billable Time: 60 minutes  MT1 TE3     Precautions: Standard and cancer    Subjective     Pt reports: "I've been doing to the exercises and massaging the incision."  she was compliant with home exercise program given last session.   Response to previous treatment:low pain report  Functional change: able to progress with weight with exercises    Pain: 3/10  Location: right incision area on chest and triceps       Objective     Alcira received the following manual therapy techniques for 10 minutes:   -consisting of patient supine for Right shoulder lateral telescoping, upper trapezius soft tissue mobilization, pectoralis lift, subscapularis myofascial release and stretch, PROM with endrange stretching, glenohumeral joint inferior anterior posterior glides grade I-III, gentle shoulder oscillations    Alcira received therapeutic exercises for 50 minutes including:  Exercises    UBE forward/reverse 3min each direction @120rpms   PROM (Right) Shoulder Flexion/Abduction/Internal rotation/External Rotation 10x   Supine pectoralis stretch 3/30"   Supine dowel Flexion 3  2/15   Sidelying Abduction 2  2/15   Sidelying External Rotation 2  2/15   pulleys 3'   Wall slides ball  2/15       Corner pectoralis " "stretch 3/30"   Theraband Lats green  2/15   Theraband Rows green  2/15   Theraband Internal Rotation/External Rotation green  2/15   Internal Rotation pulley stretch 3/30"     Home Exercises and Education Provided     Education provided:   - Progress towards goals     Written Home Exercises Provided: Patient instructed to cont prior HEP.  Exercises were reviewed and Alcira was able to demonstrate them prior to the end of the session.  Alcira demonstrated good  understanding of the HEP provided.   .   See EMR under Patient Instructions for exercises provided 9/11/2019     Assessment     Pt with good participation today. Noted some tightness at end ranges with PROM however improved some from initial evaluation. Pt able to increase weight with mat exercises without c/o and good technique. She is a good candidate for therapy and very motivated.     Alcira is progressing well towards her goals and there are no updates to goals at this time. Pt prognosis is Good.     Pt will continue to benefit from skilled outpatient occupational therapy to address the deficits listed in the problem list on initial evaluation provide pt/family education and to maximize pt's level of independence in the home and community environment.     Anticipated barriers to occupational therapy: none    Pt's spiritual, cultural and educational needs considered and pt agreeable to plan of care and goals.    Goals:  Short Term goals: 4 weeks  1. Patient will demonstrate 100% understanding of lymphedema risk reduction practices to include self monitoring for lymphedema-not met.   2. Patient will demonstrate independence with Home Exercise program established.-met, ongoing  3. Pt will demonstrate increased AROM on right shoulder by 15 degrees grossly for improved performance with overhead ADL's -Not met, progressing  4. Pt will report 3/10 pain on right shoulder at worst -Not met, progressing  5. Patient will be able to achieve less than or " equal to 25% on FOTO shoulder survey demonstrating overall improved functional ability with upper extremity. -Not met, progressing     Long Term Goals: 8 weeks   1. Pt will demonstrate AROM WNL grossly on right shoulder for [place] with IADL's and ADL's -Not met, progressing  2. Pt will demonstrate MMT WNL grossly on right shoulder to improve tolerance to all functional activities pain free. -Not met, progressing  3. Pt will demonstrate full/maximized tissue mobility to increase ROM and promote healthy tissue to be pain free at discharge.  -Not met, progressing  4. Pt will report decrease in overall worst pain to 1-2/10 at discharge. -Not met, progressing  5. Patient will be able to achieve less than or equal to 10% on FOTO shoulder survey demonstrating overall improved functional ability with upper extremity. -Not met, progressing  6. Patient will report compliance with walking program 5x week for 10 min each day to improve overall cardiovascular function and decrease cancer related fatigue at discharge. -Not met, progressing    Plan   Continue with OT POC  Updates/Grading for next session: progress with ROM and strengthening       NARGIS Hernandez

## 2019-09-19 ENCOUNTER — CLINICAL SUPPORT (OUTPATIENT)
Dept: REHABILITATION | Facility: HOSPITAL | Age: 54
End: 2019-09-19
Attending: SURGERY
Payer: COMMERCIAL

## 2019-09-19 DIAGNOSIS — M25.60 STIFFNESS DUE TO IMMOBILITY: ICD-10-CM

## 2019-09-19 DIAGNOSIS — Z91.89 AT RISK FOR LYMPHEDEMA: ICD-10-CM

## 2019-09-19 DIAGNOSIS — Z74.09 STIFFNESS DUE TO IMMOBILITY: ICD-10-CM

## 2019-09-19 DIAGNOSIS — M79.601 PAIN OF RIGHT UPPER EXTREMITY: ICD-10-CM

## 2019-09-19 PROCEDURE — 97110 THERAPEUTIC EXERCISES: CPT | Mod: PN

## 2019-09-19 PROCEDURE — 97140 MANUAL THERAPY 1/> REGIONS: CPT | Mod: PN

## 2019-09-25 ENCOUNTER — CLINICAL SUPPORT (OUTPATIENT)
Dept: REHABILITATION | Facility: HOSPITAL | Age: 54
End: 2019-09-25
Attending: SURGERY
Payer: COMMERCIAL

## 2019-09-25 DIAGNOSIS — Z74.09 STIFFNESS DUE TO IMMOBILITY: ICD-10-CM

## 2019-09-25 DIAGNOSIS — Z91.89 AT RISK FOR LYMPHEDEMA: ICD-10-CM

## 2019-09-25 DIAGNOSIS — M25.60 STIFFNESS DUE TO IMMOBILITY: ICD-10-CM

## 2019-09-25 DIAGNOSIS — M79.601 PAIN OF RIGHT UPPER EXTREMITY: ICD-10-CM

## 2019-09-25 PROCEDURE — 97110 THERAPEUTIC EXERCISES: CPT | Mod: PN

## 2019-09-25 PROCEDURE — 97140 MANUAL THERAPY 1/> REGIONS: CPT | Mod: PN

## 2019-09-25 NOTE — PROGRESS NOTES
"  Occupational Therapy Daily Treatment Note     Date: 9/25/2019  Name: Alcira Selby  Clinic Number: [id number]    Therapy Diagnosis:   Encounter Diagnoses   Name Primary?    At risk for lymphedema     Stiffness due to immobility     Pain of right upper extremity      Physician: Darin Vaughan MD    Physician Orders: OT evaluate and treat  Medical Diagnosis: C50.911 (ICD-10-CM) - Malignant neoplasm of right female breast, unspecified estrogen receptor status, unspecified site of breast  Evaluation Date: 6/25/2019; re-eval 9/11/2019  Insurance Authorization period Expiration: 12/31/2019  Plan of Care Expiration Period: 11/8/2019     Visit # / Visits Authortized: 4 / 20 FOTO67%  Time In:100  Time Out: 200  Total Billable Time: 60 minutes  MT1 TE3     Precautions: Standard and cancer    Subjective     Pt reports: "I'm just tired today."  she was compliant with home exercise program given 9/11/2019   Response to previous treatment:low pain report  Functional change: able to progress with weight with exercises    Pain: 3/10  Location: right incision area on chest and triceps       Objective     Alcira received the following manual therapy techniques for 10 minutes:   -consisting of patient supine for Right shoulder lateral telescoping, upper trapezius soft tissue mobilization, pectoralis lift, subscapularis myofascial release and stretch, PROM with endrange stretching, glenohumeral joint inferior anterior posterior glides grade I-III, gentle shoulder oscillations    Alcira received therapeutic exercises for 50 minutes including:  Exercises    UBE forward/reverse 3min each direction @120rpms   PROM (Right) Shoulder Flexion/Abduction/Internal rotation/External Rotation 10x   Supine pectoralis stretch 3/30"   Supine dowel Flexion 3  2/15   Sidelying Abduction 2  2/15   Sidelying External Rotation 2  2/15   pulleys 3'   Wall slides ball  2/15       Corner pectoralis stretch 3/30"   Theraband Lats green  2/15 " "  Theraband Rows green  2/15   Theraband Internal Rotation/External Rotation green  2/15   Internal Rotation pulley stretch 3/30"     Home Exercises and Education Provided     Education provided:   - Progress towards goals     Written Home Exercises Provided: Patient instructed to cont prior HEP.  Exercises were reviewed and Alcira was able to demonstrate them prior to the end of the session.  Alcira demonstrated good  understanding of the HEP provided.   .   See EMR under Patient Instructions for exercises provided 9/11/2019     Assessment     Pt with good participation today. Noted some tightness at end ranges with PROM however continues to improve each session. Did not increase weight today however endurance improved from previous session.  She is a good candidate for therapy and very motivated.     Alcira is progressing well towards her goals and there are no updates to goals at this time. Pt prognosis is Good.     Pt will continue to benefit from skilled outpatient occupational therapy to address the deficits listed in the problem list on initial evaluation provide pt/family education and to maximize pt's level of independence in the home and community environment.     Anticipated barriers to occupational therapy: none    Pt's spiritual, cultural and educational needs considered and pt agreeable to plan of care and goals.    Goals:  Short Term goals: 4 weeks  1. Patient will demonstrate 100% understanding of lymphedema risk reduction practices to include self monitoring for lymphedema-not met.   2. Patient will demonstrate independence with Home Exercise program established.-met, ongoing  3. Pt will demonstrate increased AROM on right shoulder by 15 degrees grossly for improved performance with overhead ADL's -Not met, progressing  4. Pt will report 3/10 pain on right shoulder at worst -Not met, progressing  5. Patient will be able to achieve less than or equal to 25% on FOTO shoulder survey " demonstrating overall improved functional ability with upper extremity. -Not met, progressing     Long Term Goals: 8 weeks   1. Pt will demonstrate AROM WNL grossly on right shoulder for [place] with IADL's and ADL's -Not met, progressing  2. Pt will demonstrate MMT WNL grossly on right shoulder to improve tolerance to all functional activities pain free. -Not met, progressing  3. Pt will demonstrate full/maximized tissue mobility to increase ROM and promote healthy tissue to be pain free at discharge.  -Not met, progressing  4. Pt will report decrease in overall worst pain to 1-2/10 at discharge. -Not met, progressing  5. Patient will be able to achieve less than or equal to 10% on FOTO shoulder survey demonstrating overall improved functional ability with upper extremity. -Not met, progressing  6. Patient will report compliance with walking program 5x week for 10 min each day to improve overall cardiovascular function and decrease cancer related fatigue at discharge. -Not met, progressing    Plan   Continue with OT POC  Updates/Grading for next session: progress with ROM and strengthening       NARGIS Hernandez

## 2019-10-02 ENCOUNTER — CLINICAL SUPPORT (OUTPATIENT)
Dept: REHABILITATION | Facility: HOSPITAL | Age: 54
End: 2019-10-02
Attending: SURGERY
Payer: COMMERCIAL

## 2019-10-02 DIAGNOSIS — Z91.89 AT RISK FOR LYMPHEDEMA: ICD-10-CM

## 2019-10-02 DIAGNOSIS — M79.601 PAIN OF RIGHT UPPER EXTREMITY: ICD-10-CM

## 2019-10-02 DIAGNOSIS — M25.60 STIFFNESS DUE TO IMMOBILITY: ICD-10-CM

## 2019-10-02 DIAGNOSIS — Z74.09 STIFFNESS DUE TO IMMOBILITY: ICD-10-CM

## 2019-10-02 PROCEDURE — 97110 THERAPEUTIC EXERCISES: CPT | Mod: PN

## 2019-10-02 PROCEDURE — 97140 MANUAL THERAPY 1/> REGIONS: CPT | Mod: PN

## 2019-10-02 NOTE — PROGRESS NOTES
"  Occupational Therapy Daily Treatment Note     Date: 10/2/2019  Name: Alcira Selby  Clinic Number: [id number]    Therapy Diagnosis:   Encounter Diagnoses   Name Primary?    At risk for lymphedema     Stiffness due to immobility     Pain of right upper extremity      Physician: Darin Vaughan MD    Physician Orders: OT evaluate and treat  Medical Diagnosis: C50.911 (ICD-10-CM) - Malignant neoplasm of right female breast, unspecified estrogen receptor status, unspecified site of breast  Evaluation Date: 6/25/2019; re-eval 9/11/2019  Insurance Authorization period Expiration: 12/31/2019  Plan of Care Expiration Period: 11/8/2019     Visit # / Visits Authortized: 5 / 20 FOTO:23%  Time In:100  Time Out: 150  Total Billable Time: 50 minutes  MT1 TE3     Precautions: Standard and cancer    Subjective     Pt reports: "I'm massaging my scar its getting softer but it's still hard."  she was compliant with home exercise program given 9/11/2019   Response to previous treatment:low pain report  Functional change: able to progress with weight with exercises    Pain: 2/10  Location: right incision area on chest and triceps       Objective     Alcira received the following manual therapy techniques for 10 minutes:   -consisting of patient supine for Right shoulder lateral telescoping, upper trapezius soft tissue mobilization, pectoralis lift, subscapularis myofascial release and stretch, PROM with endrange stretching, glenohumeral joint inferior anterior posterior glides grade I-III, gentle shoulder oscillations    Alcira received therapeutic exercises for 40 minutes including:  Exercises    UBE forward/reverse 3min each direction @120rpms   PROM (Right) Shoulder Flexion/Abduction/Internal rotation/External Rotation 10x   Supine pectoralis stretch 3/30"   Supine dowel Flexion 3  2/15   Sidelying Abduction 2  2/15   Sidelying External Rotation 2  2/15   pulleys 3'   Wall slides ball  2/15       Corner pectoralis " "stretch 3/30"   Theraband Lats green  2/15   Theraband Rows green  2/15   Theraband Internal Rotation/External Rotation green  2/15   Internal Rotation pulley stretch 3/30"     Range of Motion:   Shoulder  preop/10/2 Right     Left   Pain/Dysfunction with Movement     AROM PROM MMT AROM PROM MMT     Flexion 160/145(+15) WNL 5/5 140 WNL 5/5     Extension 65/65(+15) WNL 5/5 60 WNL 5/5     Abduction 160/140(+50) WNL 5/5 135 WNL 5/5     HorizAdduction 35/25(+5) WNL 5/5 25 WNL 5/5     Internal rotation L4/L4(+S2) WNL 5/5 L4 WNL 5/5     ER at 90° abd 90/90(+5) WNL 5/5 90 WNL 5/5     ER at 0° abd 80/90(+10) WNL 5/5 80 WNL 5/5        Home Exercises and Education Provided     Education provided:   - Progress towards goals     Written Home Exercises Provided: Patient instructed to cont prior HEP.  Exercises were reviewed and Alcira was able to demonstrate them prior to the end of the session.  Alcira demonstrated good  understanding of the HEP provided.     See EMR under Patient Instructions for exercises provided 9/11/2019     Assessment     Pt with good participation today. She is demonstrating overall improved ROM and is approaching pre-op status. Pain remains low.  She is a good candidate for therapy and very motivated.     Alcira is progressing well towards her goals and there are no updates to goals at this time. Pt prognosis is Good.     Pt will continue to benefit from skilled outpatient occupational therapy to address the deficits listed in the problem list on initial evaluation provide pt/family education and to maximize pt's level of independence in the home and community environment.     Anticipated barriers to occupational therapy: none    Pt's spiritual, cultural and educational needs considered and pt agreeable to plan of care and goals.    Goals:  Short Term goals: 4 weeks  1. Patient will demonstrate 100% understanding of lymphedema risk reduction practices to include self monitoring for " lymphedema-met  2. Patient will demonstrate independence with Home Exercise program established.-met  3. Pt will demonstrate increased AROM on right shoulder by 15 degrees grossly for improved performance with overhead ADL's -met  4. Pt will report 3/10 pain on right shoulder at worst -met  5. Patient will be able to achieve less than or equal to 25% on FOTO shoulder survey demonstrating overall improved functional ability with upper extremity. -met     Long Term Goals: 8 weeks   1. Pt will demonstrate AROM WNL grossly on right shoulder for [place] with IADL's and ADL's -Not met, progressing  2. Pt will demonstrate MMT WNL grossly on right shoulder to improve tolerance to all functional activities pain free. -Not met, progressing  3. Pt will demonstrate full/maximized tissue mobility to increase ROM and promote healthy tissue to be pain free at discharge.  -Not met, progressing  4. Pt will report decrease in overall worst pain to 1-2/10 at discharge. -Not met, progressing  5. Patient will be able to achieve less than or equal to 10% on FOTO shoulder survey demonstrating overall improved functional ability with upper extremity. -Not met, progressing  6. Patient will report compliance with walking program 5x week for 10 min each day to improve overall cardiovascular function and decrease cancer related fatigue at discharge. -Not met, progressing    Plan   Continue with OT POC  Updates/Grading for next session: progress with ROM and strengthening anticipate discharge at the end of the month.      NARGIS Hernandez

## 2019-10-03 ENCOUNTER — TELEPHONE (OUTPATIENT)
Dept: HEMATOLOGY/ONCOLOGY | Facility: CLINIC | Age: 54
End: 2019-10-03

## 2019-10-03 NOTE — TELEPHONE ENCOUNTER
Contacted patient to verify she was okay with this information being released as no release of information was obtained.   Patient verbally agreed.   Information faxed.

## 2019-10-03 NOTE — TELEPHONE ENCOUNTER
----- Message from Kaci Baird MA sent at 10/3/2019  9:47 AM CDT -----  Contact: Анна ([facility])  Анна need patient last office visit note.    Анна # [phone] ext# [phone]  Fax# [phone]

## 2019-10-07 ENCOUNTER — CLINICAL SUPPORT (OUTPATIENT)
Dept: REHABILITATION | Facility: HOSPITAL | Age: 54
End: 2019-10-07
Attending: SURGERY
Payer: COMMERCIAL

## 2019-10-07 DIAGNOSIS — M25.60 STIFFNESS DUE TO IMMOBILITY: ICD-10-CM

## 2019-10-07 DIAGNOSIS — Z91.89 AT RISK FOR LYMPHEDEMA: ICD-10-CM

## 2019-10-07 DIAGNOSIS — Z74.09 STIFFNESS DUE TO IMMOBILITY: ICD-10-CM

## 2019-10-07 DIAGNOSIS — M79.601 PAIN OF RIGHT UPPER EXTREMITY: ICD-10-CM

## 2019-10-07 PROCEDURE — 97140 MANUAL THERAPY 1/> REGIONS: CPT | Mod: PN

## 2019-10-07 PROCEDURE — 97110 THERAPEUTIC EXERCISES: CPT | Mod: PN

## 2019-10-07 NOTE — PROGRESS NOTES
"  Occupational Therapy Daily Treatment Note     Date: 10/7/2019  Name: Alcira Selby  Clinic Number: [id number]    Therapy Diagnosis:   Encounter Diagnoses   Name Primary?    At risk for lymphedema     Stiffness due to immobility     Pain of right upper extremity      Physician: Darin Vaughan MD    Physician Orders: OT evaluate and treat  Medical Diagnosis: C50.911 (ICD-10-CM) - Malignant neoplasm of right female breast, unspecified estrogen receptor status, unspecified site of breast  Evaluation Date: 6/25/2019; re-eval 9/11/2019  Insurance Authorization period Expiration: 12/31/2019  Plan of Care Expiration Period: 11/8/2019     Visit # / Visits Authortized: 6 / 20 FOTO:23%  Time In:900  Time Out:950  Total Billable Time: 50 minutes  MT1 TE3     Precautions: Standard and cancer    Subjective     Pt reports: "I just have a little numbness and tingling today, no pain. "  she was compliant with home exercise program given 9/11/2019   Response to previous treatment:low pain report  Functional change: able to progress with weight with exercises    Pain: 2/10  Location: right incision area on chest and triceps       Objective     Alcira received the following manual therapy techniques for 10 minutes:   -consisting of patient supine for Right shoulder lateral telescoping, upper trapezius soft tissue mobilization, pectoralis lift, subscapularis myofascial release and stretch, PROM with endrange stretching, glenohumeral joint inferior anterior posterior glides grade I-III, gentle shoulder oscillations    Alcira received therapeutic exercises for 40 minutes including:  Exercises    UBE forward/reverse 3min each direction @120rpms   PROM (Right) Shoulder Flexion/Abduction/Internal rotation/External Rotation 10x   Supine pectoralis stretch 3/30"   Supine dowel Flexion 4  2/15   Sidelying Abduction 2  2/15   Sidelying External Rotation 2  2/15   pulleys 3'   Wall slides ball  2/15       Corner pectoralis stretch " "3/30"   Theraband Lats green  2/15   Theraband Rows green  2/15   Theraband Internal Rotation/External Rotation green  2/15   Internal Rotation pulley stretch 3/30"     Home Exercises and Education Provided     Education provided:   - Progress towards goals     Written Home Exercises Provided: Patient instructed to cont prior HEP.  Exercises were reviewed and Alcira was able to demonstrate them prior to the end of the session.  Alcira demonstrated good  understanding of the HEP provided.     See EMR under Patient Instructions for exercises provided 9/11/2019     Assessment     Pt with good participation today. Pt able to increase weight with dowel exercises today. Pain remains low. Continues to report numbness and tingling.     Alcira is progressing well towards her goals and there are no updates to goals at this time. Pt prognosis is Good.     Pt will continue to benefit from skilled outpatient occupational therapy to address the deficits listed in the problem list on initial evaluation provide pt/family education and to maximize pt's level of independence in the home and community environment.     Anticipated barriers to occupational therapy: none    Pt's spiritual, cultural and educational needs considered and pt agreeable to plan of care and goals.    Goals:  Short Term goals: 4 weeks  1. Patient will demonstrate 100% understanding of lymphedema risk reduction practices to include self monitoring for lymphedema-met  2. Patient will demonstrate independence with Home Exercise program established.-met  3. Pt will demonstrate increased AROM on right shoulder by 15 degrees grossly for improved performance with overhead ADL's -met  4. Pt will report 3/10 pain on right shoulder at worst -met  5. Patient will be able to achieve less than or equal to 25% on FOTO shoulder survey demonstrating overall improved functional ability with upper extremity. -met     Long Term Goals: 8 weeks   1. Pt will demonstrate AROM " WNL grossly on right shoulder for [place] with IADL's and ADL's -Not met, progressing  2. Pt will demonstrate MMT WNL grossly on right shoulder to improve tolerance to all functional activities pain free. -Not met, progressing  3. Pt will demonstrate full/maximized tissue mobility to increase ROM and promote healthy tissue to be pain free at discharge.  -Not met, progressing  4. Pt will report decrease in overall worst pain to 1-2/10 at discharge. -Not met, progressing  5. Patient will be able to achieve less than or equal to 10% on FOTO shoulder survey demonstrating overall improved functional ability with upper extremity. -Not met, progressing  6. Patient will report compliance with walking program 5x week for 10 min each day to improve overall cardiovascular function and decrease cancer related fatigue at discharge. -Not met, progressing    Plan   Continue with OT POC  Updates/Grading for next session: progress with ROM and strengthening anticipate discharge at the end of the month.      NARGIS Hernandez

## 2019-10-16 ENCOUNTER — CLINICAL SUPPORT (OUTPATIENT)
Dept: REHABILITATION | Facility: HOSPITAL | Age: 54
End: 2019-10-16
Attending: SURGERY
Payer: COMMERCIAL

## 2019-10-16 DIAGNOSIS — M25.60 STIFFNESS DUE TO IMMOBILITY: ICD-10-CM

## 2019-10-16 DIAGNOSIS — Z91.89 AT RISK FOR LYMPHEDEMA: ICD-10-CM

## 2019-10-16 DIAGNOSIS — M79.601 PAIN OF RIGHT UPPER EXTREMITY: ICD-10-CM

## 2019-10-16 DIAGNOSIS — Z74.09 STIFFNESS DUE TO IMMOBILITY: ICD-10-CM

## 2019-10-16 PROCEDURE — 97110 THERAPEUTIC EXERCISES: CPT | Mod: PN

## 2019-10-16 PROCEDURE — 97140 MANUAL THERAPY 1/> REGIONS: CPT | Mod: PN

## 2019-10-16 NOTE — PROGRESS NOTES
"  Occupational Therapy Daily Treatment Note     Date: 10/16/2019  Name: Alcira Selby  Clinic Number: [id number]    Therapy Diagnosis:   Encounter Diagnoses   Name Primary?    At risk for lymphedema     Stiffness due to immobility     Pain of right upper extremity      Physician: Darin Vaughan MD    Physician Orders: OT evaluate and treat  Medical Diagnosis: C50.911 (ICD-10-CM) - Malignant neoplasm of right female breast, unspecified estrogen receptor status, unspecified site of breast  Evaluation Date: 6/25/2019; re-eval 9/11/2019  Insurance Authorization period Expiration: 12/31/2019  Plan of Care Expiration Period: 11/8/2019     Visit # / Visits Authortized: 7 / 20 FOTO:23%  Time In:1253  Time Out:150  Total Billable Time: 57 minutes  MT1 TE3     Precautions: Standard and cancer    Subjective     Pt reports: "I feel like I'm progressing."  she was compliant with home exercise program given 9/11/2019   Response to previous treatment:low pain report  Functional change: able to progress with weight with exercises    Pain: 2/10  Location: right incision area on chest and triceps       Objective     Alcira received the following manual therapy techniques for 10 minutes:   -consisting of patient supine for Right shoulder lateral telescoping, upper trapezius soft tissue mobilization, pectoralis lift, subscapularis myofascial release and stretch, PROM with endrange stretching, glenohumeral joint inferior anterior posterior glides grade I-III, gentle shoulder oscillations    Alcira received therapeutic exercises for 47 minutes including:  Exercises    UBE forward/reverse 3min each direction @120rpms   PROM (Right) Shoulder Flexion/Abduction/Internal rotation/External Rotation 10x   Supine pectoralis stretch 3/30"   Supine dowel Flexion 4  2/15   Sidelying Abduction 2  2/15   Sidelying External Rotation 2  2/15   pulleys 3'   Wall slides ball  2/15       Corner pectoralis stretch 3/30"   Theraband Lats " "green  2/15   Theraband Rows green  2/15   Theraband Internal Rotation/External Rotation green  2/15   Internal Rotation pulley stretch 3/30"     Home Exercises and Education Provided     Education provided:   - Progress towards goals     Written Home Exercises Provided: Patient instructed to cont prior HEP.  Exercises were reviewed and Alcira was able to demonstrate them prior to the end of the session.  Alcira demonstrated good  understanding of the HEP provided.     See EMR under Patient Instructions for exercises provided 9/11/2019     Assessment     Pt with good participation today. Pain remains low. Still has end range pain and stiffness mostly with flexion and abduction. Pt without c/o during exercises. She continues to requires minimal cueing.     Alcira is progressing well towards her goals and there are no updates to goals at this time. Pt prognosis is Good.     Pt will continue to benefit from skilled outpatient occupational therapy to address the deficits listed in the problem list on initial evaluation provide pt/family education and to maximize pt's level of independence in the home and community environment.     Anticipated barriers to occupational therapy: none    Pt's spiritual, cultural and educational needs considered and pt agreeable to plan of care and goals.    Goals:  Short Term goals: 4 weeks  1. Patient will demonstrate 100% understanding of lymphedema risk reduction practices to include self monitoring for lymphedema-met  2. Patient will demonstrate independence with Home Exercise program established.-met  3. Pt will demonstrate increased AROM on right shoulder by 15 degrees grossly for improved performance with overhead ADL's -met  4. Pt will report 3/10 pain on right shoulder at worst -met  5. Patient will be able to achieve less than or equal to 25% on FOTO shoulder survey demonstrating overall improved functional ability with upper extremity. -met     Long Term Goals: 8 weeks "   1. Pt will demonstrate AROM WNL grossly on right shoulder for [place] with IADL's and ADL's -Not met, progressing  2. Pt will demonstrate MMT WNL grossly on right shoulder to improve tolerance to all functional activities pain free. -Not met, progressing  3. Pt will demonstrate full/maximized tissue mobility to increase ROM and promote healthy tissue to be pain free at discharge.  -Not met, progressing  4. Pt will report decrease in overall worst pain to 1-2/10 at discharge. -Not met, progressing  5. Patient will be able to achieve less than or equal to 10% on FOTO shoulder survey demonstrating overall improved functional ability with upper extremity. -Not met, progressing  6. Patient will report compliance with walking program 5x week for 10 min each day to improve overall cardiovascular function and decrease cancer related fatigue at discharge. -Not met, progressing    Plan   Continue with OT POC  Updates/Grading for next session: progress with ROM and strengthening anticipate discharge at the end of the month.      NARGIS Hernandez

## 2019-10-22 DIAGNOSIS — G44.229 CHRONIC TENSION-TYPE HEADACHE, NOT INTRACTABLE: ICD-10-CM

## 2019-10-22 RX ORDER — BUTALBITAL, ACETAMINOPHEN AND CAFFEINE 50; 325; 40 MG/1; MG/1; MG/1
CAPSULE ORAL
Qty: 30 CAPSULE | Refills: 1 | Status: SHIPPED | OUTPATIENT
Start: 2019-10-22 | End: 2019-11-29 | Stop reason: SDUPTHER

## 2019-10-23 ENCOUNTER — CLINICAL SUPPORT (OUTPATIENT)
Dept: REHABILITATION | Facility: HOSPITAL | Age: 54
End: 2019-10-23
Attending: SURGERY
Payer: COMMERCIAL

## 2019-10-23 DIAGNOSIS — Z74.09 STIFFNESS DUE TO IMMOBILITY: ICD-10-CM

## 2019-10-23 DIAGNOSIS — Z91.89 AT RISK FOR LYMPHEDEMA: ICD-10-CM

## 2019-10-23 DIAGNOSIS — M25.60 STIFFNESS DUE TO IMMOBILITY: ICD-10-CM

## 2019-10-23 DIAGNOSIS — M79.601 PAIN OF RIGHT UPPER EXTREMITY: ICD-10-CM

## 2019-10-23 PROCEDURE — 97140 MANUAL THERAPY 1/> REGIONS: CPT | Mod: PN

## 2019-10-23 PROCEDURE — 97110 THERAPEUTIC EXERCISES: CPT | Mod: PN

## 2019-10-23 NOTE — PROGRESS NOTES
"  Occupational Therapy Daily Treatment Note     Date: 10/23/2019  Name: Alcira Selby  Clinic Number: [id number]    Therapy Diagnosis:   Encounter Diagnoses   Name Primary?    At risk for lymphedema     Stiffness due to immobility     Pain of right upper extremity      Physician: Darin Vaughan MD    Physician Orders: OT evaluate and treat  Medical Diagnosis: C50.911 (ICD-10-CM) - Malignant neoplasm of right female breast, unspecified estrogen receptor status, unspecified site of breast  Evaluation Date: 6/25/2019; re-eval 9/11/2019  Insurance Authorization period Expiration: 12/31/2019  Plan of Care Expiration Period: 11/8/2019     Visit # / Visits Authortized: 8/ 20 FOTO:23%  Time In:105  Time Out:200  Total Billable Time: 55 minutes  MT1 TE3     Precautions: Standard and cancer    Subjective     Pt reports: "So next week is my last week."  she was compliant with home exercise program given 9/11/2019   Response to previous treatment:pain free today  Functional change: able to progress with weight with exercises    Pain: 0/10  Location: right incision area on chest and triceps       Objective     Alcira received the following manual therapy techniques for 10 minutes:   -consisting of patient supine for Right shoulder lateral telescoping, upper trapezius soft tissue mobilization, pectoralis lift, subscapularis myofascial release and stretch, PROM with endrange stretching, glenohumeral joint inferior anterior posterior glides grade I-III, gentle shoulder oscillations    Alcira received therapeutic exercises for 45 minutes including:  Exercises    UBE forward/reverse 3min each direction @120rpms   PROM (Right) Shoulder Flexion/Abduction/Internal rotation/External Rotation 10x   Supine pectoralis stretch 3/30"   Supine dowel Flexion 4  2/15   Sidelying Abduction 2  2/15   Sidelying External Rotation 2  2/15   pulleys 3'   Wall slides ball  2/15       Corner pectoralis stretch 3/30"   Theraband Lats " "green  2/15   Theraband Rows green  2/15   Theraband Internal Rotation/External Rotation green  2/15   Internal Rotation pulley stretch 3/30"     Home Exercises and Education Provided     Education provided:   - Progress towards goals     Written Home Exercises Provided: Patient instructed to cont prior HEP.  Exercises were reviewed and Alcira was able to demonstrate them prior to the end of the session.  Alcira demonstrated good  understanding of the HEP provided.     See EMR under Patient Instructions for exercises provided 9/11/2019     Assessment     Pt with good participation today. Pain free throughout session. Still has end range pain and stiffness mostly with flexion and abduction. Pt without c/o during exercises. Required rest breaks. She continues to requires minimal cueing.     Alcira is progressing well towards her goals and there are no updates to goals at this time. Pt prognosis is Good.     Pt will continue to benefit from skilled outpatient occupational therapy to address the deficits listed in the problem list on initial evaluation provide pt/family education and to maximize pt's level of independence in the home and community environment.     Anticipated barriers to occupational therapy: none    Pt's spiritual, cultural and educational needs considered and pt agreeable to plan of care and goals.    Goals:  Short Term goals: 4 weeks  1. Patient will demonstrate 100% understanding of lymphedema risk reduction practices to include self monitoring for lymphedema-met  2. Patient will demonstrate independence with Home Exercise program established.-met  3. Pt will demonstrate increased AROM on right shoulder by 15 degrees grossly for improved performance with overhead ADL's -met  4. Pt will report 3/10 pain on right shoulder at worst -met  5. Patient will be able to achieve less than or equal to 25% on FOTO shoulder survey demonstrating overall improved functional ability with upper extremity. " -met     Long Term Goals: 8 weeks   1. Pt will demonstrate AROM WNL grossly on right shoulder for [place] with IADL's and ADL's -Not met, progressing  2. Pt will demonstrate MMT WNL grossly on right shoulder to improve tolerance to all functional activities pain free. -Not met, progressing  3. Pt will demonstrate full/maximized tissue mobility to increase ROM and promote healthy tissue to be pain free at discharge.  -Not met, progressing  4. Pt will report decrease in overall worst pain to 1-2/10 at discharge. -Not met, progressing  5. Patient will be able to achieve less than or equal to 10% on FOTO shoulder survey demonstrating overall improved functional ability with upper extremity. -Not met, progressing  6. Patient will report compliance with walking program 5x week for 10 min each day to improve overall cardiovascular function and decrease cancer related fatigue at discharge. -Not met, progressing    Plan   Continue with OT POC  Updates/Grading for next session: progress with ROM and strengthening, reassess and discharge next session      NARGIS Hernandez

## 2019-10-29 ENCOUNTER — TELEPHONE (OUTPATIENT)
Dept: FAMILY MEDICINE | Facility: CLINIC | Age: 54
End: 2019-10-29

## 2019-10-29 DIAGNOSIS — Z12.11 SCREEN FOR COLON CANCER: Primary | ICD-10-CM

## 2019-10-29 NOTE — TELEPHONE ENCOUNTER
----- Message from Alyssia Rios sent at 10/29/2019  2:17 PM CDT -----  Contact: [phone]/self  Patient requesting to speak with you regarding scheduling a colonoscopy for her and her . Please advise.

## 2019-10-29 NOTE — TELEPHONE ENCOUNTER
Called pt she wants an order for a colonoscopy. Her last one was 12/21/2011 ( I put in separate message for her )

## 2019-10-30 ENCOUNTER — CLINICAL SUPPORT (OUTPATIENT)
Dept: REHABILITATION | Facility: HOSPITAL | Age: 54
End: 2019-10-30
Attending: SURGERY
Payer: COMMERCIAL

## 2019-10-30 DIAGNOSIS — M79.601 PAIN OF RIGHT UPPER EXTREMITY: ICD-10-CM

## 2019-10-30 DIAGNOSIS — M25.60 STIFFNESS DUE TO IMMOBILITY: ICD-10-CM

## 2019-10-30 DIAGNOSIS — Z74.09 STIFFNESS DUE TO IMMOBILITY: ICD-10-CM

## 2019-10-30 DIAGNOSIS — Z91.89 AT RISK FOR LYMPHEDEMA: ICD-10-CM

## 2019-10-30 PROCEDURE — 97140 MANUAL THERAPY 1/> REGIONS: CPT | Mod: PN

## 2019-10-30 PROCEDURE — 97110 THERAPEUTIC EXERCISES: CPT | Mod: PN

## 2019-11-01 ENCOUNTER — TELEPHONE (OUTPATIENT)
Dept: FAMILY MEDICINE | Facility: CLINIC | Age: 54
End: 2019-11-01

## 2019-11-01 DIAGNOSIS — Z12.11 SCREEN FOR COLON CANCER: Primary | ICD-10-CM

## 2019-11-05 ENCOUNTER — TELEPHONE (OUTPATIENT)
Dept: SURGERY | Facility: CLINIC | Age: 54
End: 2019-11-05

## 2019-11-05 DIAGNOSIS — D05.11 DUCTAL CARCINOMA IN SITU (DCIS) OF RIGHT BREAST: Primary | ICD-10-CM

## 2019-11-05 NOTE — TELEPHONE ENCOUNTER
Spoke with patient in regards to needing a prescription for post-mastectomy bra and Right breast prosthesis.  Orders put in per Dr. Vaughan's note. Expressed to patient Dr. Vaughan is not in the office this week. He will sign her scripts on Tuesday of next week. I will then fax scripts to [facility] as advised per patient. Patient verbalized understanding.

## 2019-11-07 DIAGNOSIS — Z12.11 SPECIAL SCREENING FOR MALIGNANT NEOPLASMS, COLON: Primary | ICD-10-CM

## 2019-11-07 RX ORDER — SODIUM, POTASSIUM,MAG SULFATES 17.5-3.13G
1 SOLUTION, RECONSTITUTED, ORAL ORAL DAILY
Qty: 1 KIT | Refills: 0 | Status: SHIPPED | OUTPATIENT
Start: 2019-11-07 | End: 2019-11-09

## 2019-11-22 ENCOUNTER — TELEPHONE (OUTPATIENT)
Dept: GASTROENTEROLOGY | Facility: CLINIC | Age: 54
End: 2019-11-22

## 2019-11-22 NOTE — TELEPHONE ENCOUNTER
Spoke to patient about scheduling a screening colonoscopy. Patient is already scheduled at main campus.

## 2019-11-29 DIAGNOSIS — G44.229 CHRONIC TENSION-TYPE HEADACHE, NOT INTRACTABLE: ICD-10-CM

## 2019-11-29 RX ORDER — BUTALBITAL, ACETAMINOPHEN AND CAFFEINE 50; 325; 40 MG/1; MG/1; MG/1
CAPSULE ORAL
Qty: 30 CAPSULE | Refills: 1 | Status: SHIPPED | OUTPATIENT
Start: 2019-11-29 | End: 2019-12-19 | Stop reason: SDUPTHER

## 2019-12-02 ENCOUNTER — TELEPHONE (OUTPATIENT)
Dept: FAMILY MEDICINE | Facility: CLINIC | Age: 54
End: 2019-12-02

## 2019-12-02 NOTE — TELEPHONE ENCOUNTER
----- Message from Radha oH sent at 12/2/2019  9:05 AM CST -----  No.  [phone]   Patient called last week.   Please call in Fioricet 50/325mg.    [facility] on [address]

## 2019-12-03 ENCOUNTER — TELEPHONE (OUTPATIENT)
Dept: FAMILY MEDICINE | Facility: CLINIC | Age: 54
End: 2019-12-03

## 2019-12-03 ENCOUNTER — DOCUMENTATION ONLY (OUTPATIENT)
Dept: SURGERY | Facility: CLINIC | Age: 54
End: 2019-12-03

## 2019-12-03 NOTE — TELEPHONE ENCOUNTER
Patient called office about having to pay out of pocket for medication. Patient states that they need a prior auth done since it is a new medicine. The pharmacy told her that she can pay out of pocket and if the office does the prior auth within three days they will refund her the amount.

## 2019-12-03 NOTE — TELEPHONE ENCOUNTER
----- Message from Alyssia Rios sent at 12/3/2019  3:22 PM CST -----  Contact: [phone]  Patient requesting to speak with you regarding medication butalbital-acetaminophen-caff -40 mg -40 mg Cap.

## 2019-12-05 ENCOUNTER — TELEPHONE (OUTPATIENT)
Dept: SURGERY | Facility: CLINIC | Age: 54
End: 2019-12-05

## 2019-12-12 ENCOUNTER — ANESTHESIA EVENT (OUTPATIENT)
Dept: ENDOSCOPY | Facility: HOSPITAL | Age: 54
End: 2019-12-12
Payer: COMMERCIAL

## 2019-12-12 ENCOUNTER — ANESTHESIA (OUTPATIENT)
Dept: ENDOSCOPY | Facility: HOSPITAL | Age: 54
End: 2019-12-12
Payer: COMMERCIAL

## 2019-12-12 ENCOUNTER — HOSPITAL ENCOUNTER (OUTPATIENT)
Facility: HOSPITAL | Age: 54
Discharge: HOME | End: 2019-12-12
Attending: COLON & RECTAL SURGERY | Admitting: COLON & RECTAL SURGERY
Payer: COMMERCIAL

## 2019-12-12 VITALS
RESPIRATION RATE: 18 BRPM | TEMPERATURE: 98 F | HEIGHT: 68 IN | OXYGEN SATURATION: 100 % | HEART RATE: 69 BPM | DIASTOLIC BLOOD PRESSURE: 84 MMHG | WEIGHT: 225 LBS | BODY MASS INDEX: 34.1 KG/M2 | SYSTOLIC BLOOD PRESSURE: 150 MMHG

## 2019-12-12 DIAGNOSIS — Z12.11 ENCOUNTER FOR SCREENING COLONOSCOPY: ICD-10-CM

## 2019-12-12 PROCEDURE — 88305 TISSUE EXAM BY PATHOLOGIST: ICD-10-PCS | Mod: 26,,, | Performed by: PATHOLOGY

## 2019-12-12 PROCEDURE — 45385 COLONOSCOPY W/LESION REMOVAL: CPT | Performed by: COLON & RECTAL SURGERY

## 2019-12-12 PROCEDURE — 27201012 HC FORCEPS, HOT/COLD, DISP: Performed by: COLON & RECTAL SURGERY

## 2019-12-12 PROCEDURE — E9220 PRA ENDO ANESTHESIA: HCPCS | Mod: PT,,, | Performed by: NURSE ANESTHETIST, CERTIFIED REGISTERED

## 2019-12-12 PROCEDURE — 88305 TISSUE EXAM BY PATHOLOGIST: CPT | Mod: 26,,, | Performed by: PATHOLOGY

## 2019-12-12 PROCEDURE — 27201089 HC SNARE, DISP (ANY): Performed by: COLON & RECTAL SURGERY

## 2019-12-12 PROCEDURE — 45385 PR COLONOSCOPY,REMV LESN,SNARE: ICD-10-PCS | Mod: 33,,, | Performed by: COLON & RECTAL SURGERY

## 2019-12-12 PROCEDURE — 45380 COLONOSCOPY AND BIOPSY: CPT | Performed by: COLON & RECTAL SURGERY

## 2019-12-12 PROCEDURE — 37000008 HC ANESTHESIA 1ST 15 MINUTES: Performed by: COLON & RECTAL SURGERY

## 2019-12-12 PROCEDURE — 45380 COLONOSCOPY AND BIOPSY: CPT | Mod: 59,,, | Performed by: COLON & RECTAL SURGERY

## 2019-12-12 PROCEDURE — 63600175 PHARM REV CODE 636 W HCPCS: Performed by: NURSE ANESTHETIST, CERTIFIED REGISTERED

## 2019-12-12 PROCEDURE — 45385 COLONOSCOPY W/LESION REMOVAL: CPT | Mod: 33,,, | Performed by: COLON & RECTAL SURGERY

## 2019-12-12 PROCEDURE — 63600175 PHARM REV CODE 636 W HCPCS: Performed by: COLON & RECTAL SURGERY

## 2019-12-12 PROCEDURE — 37000009 HC ANESTHESIA EA ADD 15 MINS: Performed by: COLON & RECTAL SURGERY

## 2019-12-12 PROCEDURE — 63600175 PHARM REV CODE 636 W HCPCS: Performed by: ANESTHESIOLOGY

## 2019-12-12 PROCEDURE — 88305 TISSUE EXAM BY PATHOLOGIST: CPT | Performed by: PATHOLOGY

## 2019-12-12 PROCEDURE — 45380 PR COLONOSCOPY,BIOPSY: ICD-10-PCS | Mod: 59,,, | Performed by: COLON & RECTAL SURGERY

## 2019-12-12 PROCEDURE — E9220 PRA ENDO ANESTHESIA: ICD-10-PCS | Mod: PT,,, | Performed by: NURSE ANESTHETIST, CERTIFIED REGISTERED

## 2019-12-12 RX ORDER — LIDOCAINE HCL/PF 100 MG/5ML
SYRINGE (ML) INTRAVENOUS
Status: DISCONTINUED | OUTPATIENT
Start: 2019-12-12 | End: 2019-12-12

## 2019-12-12 RX ORDER — PROPOFOL 10 MG/ML
VIAL (ML) INTRAVENOUS CONTINUOUS PRN
Status: DISCONTINUED | OUTPATIENT
Start: 2019-12-12 | End: 2019-12-12

## 2019-12-12 RX ORDER — SODIUM CHLORIDE 9 MG/ML
INJECTION, SOLUTION INTRAVENOUS CONTINUOUS
Status: DISCONTINUED | OUTPATIENT
Start: 2019-12-12 | End: 2019-12-12 | Stop reason: HOSPADM

## 2019-12-12 RX ORDER — ONDANSETRON 2 MG/ML
4 INJECTION INTRAMUSCULAR; INTRAVENOUS ONCE
Status: COMPLETED | OUTPATIENT
Start: 2019-12-12 | End: 2019-12-12

## 2019-12-12 RX ORDER — PROPOFOL 10 MG/ML
VIAL (ML) INTRAVENOUS
Status: DISCONTINUED | OUTPATIENT
Start: 2019-12-12 | End: 2019-12-12

## 2019-12-12 RX ORDER — ONDANSETRON 2 MG/ML
4 INJECTION INTRAMUSCULAR; INTRAVENOUS ONCE
Status: DISCONTINUED | OUTPATIENT
Start: 2019-12-12 | End: 2019-12-12

## 2019-12-12 RX ADMIN — SODIUM CHLORIDE: 0.9 INJECTION, SOLUTION INTRAVENOUS at 09:12

## 2019-12-12 RX ADMIN — PROPOFOL 50 MG: 10 INJECTION, EMULSION INTRAVENOUS at 11:12

## 2019-12-12 RX ADMIN — PROPOFOL 175 MCG/KG/MIN: 10 INJECTION, EMULSION INTRAVENOUS at 11:12

## 2019-12-12 RX ADMIN — ONDANSETRON 4 MG: 2 INJECTION INTRAMUSCULAR; INTRAVENOUS at 10:12

## 2019-12-12 RX ADMIN — LIDOCAINE HYDROCHLORIDE 100 MG: 20 INJECTION, SOLUTION INTRAVENOUS at 11:12

## 2019-12-12 NOTE — ANESTHESIA PREPROCEDURE EVALUATION
12/12/2019  Alcira Selby is a 54 y.o., female.  Past Medical History:   Diagnosis Date    Breast cancer     2004     Past Surgical History:   Procedure Laterality Date    AXILLARY NODE DISSECTION Right 7/17/2019    Procedure: LYMPHADENECTOMY, AXILLARY RIGHT;  Surgeon: Darin Vaughan MD;  Location: [address];  Service: General;  Laterality: Right;    FOOT SURGERY Bilateral     Plantar Fascities    HYSTERECTOMY      INCISION AND DRAINAGE Right 8/13/2019    Procedure: Incision and Drainage - right chest wall;  Surgeon: Darin Vaughan MD;  Location: [address];  Service: General;  Laterality: Right;    KNEE SURGERY Left     Total Knee Replacement    MASTECTOMY      MASTECTOMY Right 7/17/2019    Procedure: MASTECTOMY RIGHT;  Surgeon: Darin Vaughan MD;  Location: [address];  Service: General;  Laterality: Right;    MYOMECTOMY      SENTINEL LYMPH NODE BIOPSY Right 7/17/2019    Procedure: BIOPSY, LYMPH NODE, SENTINEL RIGHT;  Surgeon: Darin Vaughan MD;  Location: [address];  Service: General;  Laterality: Right;    TONSILLECTOMY       Anesthesia Evaluation    I have reviewed the Patient Summary Reports.    I have reviewed the Nursing Notes.   I have reviewed the Medications.     Review of Systems  Anesthesia Hx:  No problems with previous Anesthesia   Denies Personal Hx of Anesthesia complications.   Hematology/Oncology:  Hematology Normal   Oncology Normal     EENT/Dental:EENT/Dental Normal   Cardiovascular:  Cardiovascular Normal     Pulmonary:  Pulmonary Normal    Renal/:  Renal/ Normal     Hepatic/GI:  Hepatic/GI Normal    Musculoskeletal:  Musculoskeletal Normal    Neurological:  Neurology Normal    Endocrine:  Endocrine Normal    Dermatological:  Skin Normal    Psych:  Psychiatric Normal           Physical Exam  General:  Well nourished     Airway/Jaw/Neck:  Airway Findings: Mouth Opening: Normal Tongue: Normal  General Airway Assessment: Adult  Mallampati: II  TM Distance: Normal, at least 6 cm  Jaw/Neck Findings:  Neck ROM: Normal ROM     Eyes/Ears/Nose:  EYES/EARS/NOSE FINDINGS: Normal   Dental:  Dental Findings: In tact   Chest/Lungs:  Chest/Lungs Clear    Heart/Vascular:  Heart Findings: Normal Heart murmur: negative    Abdomen:  Abdomen Findings: Normal      Mental Status:  Mental Status Findings: Normal        Anesthesia Plan  Type of Anesthesia, risks & benefits discussed:  Anesthesia Type:  general  Patient's Preference:   Intra-op Monitoring Plan: standard ASA monitors  Intra-op Monitoring Plan Comments:   Post Op Pain Control Plan: IV/PO Opioids PRN  Post Op Pain Control Plan Comments:   Induction:   IV  Beta Blocker:  Patient is not currently on a Beta-Blocker (No further documentation required).       Informed Consent: Patient understands risks and agrees with Anesthesia plan.  Questions answered. Anesthesia consent signed with patient.  ASA Score: 2     Day of Surgery Review of History & Physical:  There are no significant changes.  H&P update referred to the provider.         Ready For Surgery From Anesthesia Perspective.

## 2019-12-12 NOTE — TRANSFER OF CARE
"Anesthesia Transfer of Care Note    Patient: Alcira Selby    Procedure(s) Performed: Procedure(s) (LRB):  COLONOSCOPY (N/A)    Patient location: PACU    Anesthesia Type: general    Transport from OR: Transported from OR on room air with adequate spontaneous ventilation    Post pain: adequate analgesia    Post assessment: no apparent anesthetic complications and tolerated procedure well    Post vital signs: stable    Level of consciousness: awake, alert and oriented    Nausea/Vomiting: no nausea/vomiting    Complications: none    Transfer of care protocol was followed      Last vitals:   Visit Vitals  BP (!) 173/94   Pulse 60   Temp 36.7 °C (98.1 °F)   Resp 15   Ht 5' 8" (1.727 m)   Wt 102.1 kg (225 lb)   SpO2 100%   BMI 34.21 kg/m²     "

## 2019-12-12 NOTE — DISCHARGE INSTRUCTIONS

## 2019-12-12 NOTE — ANESTHESIA POSTPROCEDURE EVALUATION
Anesthesia Post Evaluation    Patient: Alcira Selby    Procedure(s) Performed: Procedure(s) (LRB):  COLONOSCOPY (N/A)    Final Anesthesia Type: general    Patient location during evaluation: GI PACU  Patient participation: Yes- Able to Participate  Level of consciousness: awake and alert and oriented  Post-procedure vital signs: reviewed and stable  Pain management: adequate  Airway patency: patent    PONV status at discharge: No PONV  Anesthetic complications: no      Cardiovascular status: hemodynamically stable  Respiratory status: unassisted, spontaneous ventilation and room air  Hydration status: euvolemic  Follow-up not needed.          Vitals Value Taken Time   /84 12/12/2019 12:24 PM   Temp 36.5 °C (97.7 °F) 12/12/2019 11:54 AM   Pulse 69 12/12/2019 12:24 PM   Resp 18 12/12/2019 12:24 PM   SpO2 100 % 12/12/2019 12:24 PM         Event Time     Out of Recovery 12:24:36          Pain/Alton Score: Alton Score: 10 (12/12/2019 12:09 PM)

## 2019-12-12 NOTE — [employer]ATION PATIENT INSTRUCTIONS
Discharge Summary/Instructions after an Endoscopic Procedure  Patient Name: Alcira Selby  Patient MRN: [id number]  Patient YOB: 1965 Thursday, December 12, 2019  Estephanie Pratt MD  RESTRICTIONS:  During your procedure today, you received medications for sedation.  These   medications may affect your judgment, balance and coordination.  Therefore,   for 24 hours, you have the following restrictions:   - DO NOT drive a car, operate machinery, make legal/financial decisions,   sign important papers or drink alcohol.    ACTIVITY:  Today: no heavy lifting, straining or running due to procedural   sedation/anesthesia.  The following day: return to full activity including work.  DIET:  Eat and drink normally unless instructed otherwise.     TREATMENT FOR COMMON SIDE EFFECTS:  - Mild abdominal pain, nausea, belching, bloating or excessive gas:  rest,   eat lightly and use a heating pad.  - Sore Throat: treat with throat lozenges and/or gargle with warm salt   water.  - Because air was used during the procedure, expelling large amounts of air   from your rectum or belching is normal.  - If a bowel prep was taken, you may not have a bowel movement for 1-3 days.    This is normal.  SYMPTOMS TO WATCH FOR AND REPORT TO YOUR PHYSICIAN:  1. Abdominal pain or bloating, other than gas cramps.  2. Chest pain.  3. Back pain.  4. Signs of infection such as: chills or fever occurring within 24 hours   after the procedure.  5. Rectal bleeding, which would show as bright red, maroon, or black stools.   (A tablespoon of blood from the rectum is not serious, especially if   hemorrhoids are present.)  6. Vomiting.  7. Weakness or dizziness.  GO DIRECTLY TO THE NEAREST EMERGENCY ROOM IF YOU HAVE ANY OF THE FOLLOWING:      Difficulty breathing              Chills and/or fever over 101 F   Persistent vomiting and/or vomiting blood   Severe abdominal pain   Severe chest pain   Black, tarry stools   Bleeding- more than one  tablespoon   Any other symptom or condition that you feel may need urgent attention  Your doctor recommends these additional instructions:  If any biopsies were taken, your doctors clinic will contact you in 1 to 2   weeks with any results.  - Discharge patient to home.   - Resume previous diet.   - Continue present medications.   - Await pathology results.   - Repeat colonoscopy date to be determined after pending pathology results   are reviewed for surveillance.   - Return to referring physician.  For questions, problems or results please call your physician - Estephanie Pratt MD at Work:  [phone].  [facility], EMERGENCY ROOM PHONE NUMBER: [phone]  IF A COMPLICATION OR EMERGENCY SITUATION ARISES AND YOU ARE UNABLE TO REACH   YOUR PHYSICIAN - GO DIRECTLY TO THE EMERGENCY ROOM.  Estephanie Pratt MD  12/12/2019 11:48:00 AM  This report has been verified and signed electronically.  [employer]

## 2019-12-19 ENCOUNTER — TELEPHONE (OUTPATIENT)
Dept: FAMILY MEDICINE | Facility: CLINIC | Age: 54
End: 2019-12-19

## 2019-12-19 DIAGNOSIS — G44.229 CHRONIC TENSION-TYPE HEADACHE, NOT INTRACTABLE: ICD-10-CM

## 2019-12-19 RX ORDER — BUTALBITAL, ACETAMINOPHEN AND CAFFEINE 50; 325; 40 MG/1; MG/1; MG/1
CAPSULE ORAL
Qty: 30 CAPSULE | Refills: 1 | Status: SHIPPED | OUTPATIENT
Start: 2019-12-19 | End: 2020-01-09 | Stop reason: SDUPTHER

## 2019-12-19 NOTE — TELEPHONE ENCOUNTER
----- Message from Yanni Sanchez sent at 12/19/2019  8:49 AM CST -----  Contact: patient  Type:  RX Refill Request    Who Called: Alcira  Refill or New Rx:refill  RX Name and Strength: butalbital-acetaminophen-caff -40 mg -40 mg Cap  How is the patient currently taking it? (ex. 1XDay):   Is this a 30 day or 90 day RX: 30 day  Preferred Pharmacy with phone number: [facility] #[id number] - [place], [place] - [address] AT [facility]  Local or Mail Order: local   Ordering Provider: Dr. Cutler  Would the patient rather a call back or a response via [facility]? Call back  Best Call Back Number:[phone]  Additional Information: no

## 2019-12-24 LAB
FINAL PATHOLOGIC DIAGNOSIS: NORMAL
GROSS: NORMAL

## 2020-01-01 NOTE — TELEPHONE ENCOUNTER
----- Message from Jania Aguillon sent at 11/29/2019 12:07 PM CST -----  Contact: Self [phone]  Patient is calling to get refills on her medication sent to [facility] #[id number] - [place], [place] - [address] AT [facility] [address] [facility] [address] [phone] (Phone) [phone] (Fax)    1. butalbital-acetaminophen-caff -40 mg -40 mg Cap 30 capsule    Support Present

## 2020-01-09 ENCOUNTER — OFFICE VISIT (OUTPATIENT)
Dept: FAMILY MEDICINE | Facility: CLINIC | Age: 55
End: 2020-01-09
Payer: MEDICARE

## 2020-01-09 ENCOUNTER — TELEPHONE (OUTPATIENT)
Dept: CARDIOLOGY | Facility: CLINIC | Age: 55
End: 2020-01-09

## 2020-01-09 VITALS
RESPIRATION RATE: 18 BRPM | BODY MASS INDEX: 35.05 KG/M2 | OXYGEN SATURATION: 97 % | SYSTOLIC BLOOD PRESSURE: 140 MMHG | WEIGHT: 231.25 LBS | DIASTOLIC BLOOD PRESSURE: 82 MMHG | TEMPERATURE: 99 F | HEIGHT: 68 IN | HEART RATE: 67 BPM

## 2020-01-09 DIAGNOSIS — Z91.89 AT RISK FOR CORONARY ARTERY DISEASE: ICD-10-CM

## 2020-01-09 DIAGNOSIS — Z01.818 PREOP EXAM FOR INTERNAL MEDICINE: Primary | ICD-10-CM

## 2020-01-09 DIAGNOSIS — R94.31 ABNORMAL EKG: Primary | ICD-10-CM

## 2020-01-09 DIAGNOSIS — Z01.818 PRE-OP TESTING: ICD-10-CM

## 2020-01-09 DIAGNOSIS — D05.11 DUCTAL CARCINOMA IN SITU (DCIS) OF RIGHT BREAST: ICD-10-CM

## 2020-01-09 DIAGNOSIS — G44.229 CHRONIC TENSION-TYPE HEADACHE, NOT INTRACTABLE: ICD-10-CM

## 2020-01-09 DIAGNOSIS — R94.31 ABNORMAL EKG: ICD-10-CM

## 2020-01-09 LAB
ALBUMIN SERPL BCP-MCNC: 4.2 G/DL (ref 3.5–5.2)
ALP SERPL-CCNC: 91 U/L (ref 55–135)
ALT SERPL W/O P-5'-P-CCNC: 13 U/L (ref 10–44)
ANION GAP SERPL CALC-SCNC: 8 MMOL/L (ref 8–16)
APTT BLDCRRT: 26 SEC (ref 21–32)
AST SERPL-CCNC: 15 U/L (ref 10–40)
BASOPHILS # BLD AUTO: 0.05 K/UL (ref 0–0.2)
BASOPHILS NFR BLD: 1.2 % (ref 0–1.9)
BILIRUB SERPL-MCNC: 0.3 MG/DL (ref 0.1–1)
BUN SERPL-MCNC: 13 MG/DL (ref 6–20)
CALCIUM SERPL-MCNC: 9.4 MG/DL (ref 8.7–10.5)
CHLORIDE SERPL-SCNC: 106 MMOL/L (ref 95–110)
CO2 SERPL-SCNC: 28 MMOL/L (ref 23–29)
CREAT SERPL-MCNC: 0.8 MG/DL (ref 0.5–1.4)
DIFFERENTIAL METHOD: ABNORMAL
EOSINOPHIL # BLD AUTO: 0.1 K/UL (ref 0–0.5)
EOSINOPHIL NFR BLD: 1.6 % (ref 0–8)
ERYTHROCYTE [DISTWIDTH] IN BLOOD BY AUTOMATED COUNT: 14.7 % (ref 11.5–14.5)
EST. GFR  (AFRICAN AMERICAN): >60 ML/MIN/1.73 M^2
EST. GFR  (NON AFRICAN AMERICAN): >60 ML/MIN/1.73 M^2
GLUCOSE SERPL-MCNC: 79 MG/DL (ref 70–110)
HCT VFR BLD AUTO: 42.4 % (ref 37–48.5)
HGB BLD-MCNC: 13.1 G/DL (ref 12–16)
IMM GRANULOCYTES # BLD AUTO: 0.01 K/UL (ref 0–0.04)
IMM GRANULOCYTES NFR BLD AUTO: 0.2 % (ref 0–0.5)
INR PPP: 1 (ref 0.8–1.2)
LYMPHOCYTES # BLD AUTO: 2.4 K/UL (ref 1–4.8)
LYMPHOCYTES NFR BLD: 56.5 % (ref 18–48)
MCH RBC QN AUTO: 28.5 PG (ref 27–31)
MCHC RBC AUTO-ENTMCNC: 30.9 G/DL (ref 32–36)
MCV RBC AUTO: 92 FL (ref 82–98)
MONOCYTES # BLD AUTO: 0.3 K/UL (ref 0.3–1)
MONOCYTES NFR BLD: 7.2 % (ref 4–15)
NEUTROPHILS # BLD AUTO: 1.4 K/UL (ref 1.8–7.7)
NEUTROPHILS NFR BLD: 33.3 % (ref 38–73)
NRBC BLD-RTO: 0 /100 WBC
PLATELET # BLD AUTO: 272 K/UL (ref 150–350)
PMV BLD AUTO: 11.2 FL (ref 9.2–12.9)
POTASSIUM SERPL-SCNC: 4 MMOL/L (ref 3.5–5.1)
PROT SERPL-MCNC: 7.1 G/DL (ref 6–8.4)
PROTHROMBIN TIME: 10 SEC (ref 9–12.5)
RBC # BLD AUTO: 4.59 M/UL (ref 4–5.4)
SODIUM SERPL-SCNC: 142 MMOL/L (ref 136–145)
WBC # BLD AUTO: 4.28 K/UL (ref 3.9–12.7)

## 2020-01-09 PROCEDURE — 93010 ELECTROCARDIOGRAM REPORT: CPT | Mod: S$GLB,,, | Performed by: INTERNAL MEDICINE

## 2020-01-09 PROCEDURE — 85025 COMPLETE CBC W/AUTO DIFF WBC: CPT

## 2020-01-09 PROCEDURE — 36415 COLL VENOUS BLD VENIPUNCTURE: CPT

## 2020-01-09 PROCEDURE — 85730 THROMBOPLASTIN TIME PARTIAL: CPT

## 2020-01-09 PROCEDURE — 80053 COMPREHEN METABOLIC PANEL: CPT

## 2020-01-09 PROCEDURE — 99999 PR PBB SHADOW E&M-EST. PATIENT-LVL IV: ICD-10-PCS | Mod: PBBFAC,,, | Performed by: INTERNAL MEDICINE

## 2020-01-09 PROCEDURE — 99214 OFFICE O/P EST MOD 30 MIN: CPT | Mod: S$GLB,,, | Performed by: INTERNAL MEDICINE

## 2020-01-09 PROCEDURE — 93005 EKG 12-LEAD: ICD-10-PCS | Mod: S$GLB,,, | Performed by: INTERNAL MEDICINE

## 2020-01-09 PROCEDURE — 99214 PR OFFICE/OUTPT VISIT, EST, LEVL IV, 30-39 MIN: ICD-10-PCS | Mod: S$GLB,,, | Performed by: INTERNAL MEDICINE

## 2020-01-09 PROCEDURE — 93005 ELECTROCARDIOGRAM TRACING: CPT | Mod: S$GLB,,, | Performed by: INTERNAL MEDICINE

## 2020-01-09 PROCEDURE — 99999 PR PBB SHADOW E&M-EST. PATIENT-LVL IV: CPT | Mod: PBBFAC,,, | Performed by: INTERNAL MEDICINE

## 2020-01-09 PROCEDURE — 93010 EKG 12-LEAD: ICD-10-PCS | Mod: S$GLB,,, | Performed by: INTERNAL MEDICINE

## 2020-01-09 PROCEDURE — 85610 PROTHROMBIN TIME: CPT

## 2020-01-09 RX ORDER — BUTALBITAL, ACETAMINOPHEN AND CAFFEINE 50; 325; 40 MG/1; MG/1; MG/1
CAPSULE ORAL
Qty: 30 CAPSULE | Refills: 1 | Status: SHIPPED | OUTPATIENT
Start: 2020-01-09 | End: 2020-02-04 | Stop reason: SDUPTHER

## 2020-01-09 NOTE — PROGRESS NOTES
[facility] Note    Chief Complaint      Chief Complaint   Patient presents with    Pre-op Exam       History of Present Illness      Alcira Selby is a 54 y.o. female who presents today for   Chief Complaint   Patient presents with    Pre-op Exam   .  Patient comes to appointment here for preop exam for breast reconstruction with dr mendez . Initially had mastectomy with dr vaughan for dcis of right breast . She is feeling well is active denies chest pain or rodgers . Has good functional status     Problem List Items Addressed This Visit        Neuro    Chronic tension-type headache, not intractable    Overview     needs refill of fiorcet is workign well             Cardiac/Vascular    At risk for coronary artery disease    Overview     See above         Abnormal EKG    Overview     See above            Oncology    Ductal carcinoma in situ (DCIS) of right breast    Overview     Is seeing dr vaughan  surgeon for initial surgery and surveillance  will be seeing dr mendez for breast reconstruction. Also is seeing dr wilkerson gyn             Other    Preop exam for internal medicine - Primary    Overview     Good functional status , no chest pain no rodgers with walking up flight of steps .  ekg shows some t inv infero laterally would rec nuclear stress test to assess prior to surgery  Will make recs when results reviewed                  Past Medical History:  Past Medical History:   Diagnosis Date    Breast cancer     2004       Past Surgical History:  Past Surgical History:   Procedure Laterality Date    AXILLARY NODE DISSECTION Right 7/17/2019    Procedure: LYMPHADENECTOMY, AXILLARY RIGHT;  Surgeon: Darin Vaughan MD;  Location: [address];  Service: General;  Laterality: Right;    COLONOSCOPY N/A 12/12/2019    Procedure: COLONOSCOPY;  Surgeon: Estephanie Pratt MD;  Location: [facility] (4TH FLR);  Service: Endoscopy;  Laterality: N/A;    FOOT SURGERY Bilateral     Plantar  Fascities    HYSTERECTOMY      INCISION AND DRAINAGE Right 8/13/2019    Procedure: Incision and Drainage - right chest wall;  Surgeon: Darin Vaughan MD;  Location: [address];  Service: General;  Laterality: Right;    KNEE SURGERY Left     Total Knee Replacement    MASTECTOMY      MASTECTOMY Right 7/17/2019    Procedure: MASTECTOMY RIGHT;  Surgeon: Darin Vaughan MD;  Location: [address];  Service: General;  Laterality: Right;    MYOMECTOMY      SENTINEL LYMPH NODE BIOPSY Right 7/17/2019    Procedure: BIOPSY, LYMPH NODE, SENTINEL RIGHT;  Surgeon: Darin Vaughan MD;  Location: [address];  Service: General;  Laterality: Right;    TONSILLECTOMY         Family History:  family history includes Cancer in her maternal aunt; Hypertension in her father and mother.    Social History:  Social History     Socioeconomic History    Marital status:      Spouse name: Not on file    Number of children: Not on file    Years of education: Not on file    Highest education level: Not on file   Occupational History    Not on file   Social Needs    Financial resource strain: Not on file    Food insecurity:     Worry: Not on file     Inability: Not on file    Transportation needs:     Medical: Not on file     Non-medical: Not on file   Tobacco Use    Smoking status: Never Smoker    Smokeless tobacco: Never Used   Substance and Sexual Activity    Alcohol use: No     Frequency: Never    Drug use: No    Sexual activity: Not on file   Lifestyle    Physical activity:     Days per week: Not on file     Minutes per session: Not on file    Stress: Not on file   Relationships    Social connections:     Talks on phone: Not on file     Gets together: Not on file     Attends Protestant service: Not on file     Active member of club or organization: Not on file     Attends meetings of clubs or organizations: Not on file     Relationship status: Not on file   Other Topics Concern    Not on  file   Social History Narrative    Not on file       Review of Systems:   ROS      Medications:  Outpatient Encounter Medications as of 1/9/2020   Medication Sig Note Dispense Refill    butalbital-acetaminophen-caff -40 mg -40 mg Cap 1 tab po q4-6hrs prn headache  30 capsule 1    cyanocobalamin (VITAMIN B-12) 100 MCG tablet Take by mouth. 7/16/2019: Hold am of procedure       docusate sodium (COLACE) 100 MG capsule Take 100 mg by mouth 2 (two) times daily. 7/16/2019: Hold  am of procedure       fluticasone propionate (FLONASE) 50 mcg/actuation nasal spray 1 spray (50 mcg total) by Each Nostril route once daily.  1 Bottle 5    ondansetron (ZOFRAN) 4 MG tablet Take 1 tablet (4 mg total) by mouth every 8 (eight) hours as needed for Nausea.  10 tablet 3    oxybutynin (DITROPAN-XL) 10 MG 24 hr tablet TAKE 1 TABLET BY MOUTH ONCE DAILY 7/16/2019: Take as prescribed am of procedure  30 tablet 0    triamcinolone acetonide 0.1% (KENALOG) 0.1 % cream APPLY AA BID PRN   0    vitamin B complex (SUPER B COMPLEX-B-12 ORAL) Take by mouth. 7/16/2019: Hold am of procedure       [DISCONTINUED] butalbital-acetaminophen-caff -40 mg -40 mg Cap 1 tab po q4-6hrs prn headache  30 capsule 1     No facility-administered encounter medications on file as of 1/9/2020.         Allergies:  Review of patient's allergies indicates:   Allergen Reactions    Sulfamethoxazole-trimethoprim Hives and Itching         Physical Exam      Vitals:    01/09/20 0850   BP: (!) 140/82   Pulse: 67   Resp: 18   Temp: 98.6 °F (37 °C)      Body mass index is 35.16 kg/m².    Physical Exam     Laboratory:  CBC:  No results for input(s): WBC, RBC, HGB, HCT, PLT, MCV, MCH, MCHC in the last 2160 hours.  CMP:  No results for input(s): GLU, CALCIUM, ALBUMIN, PROT, NA, K, CO2, CL, BUN, ALKPHOS, ALT, AST, BILITOT in the last 2160 hours.    Invalid input(s): CREATININ  URINALYSIS:  No results for input(s): COLORU, CLARITYU, SPECGRAV, PHUR,  PROTEINUA, GLUCOSEU, BILIRUBINCON, BLOODU, WBCU, RBCU, BACTERIA, MUCUS, NITRITE, LEUKOCYTESUR, UROBILINOGEN, HYALINECASTS in the last 2160 hours.   LIPIDS:  No results for input(s): TSH, HDL, CHOL, TRIG, LDLCALC, CHOLHDL, NONHDLCHOL, TOTALCHOLEST in the last 2160 hours.  TSH:  No results for input(s): TSH in the last 2160 hours.  A1C:  No results for input(s): HGBA1C in the last 2160 hours.    Radiology:        Assessment:     Alcira Selby is a 54 y.o.female with:    Preop exam for internal medicine  -     IN OFFICE EKG 12-LEAD (to Muse)  -     Comprehensive metabolic panel  -     CBC auto differential  -     APTT  -     Protime-INR    Chronic tension-type headache, not intractable  -     butalbital-acetaminophen-caff -40 mg -40 mg Cap; 1 tab po q4-6hrs prn headache  Dispense: 30 capsule; Refill: 1    Ductal carcinoma in situ (DCIS) of right breast    At risk for coronary artery disease  -     NM Myocardial Perfusion Spect Multi Exer; Future; Expected date: 01/09/2020  -     Treadmill Stress Test; Future    Abnormal EKG  -     NM Myocardial Perfusion Spect Multi Exer; Future; Expected date: 01/09/2020  -     Treadmill Stress Test; Future          Plan:     Problem List Items Addressed This Visit        Neuro    Chronic tension-type headache, not intractable    Overview     needs refill of fiorcet is workign well             Cardiac/Vascular    At risk for coronary artery disease    Overview     See above         Abnormal EKG    Overview     See above            Oncology    Ductal carcinoma in situ (DCIS) of right breast    Overview     Is seeing dr lee  surgeon for initial surgery and surveillance  will be seeing dr mendez for breast reconstruction. Also is seeing dr wilkerson gyn             Other    Preop exam for internal medicine - Primary    Overview     Good functional status , no chest pain no rodgers with walking up flight of steps .  ekg shows some t inv infero laterally would rec nuclear  stress test to assess prior to surgery  Will make recs when results reviewed                As above, continue current medications and maintain follow up with specialists.  Return to clinic in 6 months.    Frederick W Dantagnan [facility] [place]

## 2020-01-13 ENCOUNTER — CLINICAL SUPPORT (OUTPATIENT)
Dept: CARDIOLOGY | Facility: CLINIC | Age: 55
End: 2020-01-13
Attending: INTERNAL MEDICINE
Payer: COMMERCIAL

## 2020-01-13 DIAGNOSIS — Z91.89 AT RISK FOR CORONARY ARTERY DISEASE: ICD-10-CM

## 2020-01-13 DIAGNOSIS — Z01.818 PRE-OP TESTING: ICD-10-CM

## 2020-01-13 DIAGNOSIS — R94.31 ABNORMAL EKG: ICD-10-CM

## 2020-01-13 LAB
CV STRESS BASE HR: 71 BPM
DIASTOLIC BLOOD PRESSURE: 100 MMHG
END DIASTOLIC INDEX-HIGH: 170 ML/M2
END SYSTOLIC INDEX-HIGH: 70 ML/M2
NUC REST DIASTOLIC VOLUME INDEX: 89
NUC REST EJECTION FRACTION: 79
NUC REST SYSTOLIC VOLUME INDEX: 18
NUC STRESS DIASTOLIC VOLUME INDEX: 78
NUC STRESS EJECTION FRACTION: 80 %
NUC STRESS SYSTOLIC VOLUME INDEX: 15
OHS CV CPX 1 MINUTE RECOVERY HEART RATE: 137 BPM
OHS CV CPX 85 PERCENT MAX PREDICTED HEART RATE MALE: 134
OHS CV CPX ESTIMATED METS: 10
OHS CV CPX MAX PREDICTED HEART RATE: 158
OHS CV CPX PATIENT IS FEMALE: 1
OHS CV CPX PATIENT IS MALE: 0
OHS CV CPX PEAK DIASTOLIC BLOOD PRESSURE: 97 MMHG
OHS CV CPX PEAK HEAR RATE: 151 BPM
OHS CV CPX PEAK RATE PRESSURE PRODUCT: NORMAL
OHS CV CPX PEAK SYSTOLIC BLOOD PRESSURE: 203 MMHG
OHS CV CPX PERCENT MAX PREDICTED HEART RATE ACHIEVED: 96
OHS CV CPX RATE PRESSURE PRODUCT PRESENTING: NORMAL
RETIRED EF AND QEF - SEE NOTES: 51 %
STRESS ECHO POST EXERCISE DUR MIN: 6 MINUTES
STRESS ECHO POST EXERCISE DUR SEC: 26 SECONDS
STRESS ECHO TARGET HR: 140 BPM
STRESS ST DEPRESSION: 0.5 MM
SYSTOLIC BLOOD PRESSURE: 155 MMHG

## 2020-01-13 PROCEDURE — 93015 STRESS TEST WITH MYOCARDIAL PERFUSION (CUPID ONLY): ICD-10-PCS | Mod: S$GLB,,, | Performed by: INTERNAL MEDICINE

## 2020-01-13 PROCEDURE — A9502 TC99M TETROFOSMIN: HCPCS | Mod: S$GLB,,, | Performed by: INTERNAL MEDICINE

## 2020-01-13 PROCEDURE — A9502 STRESS TEST WITH MYOCARDIAL PERFUSION (CUPID ONLY): ICD-10-PCS | Mod: S$GLB,,, | Performed by: INTERNAL MEDICINE

## 2020-01-13 PROCEDURE — 93015 CV STRESS TEST SUPVJ I&R: CPT | Mod: S$GLB,,, | Performed by: INTERNAL MEDICINE

## 2020-01-13 PROCEDURE — 78452 STRESS TEST WITH MYOCARDIAL PERFUSION (CUPID ONLY): ICD-10-PCS | Mod: S$GLB,,, | Performed by: INTERNAL MEDICINE

## 2020-01-13 PROCEDURE — 78452 HT MUSCLE IMAGE SPECT MULT: CPT | Mod: S$GLB,,, | Performed by: INTERNAL MEDICINE

## 2020-01-22 NOTE — ED TRIAGE NOTES
Alcira Selby, a 54 y.o. female presents to the ED       Pt states she had right mastectomy and two LINDA drain placement on 7/17/19. There is purulent discharge draining from one LINDA drain. No redness to telly-wound area. Incision is well approximated and closed with sutures. She denies any change in drainage from LINDA drains. She is taking percocet at home for the pain. Last BM yesterday. She denies any fevers/chills at home. She is A, A, O x 4.       Review of patient's allergies indicates:   Allergen Reactions    Sulfamethoxazole-trimethoprim Hives and Itching     Past Medical History:   Diagnosis Date    Breast cancer     2004       LOC: The patient is awake, alert, aware of environment with an appropriate affect. Oriented x3, speaking appropriately  APPEARANCE: Pt resting comfortably, in no acute distress, pt is clean and well groomed, clothing properly fastened  SKIN: incision well approximated to left chest with two LINDA drains, no erythema noted. Small amount of purulent drainage from one LINDA drain.   RESPIRATORY: Airway is open and patent, respirations are spontaneous, even and unlabored, normal effort and rate  CARDIAC: Normal rate and rhythm, no peripheral edema noted, capillary refill < 3 seconds, bilateral radial pulses 2+  ABDOMEN: Soft, nontender, nondistended. Bowel sounds present   NEUROLOGIC: PERRL, facial expression is symmetrical, patient moving all extremities spontaneously, normal sensation in all extremities when touched with a finger.  Follows all commands appropriately  MUSCULOSKELETAL: No obvious deformities.       EXAM DESCRIPTION:  CT - Head Brain Wo Cont - 1/22/2020 8:11 am

 

CLINICAL HISTORY:  Alteration of awareness/confusion/brain neoplasm

 

COMPARISON:  None

 

TECHNIQUE:  Computed axial tomography of the head was obtained. IV contrast was not requested.

 

All CT scans are performed using dose optimization technique as appropriate and may include automated
 exposure control or mA/KV adjustment according to patient size.

 

FINDINGS:  A left craniotomy.

 

Large amount of low density throughout the white matter of the left frontal lobe is present. Compress
ion of the lateral ventricle with shift of the midline structures to the right 9 millimeters. Low-den
sity crosses midline via the corpus callosum

 

Intracranial bleed is not noted

 

Fluid within the sinuses/ mastoids is not seen.

 

IMPRESSION:  A large amount of low density throughout the white matter left frontal lobe probably vas
ogenic edema. Subfalcine herniation with shift of the midline structures 9 millimeters to the right. 
Although a mass is not clearly seen on this unenhanced exam it is likely that there is recurrent neop
lasm in the left frontal lobe. Further evaluation with MRI with contrast would be helpful.

 

The exam was discussed with Dr. Diaz In the emergency room 8:15 a.m. January 22, 2020

## 2020-02-04 ENCOUNTER — TELEPHONE (OUTPATIENT)
Dept: FAMILY MEDICINE | Facility: CLINIC | Age: 55
End: 2020-02-04

## 2020-02-04 DIAGNOSIS — G44.229 CHRONIC TENSION-TYPE HEADACHE, NOT INTRACTABLE: ICD-10-CM

## 2020-02-04 RX ORDER — BUTALBITAL, ACETAMINOPHEN AND CAFFEINE 50; 325; 40 MG/1; MG/1; MG/1
CAPSULE ORAL
Qty: 30 CAPSULE | Refills: 1 | Status: SHIPPED | OUTPATIENT
Start: 2020-02-04 | End: 2020-02-21 | Stop reason: SDUPTHER

## 2020-02-04 NOTE — TELEPHONE ENCOUNTER
[facility] Reference# [id number]---Jr.Thomas Sands is out of network with her insurance.

## 2020-02-04 NOTE — TELEPHONE ENCOUNTER
----- Message from Alana HUGHESLonnie Alyssa sent at 2/4/2020 11:33 AM CST -----  Contact: [phone] self   REFILLS:    Patient is requesting a medication refill.    RX name: butalbital-acetaminophen-caff -40 mg     Strength: -40 mg     Directions:  1 tab po q4-6hrs prn headache    Pharmacy name: [facility] #[id number]  [place] - [address] AT [facility]

## 2020-02-04 NOTE — TELEPHONE ENCOUNTER
----- Message from Alyse Tompkins sent at 2/4/2020  2:34 PM CST -----  Pt states need you to call insurance at [phone] for surgery clearance. She is having surgery tomorrow morning..

## 2020-02-07 ENCOUNTER — TELEPHONE (OUTPATIENT)
Dept: UROLOGY | Facility: CLINIC | Age: 55
End: 2020-02-07

## 2020-02-07 RX ORDER — OXYBUTYNIN CHLORIDE 10 MG/1
10 TABLET, EXTENDED RELEASE ORAL DAILY
Qty: 90 TABLET | Refills: 3 | Status: SHIPPED | OUTPATIENT
Start: 2020-02-07 | End: 2020-04-06 | Stop reason: SDUPTHER

## 2020-02-07 NOTE — TELEPHONE ENCOUNTER
----- Message from Mone uEgene sent at 2/7/2020 11:05 AM CST -----  Type:  RX Refill Request    Who Called:self  Refill or New Rx:refill   RX Name and Strength:oxybutynin (DITROPAN-XL) 10 MG 24 hr tablet  How is the patient currently taking it? (ex. 1XDay): TAKE 1 TABLET BY MOUTH ONCE DAILY  Is this a 30 day or 90 day RX:90 days   Preferred Pharmacy with phone number:[facility] #[id number] - [place], [place] - [address] [facility]  Local or Mail Order:mail   Ordering Provider:Dr. Mansfield   Would the patient rather a call back or a response via [facility]? callback  Best Call Back Number:[phone]  Additional Information:N/a

## 2020-02-21 ENCOUNTER — OFFICE VISIT (OUTPATIENT)
Dept: FAMILY MEDICINE | Facility: CLINIC | Age: 55
End: 2020-02-21
Payer: COMMERCIAL

## 2020-02-21 VITALS
OXYGEN SATURATION: 99 % | WEIGHT: 235.13 LBS | TEMPERATURE: 99 F | BODY MASS INDEX: 35.63 KG/M2 | HEIGHT: 68 IN | DIASTOLIC BLOOD PRESSURE: 90 MMHG | HEART RATE: 70 BPM | SYSTOLIC BLOOD PRESSURE: 132 MMHG

## 2020-02-21 DIAGNOSIS — G44.229 CHRONIC TENSION-TYPE HEADACHE, NOT INTRACTABLE: Primary | ICD-10-CM

## 2020-02-21 DIAGNOSIS — D05.11 DUCTAL CARCINOMA IN SITU (DCIS) OF RIGHT BREAST: ICD-10-CM

## 2020-02-21 PROCEDURE — 99213 PR OFFICE/OUTPT VISIT, EST, LEVL III, 20-29 MIN: ICD-10-PCS | Mod: S$GLB,,, | Performed by: INTERNAL MEDICINE

## 2020-02-21 PROCEDURE — 99999 PR PBB SHADOW E&M-EST. PATIENT-LVL IV: ICD-10-PCS | Mod: PBBFAC,,, | Performed by: INTERNAL MEDICINE

## 2020-02-21 PROCEDURE — 99213 OFFICE O/P EST LOW 20 MIN: CPT | Mod: S$GLB,,, | Performed by: INTERNAL MEDICINE

## 2020-02-21 PROCEDURE — 99999 PR PBB SHADOW E&M-EST. PATIENT-LVL IV: CPT | Mod: PBBFAC,,, | Performed by: INTERNAL MEDICINE

## 2020-02-21 RX ORDER — GABAPENTIN 300 MG/1
CAPSULE ORAL
COMMUNITY
Start: 2020-01-24 | End: 2021-02-24 | Stop reason: SDUPTHER

## 2020-02-21 RX ORDER — BUTALBITAL, ACETAMINOPHEN AND CAFFEINE 50; 325; 40 MG/1; MG/1; MG/1
CAPSULE ORAL
Qty: 30 CAPSULE | Refills: 0 | Status: SHIPPED | OUTPATIENT
Start: 2020-02-21 | End: 2020-04-02 | Stop reason: SDUPTHER

## 2020-02-21 RX ORDER — SCOLOPAMINE TRANSDERMAL SYSTEM 1 MG/1
PATCH, EXTENDED RELEASE TRANSDERMAL
COMMUNITY
End: 2022-07-19 | Stop reason: SDUPTHER

## 2020-02-21 RX ORDER — TEMAZEPAM 15 MG/1
CAPSULE ORAL
COMMUNITY
End: 2023-10-30 | Stop reason: SDUPTHER

## 2020-02-21 RX ORDER — PROMETHAZINE HYDROCHLORIDE AND PHENYLEPHRINE HYDROCHLORIDE 6.25; 5 MG/5ML; MG/5ML
SYRUP ORAL
COMMUNITY
End: 2020-08-21

## 2020-02-21 RX ORDER — OXYCODONE AND ACETAMINOPHEN 7.5; 325 MG/1; MG/1
TABLET ORAL
COMMUNITY
Start: 2020-01-24 | End: 2020-08-21

## 2020-02-21 RX ORDER — CODEINE PHOSPHATE AND GUAIFENESIN 10; 100 MG/5ML; MG/5ML
SOLUTION ORAL
COMMUNITY
End: 2021-08-09

## 2020-02-21 RX ORDER — HYDROCODONE BITARTRATE AND ACETAMINOPHEN 10; 325 MG/1; MG/1
TABLET ORAL
COMMUNITY
End: 2020-08-21

## 2020-02-21 RX ORDER — BUTALBITAL AND ACETAMINOPHEN 325; 50 MG/1; MG/1
TABLET ORAL
COMMUNITY
Start: 2020-01-27 | End: 2020-02-21 | Stop reason: SDUPTHER

## 2020-02-21 RX ORDER — PREDNISONE 10 MG/1
TABLET ORAL
COMMUNITY
End: 2020-08-21

## 2020-02-21 RX ORDER — DIAZEPAM 5 MG/1
TABLET ORAL
COMMUNITY
Start: 2020-01-24 | End: 2023-10-30

## 2020-02-21 NOTE — PROGRESS NOTES
[facility] Note    Chief Complaint      Chief Complaint   Patient presents with    Follow-up     6 month follow up       History of Present Illness      Alcira Selby is a 54 y.o. female who presents today for   Chief Complaint   Patient presents with    Follow-up     6 month follow up   .  Patient comes to appointment here for 6 m checkup for chronic issues as below.  Is waiting on surgery for reconstruction after mastectomy for  dcis of right breast . Had issues with insurance coverage waiting on resolution currently . All labs from 1/20 reviewed . She is stable wioht her chronic headaches needs refill on medication.    Problem List Items Addressed This Visit        Neuro    Chronic tension-type headache, not intractable - Primary    Overview     needs refill of fiorcet is workign well             Oncology    Ductal carcinoma in situ (DCIS) of right breast    Overview     Is seeing dr vaughan  surgeon for initial surgery and surveillance  will be seeing dr mendez for breast reconstruction. Also is seeing dr wilkerson.   Awaiting dr mendez and insurance coverage for reconstructive procedure                  Past Medical History:  Past Medical History:   Diagnosis Date    Breast cancer     2004       Past Surgical History:  Past Surgical History:   Procedure Laterality Date    AXILLARY NODE DISSECTION Right 7/17/2019    Procedure: LYMPHADENECTOMY, AXILLARY RIGHT;  Surgeon: Darin Vaughan MD;  Location: [address];  Service: General;  Laterality: Right;    COLONOSCOPY N/A 12/12/2019    Procedure: COLONOSCOPY;  Surgeon: Estephanie Pratt MD;  Location: [facility] (4TH FLR);  Service: Endoscopy;  Laterality: N/A;    FOOT SURGERY Bilateral     Plantar Fascities    HYSTERECTOMY      INCISION AND DRAINAGE Right 8/13/2019    Procedure: Incision and Drainage - right chest wall;  Surgeon: Darin Vaughan MD;  Location: [address];  Service: General;  Laterality: Right;     KNEE SURGERY Left     Total Knee Replacement    MASTECTOMY      MASTECTOMY Right 7/17/2019    Procedure: MASTECTOMY RIGHT;  Surgeon: Darin Vaughan MD;  Location: [address];  Service: General;  Laterality: Right;    MYOMECTOMY      SENTINEL LYMPH NODE BIOPSY Right 7/17/2019    Procedure: BIOPSY, LYMPH NODE, SENTINEL RIGHT;  Surgeon: Darin Vaughan MD;  Location: [address];  Service: General;  Laterality: Right;    TONSILLECTOMY         Family History:  family history includes Cancer in her maternal aunt; Hypertension in her father and mother.    Social History:  Social History     Socioeconomic History    Marital status:      Spouse name: Not on file    Number of children: Not on file    Years of education: Not on file    Highest education level: Not on file   Occupational History    Not on file   Social Needs    Financial resource strain: Not on file    Food insecurity:     Worry: Not on file     Inability: Not on file    Transportation needs:     Medical: Not on file     Non-medical: Not on file   Tobacco Use    Smoking status: Never Smoker    Smokeless tobacco: Never Used   Substance and Sexual Activity    Alcohol use: No     Frequency: Never    Drug use: No    Sexual activity: Not on file   Lifestyle    Physical activity:     Days per week: Not on file     Minutes per session: Not on file    Stress: Not on file   Relationships    Social connections:     Talks on phone: Not on file     Gets together: Not on file     Attends [employer] service: Not on file     Active member of club or organization: Not on file     Attends meetings of clubs or organizations: Not on file     Relationship status: Not on file   Other Topics Concern    Not on file   Social History Narrative    Not on file       Review of Systems:   Review of Systems   Constitutional: Negative for fever and weight loss.   HENT: Negative for congestion, hearing loss and sore throat.    Eyes: Negative  for blurred vision.   Respiratory: Negative for cough and shortness of breath.    Cardiovascular: Negative for chest pain, palpitations, claudication and leg swelling.   Gastrointestinal: Negative for abdominal pain, constipation, diarrhea and heartburn.   Genitourinary: Negative for dysuria.   Musculoskeletal: Negative for back pain and myalgias.   Skin: Negative for rash.   Neurological: Positive for headaches. Negative for focal weakness.   Psychiatric/Behavioral: Negative for depression, memory loss and suicidal ideas. The patient is not nervous/anxious.          Medications:  Outpatient Encounter Medications as of 2/21/2020   Medication Sig Note Dispense Refill    butalbitaL-acetaminophen  mg Tab Take by mouth.       butalbital-acetaminophen-caff -40 mg -40 mg Cap 1 tab po q4-6hrs prn headache  30 capsule 1    cyanocobalamin (VITAMIN B-12) 100 MCG tablet Take by mouth. 7/16/2019: Hold am of procedure       diazePAM (VALIUM) 5 MG tablet        docusate sodium (COLACE) 100 MG capsule Take 100 mg by mouth 2 (two) times daily. 7/16/2019: Hold  am of procedure       fluticasone propionate (FLONASE ALLERGY RELIEF NASL) 50 mcg by Nasal route.       fluticasone propionate (FLONASE) 50 mcg/actuation nasal spray 1 spray (50 mcg total) by Each Nostril route once daily.  1 Bottle 5    gabapentin (NEURONTIN) 300 MG capsule        guaifenesin-codeine 100-10 mg/5 ml (TUSSI-ORGANIDIN NR)  mg/5 mL syrup Virtussin AC 10 mg-100 mg/5 mL oral liquid   TK 10 MLS PO QHS PRF COUGH/CONGESTION.       HYDROcodone-acetaminophen (NORCO)  mg per tablet hydrocodone 10 mg-acetaminophen 325 mg tablet   TK 1 T PO Q 4 TO 6 H PRN       multivit-min/iron/folic/lutein (MULTIVITAMIN WOMEN 50 PLUS ORAL)        ondansetron (ZOFRAN) 4 MG tablet Take 1 tablet (4 mg total) by mouth every 8 (eight) hours as needed for Nausea.  10 tablet 3    oxybutynin (DITROPAN-XL) 10 MG 24 hr tablet Take 1 tablet (10 mg total)  by mouth once daily.  90 tablet 3    oxyCODONE-acetaminophen (PERCOCET) 7.5-325 mg per tablet        predniSONE (DELTASONE) 10 MG tablet prednisone 10 mg tablet   TK UTD       promethazine-phenylephrine (PROMETHAZINE VC) 6.25-5 mg/5 mL syrup promethazine-phenylephrine 6.25 mg-5 mg/5 mL oral syrup   TK 5 ML PO Q 6 H PRF COUGH       scopolamine (TRANSDERM-SCOP) 1.3-1.5 mg (1 mg over 3 days) Transderm-Scop 1.5 mg transdermal patch (1 mg over 3 days)   PREET 1 PA EXT TO THE SKIN Q 3 DAYS PRN       temazepam (RESTORIL) 15 mg Cap temazepam 15 mg capsule   TK 1 C PO QD HS PRN       triamcinolone acetonide 0.1% (KENALOG) 0.1 % cream APPLY AA BID PRN   0    vitamin B complex (SUPER B COMPLEX-B-12 ORAL) Take by mouth. 7/16/2019: Hold am of procedure       vitamin B complex (SUPER B COMPLEX-B-12 ORAL) Take by mouth.        No facility-administered encounter medications on file as of 2/21/2020.         Allergies:  Review of patient's allergies indicates:   Allergen Reactions    Sulfamethoxazole-trimethoprim Hives and Itching         Physical Exam      Vitals:    02/21/20 1107   BP: (!) 132/90   Pulse: 70   Temp: 98.9 °F (37.2 °C)      Body mass index is 35.75 kg/m².    Physical Exam   Constitutional: She is oriented to person, place, and time. She appears well-developed and well-nourished.   HENT:   Mouth/Throat: Oropharynx is clear and moist.   Eyes: Pupils are equal, round, and reactive to light. EOM are normal.   Neck: Normal range of motion. No thyromegaly present.   Cardiovascular: Normal rate and normal heart sounds. Exam reveals no gallop and no friction rub.   No murmur heard.  Pulmonary/Chest: Breath sounds normal.   Abdominal: Soft. Bowel sounds are normal.   Musculoskeletal: Normal range of motion.   Lymphadenopathy:     She has no cervical adenopathy.   Neurological: She is alert and oriented to person, place, and time. No cranial nerve deficit.   Skin: Skin is warm. No rash noted.   Psychiatric: She has a  normal mood and affect. Her behavior is normal.        Laboratory:  CBC:  Recent Labs   Lab Result Units 01/09/20  0944   WBC K/uL 4.28   RBC M/uL 4.59   Hemoglobin g/dL 13.1   Hematocrit % 42.4   Platelets K/uL 272   Mean Corpuscular Volume fL 92   Mean Corpuscular Hemoglobin pg 28.5   Mean Corpuscular Hemoglobin Conc g/dL 30.9*     CMP:  Recent Labs   Lab Result Units 01/09/20  0944   Glucose mg/dL 79   Calcium mg/dL 9.4   Albumin g/dL 4.2   Total Protein g/dL 7.1   Sodium mmol/L 142   Potassium mmol/L 4.0   CO2 mmol/L 28   Chloride mmol/L 106   BUN, Bld mg/dL 13   Alkaline Phosphatase U/L 91   ALT U/L 13   AST U/L 15   Total Bilirubin mg/dL 0.3     URINALYSIS:  No results for input(s): COLORU, CLARITYU, SPECGRAV, PHUR, PROTEINUA, GLUCOSEU, BILIRUBINCON, BLOODU, WBCU, RBCU, BACTERIA, MUCUS, NITRITE, LEUKOCYTESUR, UROBILINOGEN, HYALINECASTS in the last 2160 hours.   LIPIDS:  No results for input(s): TSH, HDL, CHOL, TRIG, LDLCALC, CHOLHDL, NONHDLCHOL, TOTALCHOLEST in the last 2160 hours.  TSH:  No results for input(s): TSH in the last 2160 hours.  A1C:  No results for input(s): HGBA1C in the last 2160 hours.    Radiology:        Assessment:     Alcira Selby is a 54 y.o.female with:    Chronic tension-type headache, not intractable    Ductal carcinoma in situ (DCIS) of right breast          Plan:     Problem List Items Addressed This Visit        Neuro    Chronic tension-type headache, not intractable - Primary    Overview     needs refill of fiorcet is workign well             Oncology    Ductal carcinoma in situ (DCIS) of right breast    Overview     Is seeing dr lee  surgeon for initial surgery and surveillance  will be seeing dr mendez for breast reconstruction. Also is seeing dr wilkerson.   Awaiting dr mendez and insurance coverage for reconstructive procedure                As above, continue current medications and maintain follow up with specialists.  Return to clinic in 6  months.    Dick HO  [facility] [place]

## 2020-03-10 ENCOUNTER — TELEPHONE (OUTPATIENT)
Dept: FAMILY MEDICINE | Facility: CLINIC | Age: 55
End: 2020-03-10

## 2020-03-10 DIAGNOSIS — Z13.6 ENCOUNTER FOR LIPID SCREENING FOR CARDIOVASCULAR DISEASE: Primary | ICD-10-CM

## 2020-03-10 DIAGNOSIS — Z13.220 ENCOUNTER FOR LIPID SCREENING FOR CARDIOVASCULAR DISEASE: Primary | ICD-10-CM

## 2020-03-13 ENCOUNTER — CLINICAL SUPPORT (OUTPATIENT)
Dept: FAMILY MEDICINE | Facility: CLINIC | Age: 55
End: 2020-03-13
Payer: COMMERCIAL

## 2020-03-13 ENCOUNTER — TELEPHONE (OUTPATIENT)
Dept: FAMILY MEDICINE | Facility: CLINIC | Age: 55
End: 2020-03-13

## 2020-03-13 DIAGNOSIS — Z23 NEED FOR TD VACCINE: Primary | ICD-10-CM

## 2020-03-13 DIAGNOSIS — Z23 NEED FOR TETANUS BOOSTER: Primary | ICD-10-CM

## 2020-03-13 PROCEDURE — 90714 TD VACCINE GREATER THAN OR EQUAL TO 7YO PRESERVATIVE FREE IM: ICD-10-PCS | Mod: S$GLB,,, | Performed by: INTERNAL MEDICINE

## 2020-03-13 PROCEDURE — 90714 TD VACC NO PRESV 7 YRS+ IM: CPT | Mod: S$GLB,,, | Performed by: INTERNAL MEDICINE

## 2020-03-13 PROCEDURE — 90471 TD VACCINE GREATER THAN OR EQUAL TO 7YO PRESERVATIVE FREE IM: ICD-10-PCS | Mod: S$GLB,,, | Performed by: INTERNAL MEDICINE

## 2020-03-13 PROCEDURE — 90471 IMMUNIZATION ADMIN: CPT | Mod: S$GLB,,, | Performed by: INTERNAL MEDICINE

## 2020-03-13 NOTE — TELEPHONE ENCOUNTER
The good cholesterol is good hdl 58 , the bad chol is a little high at 150 , rec low chol diet for now will repeat again at next visit

## 2020-03-13 NOTE — TELEPHONE ENCOUNTER
Spoke with patient. Informed patient of lab results and Dr Cutler's recommendations. Pt verbalizes understanding.

## 2020-04-02 DIAGNOSIS — G44.229 CHRONIC TENSION-TYPE HEADACHE, NOT INTRACTABLE: ICD-10-CM

## 2020-04-02 RX ORDER — BUTALBITAL, ACETAMINOPHEN AND CAFFEINE 50; 325; 40 MG/1; MG/1; MG/1
CAPSULE ORAL
Qty: 30 CAPSULE | Refills: 0 | Status: SHIPPED | OUTPATIENT
Start: 2020-04-02 | End: 2020-04-06 | Stop reason: SDUPTHER

## 2020-04-02 NOTE — TELEPHONE ENCOUNTER
----- Message from Mani Osorio sent at 4/2/2020 10:47 AM CDT -----  Contact: [phone]/self   Refill request for butalbital-acetaminophen-caff -40 mg -40 mg Cap  PHARMACY: [facility] #[id number]  [place], [place] - [address] AT [facility] [address]

## 2020-04-06 DIAGNOSIS — N39.41 URGE INCONTINENCE: Primary | ICD-10-CM

## 2020-04-06 DIAGNOSIS — G44.229 CHRONIC TENSION-TYPE HEADACHE, NOT INTRACTABLE: ICD-10-CM

## 2020-04-06 RX ORDER — OXYBUTYNIN CHLORIDE 10 MG/1
10 TABLET, EXTENDED RELEASE ORAL DAILY
Qty: 90 TABLET | Refills: 3 | Status: SHIPPED | OUTPATIENT
Start: 2020-04-06 | End: 2020-04-14 | Stop reason: SDUPTHER

## 2020-04-06 RX ORDER — FLUTICASONE PROPIONATE 50 MCG
1 SPRAY, SUSPENSION (ML) NASAL DAILY
Qty: 9.9 ML | Refills: 5 | Status: SHIPPED | OUTPATIENT
Start: 2020-04-06 | End: 2020-12-29

## 2020-04-06 RX ORDER — BUTALBITAL, ACETAMINOPHEN AND CAFFEINE 50; 325; 40 MG/1; MG/1; MG/1
CAPSULE ORAL
Qty: 30 CAPSULE | Refills: 0 | Status: SHIPPED | OUTPATIENT
Start: 2020-04-06 | End: 2020-04-13 | Stop reason: SDUPTHER

## 2020-04-13 DIAGNOSIS — G44.229 CHRONIC TENSION-TYPE HEADACHE, NOT INTRACTABLE: ICD-10-CM

## 2020-04-13 RX ORDER — BUTALBITAL, ACETAMINOPHEN AND CAFFEINE 50; 325; 40 MG/1; MG/1; MG/1
CAPSULE ORAL
Qty: 30 CAPSULE | Refills: 1 | Status: SHIPPED | OUTPATIENT
Start: 2020-04-13 | End: 2020-05-13

## 2020-04-14 ENCOUNTER — CLINICAL SUPPORT (OUTPATIENT)
Dept: INTERNAL MEDICINE | Facility: CLINIC | Age: 55
End: 2020-04-14
Payer: COMMERCIAL

## 2020-04-14 ENCOUNTER — OFFICE VISIT (OUTPATIENT)
Dept: FAMILY MEDICINE | Facility: CLINIC | Age: 55
End: 2020-04-14
Payer: COMMERCIAL

## 2020-04-14 DIAGNOSIS — R05.9 COUGH: Primary | ICD-10-CM

## 2020-04-14 DIAGNOSIS — Z20.822 CLOSE EXPOSURE TO COVID-19 VIRUS: ICD-10-CM

## 2020-04-14 DIAGNOSIS — R05.9 COUGH: ICD-10-CM

## 2020-04-14 DIAGNOSIS — N39.41 URGE INCONTINENCE: ICD-10-CM

## 2020-04-14 LAB — SARS-COV-2 RNA RESP QL NAA+PROBE: NOT DETECTED

## 2020-04-14 PROCEDURE — U0002 COVID-19 LAB TEST NON-CDC: HCPCS

## 2020-04-14 PROCEDURE — 99441 PR PHYSICIAN TELEPHONE EVALUATION 5-10 MIN: CPT | Mod: 95,,, | Performed by: INTERNAL MEDICINE

## 2020-04-14 PROCEDURE — 99441 PR PHYSICIAN TELEPHONE EVALUATION 5-10 MIN: ICD-10-PCS | Mod: 95,,, | Performed by: INTERNAL MEDICINE

## 2020-04-14 RX ORDER — OXYBUTYNIN CHLORIDE 10 MG/1
10 TABLET, EXTENDED RELEASE ORAL DAILY
Qty: 90 TABLET | Refills: 3 | Status: SHIPPED | OUTPATIENT
Start: 2020-04-14 | End: 2020-08-21

## 2020-04-14 NOTE — PROGRESS NOTES
"The patient location is: home   The chief complaint leading to consultation is: exposure to multiple family members with covid 19 now with cough   Visit type: Virtual visit with synchronous audio   Total time spent with patient: 10 min  Each patient to whom he or she provides medical services by telemedicine is:  (1) informed of the relationship between the physician and patient and the respective role of any other health care provider with respect to management of the patient; and (2) notified that he or she may decline to receive medical services by telemedicine and may withdraw from such care at any time.    Notes: limited pe as this is audio visit only   [facility] Note    Chief Complaint    cc " exposure to multiple family members with covid 19 now Im coughing "    History of Present Illness      Alcira Selby is a 54 y.o. female who presents today for No chief complaint on file.  .  Patient comes to appointment here for acute audio only visit secondary to above . She has a sister in icu currently at [facility] she has had close contact with . Also she has recently lost a brother in law to carona virus on the 1st of this month and has another brother in law that has tested positive . She is only having  cough at present she denies fever , enemies myalgia , no loss of smell and no sob .     Problem List Items Addressed This Visit        Pulmonary    Cough - Primary    Overview     See below             Other    Close Exposure to Covid-19 Virus    Overview     Exposure as discussed in detail in hpi   Needs covid testing   Will schedule today   Discussed self quarantine until results received                  Past Medical History:  Past Medical History:   Diagnosis Date    Breast cancer     2004       Past Surgical History:  Past Surgical History:   Procedure Laterality Date    AXILLARY NODE DISSECTION Right 7/17/2019    Procedure: LYMPHADENECTOMY, AXILLARY RIGHT;  Surgeon: Darin MELENDREZ" MD Alexus;  Location: [facility] OR 2ND FLR;  Service: General;  Laterality: Right;    COLONOSCOPY N/A 12/12/2019    Procedure: COLONOSCOPY;  Surgeon: Estephanie Prtat MD;  Location: [facility] ENDO (4TH FLR);  Service: Endoscopy;  Laterality: N/A;    FOOT SURGERY Bilateral     Plantar Fascities    HYSTERECTOMY      INCISION AND DRAINAGE Right 8/13/2019    Procedure: Incision and Drainage - right chest wall;  Surgeon: Dairn Vaughan MD;  Location: [facility] OR 2ND FLR;  Service: General;  Laterality: Right;    KNEE SURGERY Left     Total Knee Replacement    MASTECTOMY      MASTECTOMY Right 7/17/2019    Procedure: MASTECTOMY RIGHT;  Surgeon: Darin Vaughan MD;  Location: [facility] OR 2ND FLR;  Service: General;  Laterality: Right;    MYOMECTOMY      SENTINEL LYMPH NODE BIOPSY Right 7/17/2019    Procedure: BIOPSY, LYMPH NODE, SENTINEL RIGHT;  Surgeon: Darin Vaughan MD;  Location: [facility] OR 2ND FLR;  Service: General;  Laterality: Right;    TONSILLECTOMY         Family History:  family history includes Cancer in her maternal aunt; Hypertension in her father and mother.    Social History:  Social History     Socioeconomic History    Marital status:      Spouse name: Not on file    Number of children: Not on file    Years of education: Not on file    Highest education level: Not on file   Occupational History    Not on file   Social Needs    Financial resource strain: Not on file    Food insecurity:     Worry: Not on file     Inability: Not on file    Transportation needs:     Medical: Not on file     Non-medical: Not on file   Tobacco Use    Smoking status: Never Smoker    Smokeless tobacco: Never Used   Substance and Sexual Activity    Alcohol use: No     Frequency: Never    Drug use: No    Sexual activity: Not on file   Lifestyle    Physical activity:     Days per week: Not on file     Minutes per session: Not on file    Stress: Not on file   Relationships    Social connections:     Talks  on phone: Not on file     Gets together: Not on file     Attends Confucianist service: Not on file     Active member of club or organization: Not on file     Attends meetings of clubs or organizations: Not on file     Relationship status: Not on file   Other Topics Concern    Not on file   Social History Narrative    Not on file       Review of Systems:   Review of Systems   Constitutional: Positive for malaise/fatigue. Negative for chills and fever.   HENT: Negative for congestion and sinus pain.    Respiratory: Positive for cough. Negative for sputum production and shortness of breath.    Cardiovascular: Negative for chest pain and leg swelling.   Gastrointestinal: Negative for constipation, diarrhea and heartburn.   Musculoskeletal: Negative for myalgias.   Neurological: Negative for headaches.   Psychiatric/Behavioral: The patient is nervous/anxious.          Medications:  Outpatient Encounter Medications as of 4/14/2020   Medication Sig Note Dispense Refill    butalbital-acetaminophen-caff -40 mg -40 mg Cap 1 tab po q4-6hrs prn headache  30 capsule 1    cyanocobalamin (VITAMIN B-12) 100 MCG tablet Take by mouth. 7/16/2019: Hold am of procedure       diazePAM (VALIUM) 5 MG tablet        docusate sodium (COLACE) 100 MG capsule Take 100 mg by mouth 2 (two) times daily. 7/16/2019: Hold  am of procedure       fluticasone propionate (FLONASE ALLERGY RELIEF NASL) 50 mcg by Nasal route.       fluticasone propionate (FLONASE) 50 mcg/actuation nasal spray 1 spray (50 mcg total) by Each Nostril route once daily.  9.9 mL 5    gabapentin (NEURONTIN) 300 MG capsule        guaifenesin-codeine 100-10 mg/5 ml (TUSSI-ORGANIDIN NR)  mg/5 mL syrup Virtussin AC 10 mg-100 mg/5 mL oral liquid   TK 10 MLS PO QHS PRF COUGH/CONGESTION.       HYDROcodone-acetaminophen (NORCO)  mg per tablet hydrocodone 10 mg-acetaminophen 325 mg tablet   TK 1 T PO Q 4 TO 6 H PRN       multivit-min/iron/folic/lutein  (MULTIVITAMIN WOMEN 50 PLUS ORAL)        ondansetron (ZOFRAN) 4 MG tablet Take 1 tablet (4 mg total) by mouth every 8 (eight) hours as needed for Nausea.  10 tablet 3    oxybutynin (DITROPAN-XL) 10 MG 24 hr tablet Take 1 tablet (10 mg total) by mouth once daily.  90 tablet 3    oxyCODONE-acetaminophen (PERCOCET) 7.5-325 mg per tablet        predniSONE (DELTASONE) 10 MG tablet prednisone 10 mg tablet   TK UTD       promethazine-phenylephrine (PROMETHAZINE VC) 6.25-5 mg/5 mL syrup promethazine-phenylephrine 6.25 mg-5 mg/5 mL oral syrup   TK 5 ML PO Q 6 H PRF COUGH       scopolamine (TRANSDERM-SCOP) 1.3-1.5 mg (1 mg over 3 days) Transderm-Scop 1.5 mg transdermal patch (1 mg over 3 days)   PREET 1 PA EXT TO THE SKIN Q 3 DAYS PRN       temazepam (RESTORIL) 15 mg Cap temazepam 15 mg capsule   TK 1 C PO QD HS PRN       triamcinolone acetonide 0.1% (KENALOG) 0.1 % cream APPLY AA BID PRN   0    vitamin B complex (SUPER B COMPLEX-B-12 ORAL) Take by mouth. 7/16/2019: Hold am of procedure       vitamin B complex (SUPER B COMPLEX-B-12 ORAL) Take by mouth.        No facility-administered encounter medications on file as of 4/14/2020.         Allergies:  Review of patient's allergies indicates:   Allergen Reactions    Sulfamethoxazole-trimethoprim Hives and Itching         Physical Exam      There were no vitals filed for this visit.   There is no height or weight on file to calculate BMI.    Physical Exam   Constitutional: She is oriented to person, place, and time.   Neurological: She is alert and oriented to person, place, and time.   Psychiatric: She has a normal mood and affect. Her behavior is normal. Judgment normal.    limited pe as this is audio visit only     Laboratory:  CBC:  No results for input(s): WBC, RBC, HGB, HCT, PLT, MCV, MCH, MCHC in the last 2160 hours.  CMP:  No results for input(s): GLU, CALCIUM, ALBUMIN, PROT, NA, K, CO2, CL, BUN, ALKPHOS, ALT, AST, BILITOT in the last 2160 hours.    Invalid  input(s): CREATININ  URINALYSIS:  No results for input(s): COLORU, CLARITYU, SPECGRAV, PHUR, PROTEINUA, GLUCOSEU, BILIRUBINCON, BLOODU, WBCU, RBCU, BACTERIA, MUCUS, NITRITE, LEUKOCYTESUR, UROBILINOGEN, HYALINECASTS in the last 2160 hours.   LIPIDS:  Recent Labs   Lab Result Units 03/11/20  0708   HDL mg/dL 58   Cholesterol mg/dL 237*   Triglycerides mg/dL 140   LDL Cholesterol mg/dL 151.0   Hdl/Cholesterol Ratio % 24.5   Non-HDL Cholesterol mg/dL 179   Total Cholesterol/HDL Ratio  4.1     TSH:  No results for input(s): TSH in the last 2160 hours.  A1C:  No results for input(s): HGBA1C in the last 2160 hours.    Radiology:        Assessment:     Alcira Selby is a 54 y.o.female with:    Cough  -     COVID-19 Routine Screening; Future; Expected date: 04/14/2020    Close Exposure to Covid-19 Virus  -     COVID-19 Routine Screening; Future; Expected date: 04/14/2020          Plan:     Problem List Items Addressed This Visit        Pulmonary    Cough - Primary    Overview     See below             Other    Close Exposure to Covid-19 Virus    Overview     Exposure as discussed in detail in hpi   Needs covid testing   Will schedule today   Discussed self quarantine until results received                As above, continue current medications and maintain follow up with specialists.  Return to clinic in as scheduled     Frederick W [facility] [place]

## 2020-05-11 DIAGNOSIS — G44.229 CHRONIC TENSION-TYPE HEADACHE, NOT INTRACTABLE: ICD-10-CM

## 2020-05-13 RX ORDER — BUTALBITAL, ACETAMINOPHEN AND CAFFEINE 50; 325; 40 MG/1; MG/1; MG/1
CAPSULE ORAL
Qty: 30 CAPSULE | Refills: 1 | Status: SHIPPED | OUTPATIENT
Start: 2020-05-13 | End: 2020-07-24 | Stop reason: SDUPTHER

## 2020-06-05 ENCOUNTER — TELEPHONE (OUTPATIENT)
Dept: FAMILY MEDICINE | Facility: CLINIC | Age: 55
End: 2020-06-05

## 2020-06-05 NOTE — TELEPHONE ENCOUNTER
----- Message from Natacha Hagan MA sent at 6/5/2020  1:50 PM CDT -----  Contact: [phone]/self      ----- Message -----  From: Mani Osorio  Sent: 6/5/2020  12:49 PM CDT  To: [facility] Staff    Patient states a prior authorization is needed for Rx butalbital-acetaminophen-caff -40 mg -40 mg Cap.     Please advise

## 2020-06-09 ENCOUNTER — TELEPHONE (OUTPATIENT)
Dept: FAMILY MEDICINE | Facility: CLINIC | Age: 55
End: 2020-06-09

## 2020-06-09 NOTE — TELEPHONE ENCOUNTER
----- Message from Sulma Lr sent at 6/9/2020  3:44 PM CDT -----  Contact: self [phone]  Patient is calling to speak with you about a medication request the pharmacy sent over. Please call

## 2020-06-09 NOTE — TELEPHONE ENCOUNTER
Advised patient PA was sent to [facility] waiting on response from [facility] if medication is either approved or denied.

## 2020-06-10 ENCOUNTER — TELEPHONE (OUTPATIENT)
Dept: FAMILY MEDICINE | Facility: CLINIC | Age: 55
End: 2020-06-10

## 2020-06-10 RX ORDER — BUTALBITAL, ACETAMINOPHEN AND CAFFEINE 50; 325; 40 MG/1; MG/1; MG/1
1 TABLET ORAL EVERY 4 HOURS PRN
Qty: 30 TABLET | Refills: 1 | Status: SHIPPED | OUTPATIENT
Start: 2020-06-10 | End: 2020-07-10

## 2020-06-10 NOTE — TELEPHONE ENCOUNTER
----- Message from Yany Kohler sent at 6/10/2020 10:32 AM CDT -----  Contact: Honey abdalla/ [facility] [place] [phone]   Honey is requesting orders for lab work and EKG. [phone]. Please advise.

## 2020-06-10 NOTE — TELEPHONE ENCOUNTER
Per [facility] they will cover the Fiorcet 50/325/40 tablets instead of the caps. Spoke to pt she is okay with changing to tablets. Called Rx into [facility] to change to tablets instead of caps, spoke to Jennifer @[facility]

## 2020-06-10 NOTE — TELEPHONE ENCOUNTER
----- Message from Jania Aguillon sent at 6/10/2020  2:17 PM CDT -----  Contact: Haily Calling from [employer] [phone] Ref [id number]  Calling to talk to nurse in regards to a prior authorization on patients medication butalbital-acetaminophen-caff -40 mg -40 mg Cap 90 capsule [facility] will fax the questions that need to be answered for patients medication.

## 2020-06-10 NOTE — TELEPHONE ENCOUNTER
Spoke to Honey and advised that patient only had lipid panel in March and other labs done in January. Patient had EKG in January. Patient is having surgery and Honey request to fax over results and she will see if surgery will take them.

## 2020-06-25 ENCOUNTER — TELEPHONE (OUTPATIENT)
Dept: FAMILY MEDICINE | Facility: CLINIC | Age: 55
End: 2020-06-25

## 2020-06-25 RX ORDER — CIPROFLOXACIN 500 MG/1
500 TABLET ORAL EVERY 12 HOURS
Qty: 6 TABLET | Refills: 0 | Status: SHIPPED | OUTPATIENT
Start: 2020-06-25 | End: 2020-08-21

## 2020-06-25 NOTE — TELEPHONE ENCOUNTER
----- Message from Toni Deal sent at 6/25/2020 12:51 PM CDT -----  Regarding: UTI medication  Type:  Needs Medical Advice    Who Called:  patient  Symptoms (please be specific):  excessive urination, slight pain  How long has patient had these symptoms:   since last night  Pharmacy [facility] and phone #:   [id number] - [place] - [address] AT [facility] [facility]  Would the patient rather a call back or a response via [facility]?  Call back  Best Call Back Number: [phone]  Additional Information:  wants a medication sent to her pharmacy so she can get some relief

## 2020-07-24 DIAGNOSIS — G44.229 CHRONIC TENSION-TYPE HEADACHE, NOT INTRACTABLE: ICD-10-CM

## 2020-07-24 RX ORDER — BUTALBITAL, ACETAMINOPHEN AND CAFFEINE 50; 325; 40 MG/1; MG/1; MG/1
CAPSULE ORAL
Qty: 30 CAPSULE | Refills: 1 | Status: SHIPPED | OUTPATIENT
Start: 2020-07-24 | End: 2020-08-17 | Stop reason: SDUPTHER

## 2020-07-24 NOTE — TELEPHONE ENCOUNTER
----- Message from Kasia Casillas sent at 7/24/2020 12:50 PM CDT -----  Regarding: refill  Pt needing a refill   #butalbital-acetaminophen-caff -40 mg -40 mg Cap 30 capsule   TAKE 1 CAPSULE BY MOUTH EVERY 4 TO 6 HOURS AS NEEDED FOR HEADACHE  [facility] #[id number] - [place], [place] - [address] AT [facility] [address] [phone] (Phone)  [phone] (Fax)          Please contact pt

## 2020-08-17 DIAGNOSIS — G44.229 CHRONIC TENSION-TYPE HEADACHE, NOT INTRACTABLE: ICD-10-CM

## 2020-08-17 RX ORDER — BUTALBITAL, ACETAMINOPHEN AND CAFFEINE 50; 325; 40 MG/1; MG/1; MG/1
CAPSULE ORAL
Qty: 30 CAPSULE | Refills: 1 | Status: SHIPPED | OUTPATIENT
Start: 2020-08-17 | End: 2020-08-21 | Stop reason: SDUPTHER

## 2020-08-17 NOTE — TELEPHONE ENCOUNTER
----- Message from Kathy Webb sent at 8/17/2020  3:44 PM CDT -----  Patient would like refill of butalbital-acetaminophen-caff -40 mg -40 mg Cap sent to [facility] #[id number] - [place] - [address] AT [address] [address] [facility] [address] [facility]. Please advise.

## 2020-08-21 ENCOUNTER — OFFICE VISIT (OUTPATIENT)
Dept: FAMILY MEDICINE | Facility: CLINIC | Age: 55
End: 2020-08-21
Payer: MEDICARE

## 2020-08-21 VITALS
RESPIRATION RATE: 18 BRPM | SYSTOLIC BLOOD PRESSURE: 140 MMHG | BODY MASS INDEX: 34.59 KG/M2 | HEIGHT: 68 IN | OXYGEN SATURATION: 99 % | HEART RATE: 84 BPM | TEMPERATURE: 98 F | DIASTOLIC BLOOD PRESSURE: 88 MMHG | WEIGHT: 228.19 LBS

## 2020-08-21 DIAGNOSIS — G44.229 CHRONIC TENSION-TYPE HEADACHE, NOT INTRACTABLE: ICD-10-CM

## 2020-08-21 DIAGNOSIS — N32.81 OAB (OVERACTIVE BLADDER): ICD-10-CM

## 2020-08-21 DIAGNOSIS — E78.2 MIXED HYPERLIPIDEMIA: Primary | ICD-10-CM

## 2020-08-21 DIAGNOSIS — D05.11 DUCTAL CARCINOMA IN SITU (DCIS) OF RIGHT BREAST: ICD-10-CM

## 2020-08-21 PROCEDURE — 99214 PR OFFICE/OUTPT VISIT, EST, LEVL IV, 30-39 MIN: ICD-10-PCS | Mod: S$GLB,,, | Performed by: INTERNAL MEDICINE

## 2020-08-21 PROCEDURE — 3008F BODY MASS INDEX DOCD: CPT | Mod: CPTII,S$GLB,, | Performed by: INTERNAL MEDICINE

## 2020-08-21 PROCEDURE — 99999 PR PBB SHADOW E&M-EST. PATIENT-LVL IV: CPT | Mod: PBBFAC,,, | Performed by: INTERNAL MEDICINE

## 2020-08-21 PROCEDURE — 3008F PR BODY MASS INDEX (BMI) DOCUMENTED: ICD-10-PCS | Mod: CPTII,S$GLB,, | Performed by: INTERNAL MEDICINE

## 2020-08-21 PROCEDURE — 99999 PR PBB SHADOW E&M-EST. PATIENT-LVL IV: ICD-10-PCS | Mod: PBBFAC,,, | Performed by: INTERNAL MEDICINE

## 2020-08-21 PROCEDURE — 99214 OFFICE O/P EST MOD 30 MIN: CPT | Mod: S$GLB,,, | Performed by: INTERNAL MEDICINE

## 2020-08-21 RX ORDER — BUTALBITAL, ACETAMINOPHEN AND CAFFEINE 50; 325; 40 MG/1; MG/1; MG/1
CAPSULE ORAL
Qty: 30 CAPSULE | Refills: 1 | Status: SHIPPED | OUTPATIENT
Start: 2020-08-21 | End: 2020-10-06 | Stop reason: SDUPTHER

## 2020-08-21 NOTE — PROGRESS NOTES
[facility]    Chief Complaint      Chief Complaint   Patient presents with    Follow-up     6 month follow up        History of Present Illness      Alcira Selby is a 55 y.o. female who presents today for   Chief Complaint   Patient presents with    Follow-up     6 month follow up    .  Patient comes to appointment here for 6 m checup for chronic issues she is feeling well had her breast reconstructive surery in June . Is feeling well after . Is seeing her gyn for routine surveillance . She is uptodate w ith labs at this time , her headaches are stable and controlled on current regimen     Problem List Items Addressed This Visit        Neuro    Chronic tension-type headache, not intractable    Overview     needs refill of fiorcet is workign well             Cardiac/Vascular    Mixed hyperlipidemia - Primary    Overview     Diet only repeat with next visit             Renal/    OAB (overactive bladder)       Oncology    Ductal carcinoma in situ (DCIS) of right breast    Overview     Is seeing dr vaughan  surgeon for initial surgery and surveillance  will be seeing dr mendez for breast reconstruction. Also is seeing dr wilkerson.   Awaiting dr mendez and insurance coverage for reconstructive procedure . Has had reconstructive surgery in June .is now seeing dr wilkerson for routine surveillance                  Past Medical History:  Past Medical History:   Diagnosis Date    Breast cancer     2004       Past Surgical History:  Past Surgical History:   Procedure Laterality Date    AXILLARY NODE DISSECTION Right 7/17/2019    Procedure: LYMPHADENECTOMY, AXILLARY RIGHT;  Surgeon: Darin Vaughan MD;  Location: [address];  Service: General;  Laterality: Right;    COLONOSCOPY N/A 12/12/2019    Procedure: COLONOSCOPY;  Surgeon: Estephanie Pratt MD;  Location: [facility] (4TH FLR);  Service: Endoscopy;  Laterality: N/A;    FOOT SURGERY Bilateral     Plantar Fascities     HYSTERECTOMY      INCISION AND DRAINAGE Right 8/13/2019    Procedure: Incision and Drainage - right chest wall;  Surgeon: Darin Vaughan MD;  Location: [address];  Service: General;  Laterality: Right;    KNEE SURGERY Left     Total Knee Replacement    MASTECTOMY      MASTECTOMY Right 7/17/2019    Procedure: MASTECTOMY RIGHT;  Surgeon: Darin Vaughan MD;  Location: [address];  Service: General;  Laterality: Right;    MYOMECTOMY      SENTINEL LYMPH NODE BIOPSY Right 7/17/2019    Procedure: BIOPSY, LYMPH NODE, SENTINEL RIGHT;  Surgeon: Darin Vaughan MD;  Location: [address];  Service: General;  Laterality: Right;    TONSILLECTOMY         Family History:  family history includes Cancer in her maternal aunt; Hypertension in her father and mother.    Social History:  Social History     Socioeconomic History    Marital status:      Spouse name: Not on file    Number of children: Not on file    Years of education: Not on file    Highest education level: Not on file   Occupational History    Not on file   Social Needs    Financial resource strain: Not on file    Food insecurity     Worry: Not on file     Inability: Not on file    Transportation needs     Medical: Not on file     Non-medical: Not on file   Tobacco Use    Smoking status: Never Smoker    Smokeless tobacco: Never Used   Substance and Sexual Activity    Alcohol use: No     Frequency: Never    Drug use: No    Sexual activity: Not on file   Lifestyle    Physical activity     Days per week: Not on file     Minutes per session: Not on file    Stress: Not on file   Relationships    Social connections     Talks on phone: Not on file     Gets together: Not on file     Attends Anabaptist service: Not on file     Active member of club or organization: Not on file     Attends meetings of clubs or organizations: Not on file     Relationship status: Not on file   Other Topics Concern    Not on file   Social  History Narrative    Not on file       Review of Systems:   Review of Systems   Constitutional: Negative for fever and weight loss.   HENT: Negative for congestion, hearing loss and sore throat.    Eyes: Negative for blurred vision.   Respiratory: Negative for cough and shortness of breath.    Cardiovascular: Negative for chest pain, palpitations, claudication and leg swelling.   Gastrointestinal: Negative for abdominal pain, constipation, diarrhea, heartburn, nausea and vomiting.   Genitourinary: Negative for dysuria.   Musculoskeletal: Negative for back pain and myalgias.   Skin: Negative for rash.   Neurological: Negative for focal weakness and headaches.   Psychiatric/Behavioral: Negative for depression and suicidal ideas. The patient is not nervous/anxious.          Medications:  Outpatient Encounter Medications as of 8/21/2020   Medication Sig Note Dispense Refill    butalbital-acetaminophen-caff -40 mg -40 mg Cap TAKE 1 CAPSULE BY MOUTH EVERY 4 TO 6 HOURS AS NEEDED FOR HEADACHE  30 capsule 1    cyanocobalamin (VITAMIN B-12) 100 MCG tablet Take by mouth. 7/16/2019: Hold am of procedure       diazePAM (VALIUM) 5 MG tablet        docusate sodium (COLACE) 100 MG capsule Take 100 mg by mouth 2 (two) times daily. 7/16/2019: Hold  am of procedure       fluticasone propionate (FLONASE ALLERGY RELIEF NASL) 50 mcg by Nasal route.       fluticasone propionate (FLONASE) 50 mcg/actuation nasal spray 1 spray (50 mcg total) by Each Nostril route once daily.  9.9 mL 5    gabapentin (NEURONTIN) 300 MG capsule        multivit-min/iron/folic/lutein (MULTIVITAMIN WOMEN 50 PLUS ORAL)        ondansetron (ZOFRAN) 4 MG tablet Take 1 tablet (4 mg total) by mouth every 8 (eight) hours as needed for Nausea.  10 tablet 3    scopolamine (TRANSDERM-SCOP) 1.3-1.5 mg (1 mg over 3 days) Transderm-Scop 1.5 mg transdermal patch (1 mg over 3 days)   PREET 1 PA EXT TO THE SKIN Q 3 DAYS PRN       temazepam (RESTORIL) 15 mg  Cap temazepam 15 mg capsule   TK 1 C PO QD HS PRN       triamcinolone acetonide 0.1% (KENALOG) 0.1 % cream APPLY AA BID PRN   0    vitamin B complex (SUPER B COMPLEX-B-12 ORAL) Take by mouth. 7/16/2019: Hold am of procedure       vitamin B complex (SUPER B COMPLEX-B-12 ORAL) Take by mouth.       [DISCONTINUED] HYDROcodone-acetaminophen (NORCO)  mg per tablet hydrocodone 10 mg-acetaminophen 325 mg tablet   TK 1 T PO Q 4 TO 6 H PRN       [DISCONTINUED] oxybutynin (DITROPAN-XL) 10 MG 24 hr tablet Take 1 tablet (10 mg total) by mouth once daily.  90 tablet 3    [DISCONTINUED] oxyCODONE-acetaminophen (PERCOCET) 7.5-325 mg per tablet        [DISCONTINUED] predniSONE (DELTASONE) 10 MG tablet prednisone 10 mg tablet   TK UTD       guaifenesin-codeine 100-10 mg/5 ml (TUSSI-ORGANIDIN NR)  mg/5 mL syrup Virtussin AC 10 mg-100 mg/5 mL oral liquid   TK 10 MLS PO QHS PRF COUGH/CONGESTION.       [DISCONTINUED] ciprofloxacin HCl (CIPRO) 500 MG tablet Take 1 tablet (500 mg total) by mouth every 12 (twelve) hours. (Patient not taking: Reported on 8/21/2020)  6 tablet 0    [DISCONTINUED] promethazine-phenylephrine (PROMETHAZINE VC) 6.25-5 mg/5 mL syrup promethazine-phenylephrine 6.25 mg-5 mg/5 mL oral syrup   TK 5 ML PO Q 6 H PRF COUGH        No facility-administered encounter medications on file as of 8/21/2020.         Allergies:  Review of patient's allergies indicates:   Allergen Reactions    Sulfamethoxazole-trimethoprim Hives and Itching         Physical Exam      Vitals:    08/21/20 1102   BP: (!) 140/88   Pulse: 84   Resp: 18   Temp: 98.3 °F (36.8 °C)      Body mass index is 34.69 kg/m².    Physical Exam  Constitutional:       Appearance: She is well-developed.   Eyes:      Pupils: Pupils are equal, round, and reactive to light.   Neck:      Musculoskeletal: Normal range of motion.      Thyroid: No thyromegaly.   Cardiovascular:      Rate and Rhythm: Normal rate.      Heart sounds: Normal heart sounds.  No murmur. No friction rub. No gallop.    Pulmonary:      Breath sounds: Normal breath sounds.   Abdominal:      General: Bowel sounds are normal.      Palpations: Abdomen is soft.   Musculoskeletal: Normal range of motion.   Lymphadenopathy:      Cervical: No cervical adenopathy.   Skin:     General: Skin is warm.      Findings: No rash.   Neurological:      Mental Status: She is alert and oriented to person, place, and time.      Cranial Nerves: No cranial nerve deficit.   Psychiatric:         Behavior: Behavior normal.          Laboratory:  CBC:  No results for input(s): WBC, RBC, HGB, HCT, PLT, MCV, MCH, MCHC in the last 2160 hours.  CMP:  No results for input(s): GLU, CALCIUM, ALBUMIN, PROT, NA, K, CO2, CL, BUN, ALKPHOS, ALT, AST, BILITOT in the last 2160 hours.    Invalid input(s): CREATININ  URINALYSIS:  No results for input(s): COLORU, CLARITYU, SPECGRAV, PHUR, PROTEINUA, GLUCOSEU, BILIRUBINCON, BLOODU, WBCU, RBCU, BACTERIA, MUCUS, NITRITE, LEUKOCYTESUR, UROBILINOGEN, HYALINECASTS in the last 2160 hours.   LIPIDS:  No results for input(s): TSH, HDL, CHOL, TRIG, LDLCALC, CHOLHDL, NONHDLCHOL, TOTALCHOLEST in the last 2160 hours.  TSH:  No results for input(s): TSH in the last 2160 hours.  A1C:  No results for input(s): HGBA1C in the last 2160 hours.    Radiology:        Assessment:     Alcira Selby is a 55 y.o.female with:    Mixed hyperlipidemia    Chronic tension-type headache, not intractable  -     butalbital-acetaminophen-caff -40 mg -40 mg Cap; TAKE 1 CAPSULE BY MOUTH EVERY 4 TO 6 HOURS AS NEEDED FOR HEADACHE  Dispense: 30 capsule; Refill: 1    Ductal carcinoma in situ (DCIS) of right breast    OAB (overactive bladder)          Plan:     Problem List Items Addressed This Visit        Neuro    Chronic tension-type headache, not intractable    Overview     needs refill of fiorcet is workign well             Cardiac/Vascular    Mixed hyperlipidemia - Primary    Overview     Diet only  repeat with next visit             Renal/    OAB (overactive bladder)       Oncology    Ductal carcinoma in situ (DCIS) of right breast    Overview     Is seeing dr lee  surgeon for initial surgery and surveillance  will be seeing dr mendez for breast reconstruction. Also is seeing dr wilkerson.   Awaiting dr mendez and insurance coverage for reconstructive procedure . Has had reconstructive surgery in June .is now seeing dr wilkerson for routine surveillance                As above, continue current medications and maintain follow up with specialists.  Return to clinic in 6  months.      [facility] [place]

## 2020-09-30 ENCOUNTER — TELEPHONE (OUTPATIENT)
Dept: FAMILY MEDICINE | Facility: CLINIC | Age: 55
End: 2020-09-30

## 2020-09-30 DIAGNOSIS — Z01.818 PREOP EXAM FOR INTERNAL MEDICINE: Primary | ICD-10-CM

## 2020-09-30 NOTE — TELEPHONE ENCOUNTER
----- Message from Alyssia Rios sent at 9/30/2020  1:37 PM CDT -----  Type:  Needs Medical Advice    Who Called: pt  Advice Regarding: pt will need labs for medical clearance on 10/6. Please let her know if she needs to fast  Would the patient rather a call back or a response via [facility]? call  Best Call Back Number:   Additional Information: n/a

## 2020-10-05 ENCOUNTER — TELEPHONE (OUTPATIENT)
Dept: FAMILY MEDICINE | Facility: CLINIC | Age: 55
End: 2020-10-05

## 2020-10-05 NOTE — TELEPHONE ENCOUNTER
----- Message from Sulma Lr sent at 10/5/2020  4:07 PM CDT -----  Contact: self [phone]  Patient is calling to inquire if she can have her fasting labs in the morning rather than at 1:45 with her appointment. Please call

## 2020-10-05 NOTE — TELEPHONE ENCOUNTER
Patient states she will give us a call back to see if her marie can order the pt and ptt so that it would be coverd

## 2020-10-06 ENCOUNTER — OFFICE VISIT (OUTPATIENT)
Dept: FAMILY MEDICINE | Facility: CLINIC | Age: 55
End: 2020-10-06
Payer: MEDICARE

## 2020-10-06 VITALS
RESPIRATION RATE: 18 BRPM | DIASTOLIC BLOOD PRESSURE: 82 MMHG | HEART RATE: 60 BPM | OXYGEN SATURATION: 98 % | HEIGHT: 68 IN | SYSTOLIC BLOOD PRESSURE: 134 MMHG | TEMPERATURE: 97 F | BODY MASS INDEX: 34.35 KG/M2 | WEIGHT: 226.63 LBS

## 2020-10-06 DIAGNOSIS — Z01.818 PREOP EXAM FOR INTERNAL MEDICINE: Primary | ICD-10-CM

## 2020-10-06 DIAGNOSIS — R94.31 ABNORMAL EKG: ICD-10-CM

## 2020-10-06 DIAGNOSIS — G44.229 CHRONIC TENSION-TYPE HEADACHE, NOT INTRACTABLE: ICD-10-CM

## 2020-10-06 DIAGNOSIS — Z23 NEED FOR INFLUENZA VACCINATION: ICD-10-CM

## 2020-10-06 PROCEDURE — 93005 EKG 12-LEAD: ICD-10-PCS | Mod: S$GLB,,, | Performed by: INTERNAL MEDICINE

## 2020-10-06 PROCEDURE — 93010 EKG 12-LEAD: ICD-10-PCS | Mod: S$GLB,,, | Performed by: INTERNAL MEDICINE

## 2020-10-06 PROCEDURE — 99214 PR OFFICE/OUTPT VISIT, EST, LEVL IV, 30-39 MIN: ICD-10-PCS | Mod: S$GLB,,, | Performed by: INTERNAL MEDICINE

## 2020-10-06 PROCEDURE — 99999 PR PBB SHADOW E&M-EST. PATIENT-LVL V: CPT | Mod: PBBFAC,,, | Performed by: INTERNAL MEDICINE

## 2020-10-06 PROCEDURE — 99999 PR PBB SHADOW E&M-EST. PATIENT-LVL V: ICD-10-PCS | Mod: PBBFAC,,, | Performed by: INTERNAL MEDICINE

## 2020-10-06 PROCEDURE — 93010 ELECTROCARDIOGRAM REPORT: CPT | Mod: S$GLB,,, | Performed by: INTERNAL MEDICINE

## 2020-10-06 PROCEDURE — 93005 ELECTROCARDIOGRAM TRACING: CPT | Mod: S$GLB,,, | Performed by: INTERNAL MEDICINE

## 2020-10-06 PROCEDURE — 99214 OFFICE O/P EST MOD 30 MIN: CPT | Mod: S$GLB,,, | Performed by: INTERNAL MEDICINE

## 2020-10-06 RX ORDER — BUTALBITAL, ACETAMINOPHEN AND CAFFEINE 50; 325; 40 MG/1; MG/1; MG/1
CAPSULE ORAL
Qty: 30 CAPSULE | Refills: 1 | Status: SHIPPED | OUTPATIENT
Start: 2020-10-06 | End: 2020-11-03 | Stop reason: SDUPTHER

## 2020-10-06 NOTE — PROGRESS NOTES
[facility]    Chief Complaint      Chief Complaint   Patient presents with    Pre-op Exam       History of Present Illness      Alcira Selby is a 55 y.o. female who presents today for   Chief Complaint   Patient presents with    Pre-op Exam   .  Patient comes to appointment here for preop eval for breast reduction with dr flaquita mendez . She had good functional status she can go up flight of steps with no cehst pain or sob . pateint has had a nuclear stress test in 1/2020 which was negative for ischemia . She needs cbc , cmp and pt /ptt prior to surgery per surgeons recs . Patient is aware that her insurance will not cover pt /ptt she has asked me to no order this and she will discuss with her surgeon about doing day of surgery     Problem List Items Addressed This Visit        Neuro    Chronic tension-type headache, not intractable    Overview     needs refill of fiorcet is workign well             Cardiac/Vascular    Abnormal EKG    Overview     Repeat ekg looks stable when compared to previous            Other    Preop exam for internal medicine - Primary    Overview     Good functional status , no chest pain no rodgers with walking up flight of steps .  Stress test ( nuclear ) in 1/2020 neg for ischemia   Cleared for procedure with low risk cardiac complications            Other Visit Diagnoses     Need for influenza vaccination                Past Medical History:  Past Medical History:   Diagnosis Date    Breast cancer     2004       Past Surgical History:  Past Surgical History:   Procedure Laterality Date    AXILLARY NODE DISSECTION Right 7/17/2019    Procedure: LYMPHADENECTOMY, AXILLARY RIGHT;  Surgeon: Darin Vaughan MD;  Location: [address];  Service: General;  Laterality: Right;    BREAST SURGERY      COLONOSCOPY N/A 12/12/2019    Procedure: COLONOSCOPY;  Surgeon: Estephanie Pratt MD;  Location: [facility] (4TH FLR);  Service: Endoscopy;  Laterality:  N/A;    FOOT SURGERY Bilateral     Plantar Fascities    HYSTERECTOMY      INCISION AND DRAINAGE Right 8/13/2019    Procedure: Incision and Drainage - right chest wall;  Surgeon: Darin Vaughan MD;  Location: [address];  Service: General;  Laterality: Right;    KNEE SURGERY Left     Total Knee Replacement    MASTECTOMY      MASTECTOMY Right 7/17/2019    Procedure: MASTECTOMY RIGHT;  Surgeon: Darin Vaughan MD;  Location: [address];  Service: General;  Laterality: Right;    MYOMECTOMY      SENTINEL LYMPH NODE BIOPSY Right 7/17/2019    Procedure: BIOPSY, LYMPH NODE, SENTINEL RIGHT;  Surgeon: Darin Vaughan MD;  Location: [address];  Service: General;  Laterality: Right;    TONSILLECTOMY         Family History:  family history includes Cancer in her maternal aunt; Hypertension in her father and mother.    Social History:  Social History     Socioeconomic History    Marital status:      Spouse name: Not on file    Number of children: Not on file    Years of education: Not on file    Highest education level: Not on file   Occupational History    Not on file   Social Needs    Financial resource strain: Very hard    Food insecurity     Worry: Sometimes true     Inability: Sometimes true    Transportation needs     Medical: No     Non-medical: No   Tobacco Use    Smoking status: Never Smoker    Smokeless tobacco: Never Used   Substance and Sexual Activity    Alcohol use: No     Frequency: Never     Drinks per session: Patient refused     Binge frequency: Never    Drug use: No    Sexual activity: Not on file   Lifestyle    Physical activity     Days per week: 7 days     Minutes per session: Not on file    Stress: Not at all   Relationships    Social connections     Talks on phone: More than three times a week     Gets together: More than three times a week     Attends Rastafarian service: Not on file     Active member of club or organization: Yes     Attends meetings  of clubs or organizations: Never     Relationship status:    Other Topics Concern    Not on file   Social History Narrative    Not on file       Review of Systems:   Review of Systems   HENT: Negative for hearing loss.    Eyes: Negative for discharge.   Respiratory: Negative for wheezing.    Cardiovascular: Negative for chest pain and palpitations.   Gastrointestinal: Negative for blood in stool, constipation, diarrhea and vomiting.   Genitourinary: Negative for dysuria and hematuria.   Musculoskeletal: Negative for neck pain.   Neurological: Positive for headaches. Negative for weakness.   Endo/Heme/Allergies: Negative for polydipsia.         Medications:  Outpatient Encounter Medications as of 10/6/2020   Medication Sig Note Dispense Refill    butalbital-acetaminophen-caff -40 mg -40 mg Cap TAKE 1 CAPSULE BY MOUTH EVERY 4 TO 6 HOURS AS NEEDED FOR HEADACHE  30 capsule 1    cyanocobalamin (VITAMIN B-12) 100 MCG tablet Take by mouth. 7/16/2019: Hold am of procedure       diazePAM (VALIUM) 5 MG tablet        docusate sodium (COLACE) 100 MG capsule Take 100 mg by mouth 2 (two) times daily. 7/16/2019: Hold  am of procedure       fluticasone propionate (FLONASE ALLERGY RELIEF NASL) 50 mcg by Nasal route.       fluticasone propionate (FLONASE) 50 mcg/actuation nasal spray 1 spray (50 mcg total) by Each Nostril route once daily.  9.9 mL 5    gabapentin (NEURONTIN) 300 MG capsule        guaifenesin-codeine 100-10 mg/5 ml (TUSSI-ORGANIDIN NR)  mg/5 mL syrup Virtussin AC 10 mg-100 mg/5 mL oral liquid   TK 10 MLS PO QHS PRF COUGH/CONGESTION.       multivit-min/iron/folic/lutein (MULTIVITAMIN WOMEN 50 PLUS ORAL)        ondansetron (ZOFRAN) 4 MG tablet Take 1 tablet (4 mg total) by mouth every 8 (eight) hours as needed for Nausea.  10 tablet 3    scopolamine (TRANSDERM-SCOP) 1.3-1.5 mg (1 mg over 3 days) Transderm-Scop 1.5 mg transdermal patch (1 mg over 3 days)   PREET 1 PA EXT TO THE SKIN Q  3 DAYS PRN       temazepam (RESTORIL) 15 mg Cap temazepam 15 mg capsule   TK 1 C PO QD HS PRN       triamcinolone acetonide 0.1% (KENALOG) 0.1 % cream APPLY AA BID PRN   0    vitamin B complex (SUPER B COMPLEX-B-12 ORAL) Take by mouth. 7/16/2019: Hold am of procedure       vitamin B complex (SUPER B COMPLEX-B-12 ORAL) Take by mouth.       [DISCONTINUED] butalbital-acetaminophen-caff -40 mg -40 mg Cap TAKE 1 CAPSULE BY MOUTH EVERY 4 TO 6 HOURS AS NEEDED FOR HEADACHE  30 capsule 1     No facility-administered encounter medications on file as of 10/6/2020.         Allergies:  Review of patient's allergies indicates:   Allergen Reactions    Sulfamethoxazole-trimethoprim Hives and Itching         Physical Exam      Vitals:    10/06/20 1400   BP: (!) 140/82   Pulse: 60   Resp: 18   Temp: 97.2 °F (36.2 °C)      Body mass index is 34.46 kg/m².    Physical Exam  Constitutional:       Appearance: She is well-developed.   Eyes:      Pupils: Pupils are equal, round, and reactive to light.   Neck:      Musculoskeletal: Normal range of motion.      Thyroid: No thyromegaly.   Cardiovascular:      Rate and Rhythm: Normal rate.      Heart sounds: Normal heart sounds. No murmur. No friction rub. No gallop.    Pulmonary:      Breath sounds: Normal breath sounds.   Abdominal:      General: Bowel sounds are normal.      Palpations: Abdomen is soft.   Musculoskeletal: Normal range of motion.   Lymphadenopathy:      Cervical: No cervical adenopathy.   Skin:     General: Skin is warm.      Findings: No rash.   Neurological:      Mental Status: She is alert and oriented to person, place, and time.      Cranial Nerves: No cranial nerve deficit.   Psychiatric:         Behavior: Behavior normal.          Laboratory:  CBC:  No results for input(s): WBC, RBC, HGB, HCT, PLT, MCV, MCH, MCHC in the last 2160 hours.  CMP:  No results for input(s): GLU, CALCIUM, ALBUMIN, PROT, NA, K, CO2, CL, BUN, ALKPHOS, ALT, AST, BILITOT in the  last 2160 hours.    Invalid input(s): CREATININ  URINALYSIS:  No results for input(s): COLORU, CLARITYU, SPECGRAV, PHUR, PROTEINUA, GLUCOSEU, BILIRUBINCON, BLOODU, WBCU, RBCU, BACTERIA, MUCUS, NITRITE, LEUKOCYTESUR, UROBILINOGEN, HYALINECASTS in the last 2160 hours.   LIPIDS:  No results for input(s): TSH, HDL, CHOL, TRIG, LDLCALC, CHOLHDL, NONHDLCHOL, TOTALCHOLEST in the last 2160 hours.  TSH:  No results for input(s): TSH in the last 2160 hours.  A1C:  No results for input(s): HGBA1C in the last 2160 hours.    Radiology:        Assessment:     Alcira Selby is a 55 y.o.female with:    Preop exam for internal medicine  -     CBC auto differential; Future; Expected date: 10/06/2020  -     Comprehensive Metabolic Panel; Future; Expected date: 10/06/2020    Abnormal EKG  -     IN OFFICE EKG 12-LEAD (to Muse)    Chronic tension-type headache, not intractable  -     butalbital-acetaminophen-caff -40 mg -40 mg Cap; TAKE 1 CAPSULE BY MOUTH EVERY 4 TO 6 HOURS AS NEEDED FOR HEADACHE  Dispense: 30 capsule; Refill: 1    Need for influenza vaccination  -     Influenza - Quadrivalent *Preferred* (6 months+) (PF)          Plan:     Problem List Items Addressed This Visit        Neuro    Chronic tension-type headache, not intractable    Overview     needs refill of fiorcet is workign well             Cardiac/Vascular    Abnormal EKG    Overview     Repeat ekg looks stable when compared to previous            Other    Preop exam for internal medicine - Primary    Overview     Good functional status , no chest pain no rodgers with walking up flight of steps .  Stress test ( nuclear ) in 1/2020 neg for ischemia   Cleared for procedure with low risk cardiac complications            Other Visit Diagnoses     Need for influenza vaccination              As above, continue current medications and maintain follow up with specialists.  Return to clinic in 6 months.      [facility]  [place]                  Answers for HPI/ROS submitted by the patient on 10/5/2020   activity change: No  unexpected weight change: No  rhinorrhea: No  trouble swallowing: No  visual disturbance: No  chest tightness: No  polyuria: No  difficulty urinating: No  menstrual problem: No  joint swelling: No  arthralgias: Yes  confusion: No  dysphoric mood: No

## 2020-10-28 ENCOUNTER — TELEPHONE (OUTPATIENT)
Dept: FAMILY MEDICINE | Facility: CLINIC | Age: 55
End: 2020-10-28

## 2020-10-28 NOTE — TELEPHONE ENCOUNTER
Please find out what she needs, she had clearance done on 10/06 and was cleared. We can fax over office note if needed or does she need something different

## 2020-10-28 NOTE — TELEPHONE ENCOUNTER
----- Message from Natacha Hagan MA sent at 10/27/2020  3:07 PM CDT -----    ----- Message -----  From: Adrianne Gibbons  Sent: 10/27/2020   1:08 PM CDT  To: [facility] Staff    Pt would like a call back regarding getting documentation that everything was good with her EKG so that she can proceed with surgery on 10/28/20    Pt can be reached at [phone]

## 2020-11-02 NOTE — TELEPHONE ENCOUNTER
Patient states although you said she was cleared and we faxed the clearance letter over to them ,they seen that her ekg was abnormal so, they wanted you to send another clearance stating that her ekg is fine and she can go thorough with surgery

## 2020-11-03 ENCOUNTER — TELEPHONE (OUTPATIENT)
Dept: FAMILY MEDICINE | Facility: CLINIC | Age: 55
End: 2020-11-03

## 2020-11-03 DIAGNOSIS — G44.229 CHRONIC TENSION-TYPE HEADACHE, NOT INTRACTABLE: ICD-10-CM

## 2020-11-03 RX ORDER — BUTALBITAL, ACETAMINOPHEN AND CAFFEINE 50; 325; 40 MG/1; MG/1; MG/1
CAPSULE ORAL
Qty: 60 CAPSULE | Refills: 0 | Status: SHIPPED | OUTPATIENT
Start: 2020-11-03 | End: 2020-12-03 | Stop reason: SDUPTHER

## 2020-11-03 NOTE — TELEPHONE ENCOUNTER
Spoke to Danyell pt picked up a Rx on 10/07 and 10/24...  Patient stated she takes the Fioricet twice a day everyday. If this is the way she is suppose to take the medication please write rx to reflect.

## 2020-11-03 NOTE — TELEPHONE ENCOUNTER
----- Message from Toni Deal sent at 11/3/2020  1:11 PM CST -----  Regarding: Refill  Type:  RX Refill Request    Who Called:  patient  Refill or New Rx: refill  RX Name and Strength: butalbital-acetaminophen-caff -40 mg -40 mg Cap  How is the patient currently taking it? (ex. 1XDay): as directed  Is this a 30 day or 90 day RX: 30 day  Preferred Pharmacy with phone number: [id number] - [place] - [address] AT [address] [facility]  Local or Mail Order: local  Ordering Provider: Omayra  Would the patient rather a call back or a response via [facility]?  Call back  Best Call Back Number: [phone]  Additional Information:  please call when sent to the pharmacy

## 2020-12-03 DIAGNOSIS — G44.229 CHRONIC TENSION-TYPE HEADACHE, NOT INTRACTABLE: ICD-10-CM

## 2020-12-03 RX ORDER — BUTALBITAL, ACETAMINOPHEN AND CAFFEINE 50; 325; 40 MG/1; MG/1; MG/1
CAPSULE ORAL
Qty: 60 CAPSULE | Refills: 0 | Status: SHIPPED | OUTPATIENT
Start: 2020-12-03 | End: 2021-01-11 | Stop reason: SDUPTHER

## 2020-12-29 RX ORDER — FLUTICASONE PROPIONATE 50 MCG
SPRAY, SUSPENSION (ML) NASAL
Qty: 32 G | Refills: 3 | Status: SHIPPED | OUTPATIENT
Start: 2020-12-29 | End: 2022-07-08 | Stop reason: SDUPTHER

## 2021-01-11 DIAGNOSIS — G44.229 CHRONIC TENSION-TYPE HEADACHE, NOT INTRACTABLE: ICD-10-CM

## 2021-01-11 RX ORDER — BUTALBITAL, ACETAMINOPHEN AND CAFFEINE 50; 325; 40 MG/1; MG/1; MG/1
CAPSULE ORAL
Qty: 60 CAPSULE | Refills: 0 | Status: SHIPPED | OUTPATIENT
Start: 2021-01-11 | End: 2021-02-10 | Stop reason: SDUPTHER

## 2021-01-19 ENCOUNTER — TELEPHONE (OUTPATIENT)
Dept: FAMILY MEDICINE | Facility: CLINIC | Age: 56
End: 2021-01-19

## 2021-02-10 DIAGNOSIS — G44.229 CHRONIC TENSION-TYPE HEADACHE, NOT INTRACTABLE: ICD-10-CM

## 2021-02-10 RX ORDER — BUTALBITAL, ACETAMINOPHEN AND CAFFEINE 50; 325; 40 MG/1; MG/1; MG/1
CAPSULE ORAL
Qty: 60 CAPSULE | Refills: 0 | Status: SHIPPED | OUTPATIENT
Start: 2021-02-10 | End: 2021-02-24 | Stop reason: SDUPTHER

## 2021-02-24 ENCOUNTER — OFFICE VISIT (OUTPATIENT)
Dept: FAMILY MEDICINE | Facility: CLINIC | Age: 56
End: 2021-02-24
Payer: COMMERCIAL

## 2021-02-24 VITALS
HEART RATE: 69 BPM | BODY MASS INDEX: 34.5 KG/M2 | WEIGHT: 227.63 LBS | TEMPERATURE: 98 F | OXYGEN SATURATION: 97 % | RESPIRATION RATE: 18 BRPM | DIASTOLIC BLOOD PRESSURE: 80 MMHG | HEIGHT: 68 IN | SYSTOLIC BLOOD PRESSURE: 124 MMHG

## 2021-02-24 DIAGNOSIS — M17.12 PRIMARY OSTEOARTHRITIS OF LEFT KNEE: ICD-10-CM

## 2021-02-24 DIAGNOSIS — E78.2 MIXED HYPERLIPIDEMIA: ICD-10-CM

## 2021-02-24 DIAGNOSIS — D05.11 DUCTAL CARCINOMA IN SITU (DCIS) OF RIGHT BREAST: ICD-10-CM

## 2021-02-24 DIAGNOSIS — G44.229 CHRONIC TENSION-TYPE HEADACHE, NOT INTRACTABLE: Primary | ICD-10-CM

## 2021-02-24 PROCEDURE — 3008F PR BODY MASS INDEX (BMI) DOCUMENTED: ICD-10-PCS | Mod: CPTII,S$GLB,, | Performed by: INTERNAL MEDICINE

## 2021-02-24 PROCEDURE — 99999 PR PBB SHADOW E&M-EST. PATIENT-LVL IV: ICD-10-PCS | Mod: PBBFAC,,, | Performed by: INTERNAL MEDICINE

## 2021-02-24 PROCEDURE — 1126F AMNT PAIN NOTED NONE PRSNT: CPT | Mod: S$GLB,,, | Performed by: INTERNAL MEDICINE

## 2021-02-24 PROCEDURE — 99214 OFFICE O/P EST MOD 30 MIN: CPT | Mod: S$GLB,,, | Performed by: INTERNAL MEDICINE

## 2021-02-24 PROCEDURE — 1126F PR PAIN SEVERITY QUANTIFIED, NO PAIN PRESENT: ICD-10-PCS | Mod: S$GLB,,, | Performed by: INTERNAL MEDICINE

## 2021-02-24 PROCEDURE — 99999 PR PBB SHADOW E&M-EST. PATIENT-LVL IV: CPT | Mod: PBBFAC,,, | Performed by: INTERNAL MEDICINE

## 2021-02-24 PROCEDURE — 3008F BODY MASS INDEX DOCD: CPT | Mod: CPTII,S$GLB,, | Performed by: INTERNAL MEDICINE

## 2021-02-24 PROCEDURE — 99214 PR OFFICE/OUTPT VISIT, EST, LEVL IV, 30-39 MIN: ICD-10-PCS | Mod: S$GLB,,, | Performed by: INTERNAL MEDICINE

## 2021-02-24 RX ORDER — BUTALBITAL, ACETAMINOPHEN AND CAFFEINE 50; 325; 40 MG/1; MG/1; MG/1
CAPSULE ORAL
Qty: 60 CAPSULE | Refills: 0 | Status: SHIPPED | OUTPATIENT
Start: 2021-02-24 | End: 2021-04-07 | Stop reason: SDUPTHER

## 2021-02-24 RX ORDER — GABAPENTIN 300 MG/1
300 CAPSULE ORAL NIGHTLY
Qty: 30 CAPSULE | Refills: 5 | Status: SHIPPED | OUTPATIENT
Start: 2021-02-24 | End: 2021-08-09

## 2021-02-24 RX ORDER — DIAZEPAM 5 MG/1
5 TABLET ORAL EVERY 6 HOURS PRN
Status: CANCELLED | OUTPATIENT
Start: 2021-02-24

## 2021-03-01 ENCOUNTER — TELEPHONE (OUTPATIENT)
Dept: FAMILY MEDICINE | Facility: CLINIC | Age: 56
End: 2021-03-01

## 2021-03-01 DIAGNOSIS — R05.9 COUGH: Primary | ICD-10-CM

## 2021-03-01 RX ORDER — BROMPHENIRAMINE MALEATE, PSEUDOEPHEDRINE HYDROCHLORIDE, AND DEXTROMETHORPHAN HYDROBROMIDE 2; 30; 10 MG/5ML; MG/5ML; MG/5ML
5 SYRUP ORAL 4 TIMES DAILY PRN
Qty: 120 ML | Refills: 0 | Status: SHIPPED | OUTPATIENT
Start: 2021-03-01 | End: 2021-11-17

## 2021-03-02 ENCOUNTER — NURSE TRIAGE (OUTPATIENT)
Dept: ADMINISTRATIVE | Facility: CLINIC | Age: 56
End: 2021-03-02

## 2021-03-04 ENCOUNTER — TELEPHONE (OUTPATIENT)
Dept: FAMILY MEDICINE | Facility: CLINIC | Age: 56
End: 2021-03-04

## 2021-03-04 ENCOUNTER — IMMUNIZATION (OUTPATIENT)
Dept: FAMILY MEDICINE | Facility: CLINIC | Age: 56
End: 2021-03-04
Payer: MEDICARE

## 2021-03-04 DIAGNOSIS — J01.10 ACUTE NON-RECURRENT FRONTAL SINUSITIS: Primary | ICD-10-CM

## 2021-03-04 DIAGNOSIS — Z23 NEED FOR VACCINATION: Primary | ICD-10-CM

## 2021-03-04 PROCEDURE — 91300 COVID-19, MRNA, LNP-S, PF, 30 MCG/0.3 ML DOSE VACCINE: CPT | Mod: PBBFAC | Performed by: FAMILY MEDICINE

## 2021-03-04 RX ORDER — AZITHROMYCIN 250 MG/1
TABLET, FILM COATED ORAL
Qty: 6 TABLET | Refills: 0 | Status: SHIPPED | OUTPATIENT
Start: 2021-03-04 | End: 2021-03-09

## 2021-03-25 ENCOUNTER — IMMUNIZATION (OUTPATIENT)
Dept: FAMILY MEDICINE | Facility: CLINIC | Age: 56
End: 2021-03-25
Payer: MEDICARE

## 2021-03-25 DIAGNOSIS — Z23 NEED FOR VACCINATION: Primary | ICD-10-CM

## 2021-03-25 PROCEDURE — 91300 COVID-19, MRNA, LNP-S, PF, 30 MCG/0.3 ML DOSE VACCINE: CPT | Mod: PBBFAC | Performed by: FAMILY MEDICINE

## 2021-03-25 PROCEDURE — 0002A COVID-19, MRNA, LNP-S, PF, 30 MCG/0.3 ML DOSE VACCINE: CPT | Mod: PBBFAC | Performed by: FAMILY MEDICINE

## 2021-04-07 DIAGNOSIS — G44.229 CHRONIC TENSION-TYPE HEADACHE, NOT INTRACTABLE: ICD-10-CM

## 2021-04-08 RX ORDER — BUTALBITAL, ACETAMINOPHEN AND CAFFEINE 50; 325; 40 MG/1; MG/1; MG/1
CAPSULE ORAL
Qty: 60 CAPSULE | Refills: 0 | Status: SHIPPED | OUTPATIENT
Start: 2021-04-08 | End: 2021-04-12

## 2021-04-09 ENCOUNTER — TELEPHONE (OUTPATIENT)
Dept: FAMILY MEDICINE | Facility: CLINIC | Age: 56
End: 2021-04-09

## 2021-04-09 DIAGNOSIS — G44.229 CHRONIC TENSION-TYPE HEADACHE, NOT INTRACTABLE: ICD-10-CM

## 2021-04-12 RX ORDER — BUTALBITAL, ACETAMINOPHEN AND CAFFEINE 50; 325; 40 MG/1; MG/1; MG/1
1 TABLET ORAL EVERY 4 HOURS PRN
Qty: 60 TABLET | Refills: 0 | Status: SHIPPED | OUTPATIENT
Start: 2021-04-12 | End: 2021-04-15 | Stop reason: SDUPTHER

## 2021-04-15 RX ORDER — BUTALBITAL, ACETAMINOPHEN AND CAFFEINE 50; 325; 40 MG/1; MG/1; MG/1
1 TABLET ORAL EVERY 4 HOURS PRN
Qty: 60 TABLET | Refills: 0 | Status: SHIPPED | OUTPATIENT
Start: 2021-04-15 | End: 2021-05-15

## 2021-04-16 ENCOUNTER — TELEPHONE (OUTPATIENT)
Dept: FAMILY MEDICINE | Facility: CLINIC | Age: 56
End: 2021-04-16

## 2021-05-17 DIAGNOSIS — G44.229 CHRONIC TENSION-TYPE HEADACHE, NOT INTRACTABLE: ICD-10-CM

## 2021-05-17 RX ORDER — BUTALBITAL, ACETAMINOPHEN AND CAFFEINE 50; 325; 40 MG/1; MG/1; MG/1
CAPSULE ORAL
Qty: 60 CAPSULE | Refills: 0 | Status: SHIPPED | OUTPATIENT
Start: 2021-05-17 | End: 2021-05-18

## 2021-05-18 RX ORDER — BUTALBITAL, ACETAMINOPHEN AND CAFFEINE 50; 325; 40 MG/1; MG/1; MG/1
1 TABLET ORAL EVERY 4 HOURS PRN
Qty: 60 TABLET | Refills: 0 | Status: SHIPPED | OUTPATIENT
Start: 2021-05-18 | End: 2021-06-17

## 2021-06-03 ENCOUNTER — TELEPHONE (OUTPATIENT)
Dept: FAMILY MEDICINE | Facility: CLINIC | Age: 56
End: 2021-06-03

## 2021-06-04 ENCOUNTER — TELEPHONE (OUTPATIENT)
Dept: FAMILY MEDICINE | Facility: CLINIC | Age: 56
End: 2021-06-04

## 2021-08-02 ENCOUNTER — TELEPHONE (OUTPATIENT)
Dept: FAMILY MEDICINE | Facility: CLINIC | Age: 56
End: 2021-08-02

## 2021-08-02 DIAGNOSIS — J01.10 ACUTE NON-RECURRENT FRONTAL SINUSITIS: Primary | ICD-10-CM

## 2021-08-02 RX ORDER — AZITHROMYCIN 250 MG/1
TABLET, FILM COATED ORAL
Qty: 6 TABLET | Refills: 0 | Status: SHIPPED | OUTPATIENT
Start: 2021-08-02 | End: 2021-08-07

## 2021-08-18 DIAGNOSIS — G44.229 CHRONIC TENSION-TYPE HEADACHE, NOT INTRACTABLE: ICD-10-CM

## 2021-08-18 RX ORDER — BUTALBITAL, ACETAMINOPHEN AND CAFFEINE 50; 325; 40 MG/1; MG/1; MG/1
TABLET ORAL
Qty: 60 TABLET | Refills: 0 | Status: SHIPPED | OUTPATIENT
Start: 2021-08-18 | End: 2021-08-25 | Stop reason: SDUPTHER

## 2021-08-25 ENCOUNTER — OFFICE VISIT (OUTPATIENT)
Dept: FAMILY MEDICINE | Facility: CLINIC | Age: 56
End: 2021-08-25
Payer: COMMERCIAL

## 2021-08-25 VITALS
RESPIRATION RATE: 18 BRPM | HEART RATE: 62 BPM | DIASTOLIC BLOOD PRESSURE: 84 MMHG | SYSTOLIC BLOOD PRESSURE: 124 MMHG | BODY MASS INDEX: 34.83 KG/M2 | WEIGHT: 229.81 LBS | OXYGEN SATURATION: 98 % | HEIGHT: 68 IN | TEMPERATURE: 98 F

## 2021-08-25 DIAGNOSIS — D05.11 DUCTAL CARCINOMA IN SITU (DCIS) OF RIGHT BREAST: ICD-10-CM

## 2021-08-25 DIAGNOSIS — M17.12 PRIMARY OSTEOARTHRITIS OF LEFT KNEE: ICD-10-CM

## 2021-08-25 DIAGNOSIS — E78.2 MIXED HYPERLIPIDEMIA: ICD-10-CM

## 2021-08-25 DIAGNOSIS — G44.229 CHRONIC TENSION-TYPE HEADACHE, NOT INTRACTABLE: ICD-10-CM

## 2021-08-25 PROCEDURE — 99999 PR PBB SHADOW E&M-EST. PATIENT-LVL IV: ICD-10-PCS | Mod: PBBFAC,,, | Performed by: INTERNAL MEDICINE

## 2021-08-25 PROCEDURE — 99214 PR OFFICE/OUTPT VISIT, EST, LEVL IV, 30-39 MIN: ICD-10-PCS | Mod: S$GLB,,, | Performed by: INTERNAL MEDICINE

## 2021-08-25 PROCEDURE — 99999 PR PBB SHADOW E&M-EST. PATIENT-LVL IV: CPT | Mod: PBBFAC,,, | Performed by: INTERNAL MEDICINE

## 2021-08-25 PROCEDURE — 99214 OFFICE O/P EST MOD 30 MIN: CPT | Mod: S$GLB,,, | Performed by: INTERNAL MEDICINE

## 2021-08-25 RX ORDER — BUTALBITAL, ACETAMINOPHEN AND CAFFEINE 50; 325; 40 MG/1; MG/1; MG/1
TABLET ORAL
Qty: 60 TABLET | Refills: 0 | Status: SHIPPED | OUTPATIENT
Start: 2021-08-25 | End: 2021-10-15

## 2021-08-27 NOTE — PROGRESS NOTES
"[facility]       Date: 02/22/2018  Start Time: 1225  Stop Time:  1325  Visit #: 7    Patient Name: Alcira Selby  Clinic Number: [id number]  Age: 52 y.o.  Gender: female    Diagnosis:   Encounter Diagnoses   Name Primary?    Acute pain of left knee     Difficulty walking        Referring Physician: Stacey Friedman MD  Treatment Orders: PT Eval and Treat      History     No past medical history on file.    No current outpatient prescriptions on file.     No current facility-administered medications for this visit.        Review of patient's allergies indicates:  Allergies not on file      Subjective     Pt states taht she is getting a little better each day. Continues to have difficulty lifting her leg on her own but states she has been compliant with her HEP.      Objective      Pt enters Mod I using a rolling walker, antalgic gait pattern with knee flexed, ambulating on toes. Decreased swelling and edema, L knee flexion contracture   PROM L knee flexion 110'       Treatment:   -Stationary bike 10' full revolution Seat level 6 for increased ROM - lower seat   -Standing TKE GTB 2x30  -QS 3x10  --Manual therapy - patella MOBS/PROM/and over pressure ext and HS roller 15'  -Prone knee hang 5 min 5#  -Prone bolster TKE 30x/3"  -GSS w/strap 1'  -HSS w/strap 1'  -B LAQ 20x          -Slantboard x 1 min  - Hamstring stretch at stair x 1min  -Step ups 6" 3x10  - Star taps x 20 bilateral  - Stair lunge on 2nd step x 20  - Heel toe walking in ll bars x 5 min  - Fwd/retro walking in llbars  - Seated knee flexion/extension with manual resistance                 Pt educated on Compliance with TKE exercise along with Knee flexion HEP.      Assessment    Pt demonstrated a good progression ambulating with the straight cane. She has been advised to use her cane while in the home and continue to use her walker while in the community.   This is a 52 y.o. female referred to outpatient " physical therapy and presents with a medical diagnosis of s/p left total kne revision on 2/15/2018. and demonstrates limitations as described in the problem list. Primary impairments include strength, ROM, edema, gait, balance, and pain which limits functional mobility.  Pt demonstrates good rehab potential. Pt will benefit from physcial therapy services in order to maximize pain free and/or functional use of left LE. The following goals were discussed with the patient and patient is in agreement with them as to be addressed in the treatment plan. Pt was given a HEP consisting. Pt verbally understood the instructions as they were given and demonstrated proper form and technique during therapy. Pt was advised to perform these exercises free of pain, and to stop performing them if pain occurs.     Medical necessity is demonstrated by the following problem list:   - Pain limits function of effected part for all activities  - Unable to participate in daily activities   - Requires skilled supervision to complete and progress HEP  - Fall risk - impaired balance   - Continued inability to participate in vocational pursuits    Short Term Goals (4 Weeks):  - Pt will increase ROM to 0-115 degrees PROM  - Pt will increase strength to perform a SLR without extension lag  - Decrease Pain to 0-6/10 ambulating 150 feet with a straight cane  - Decrease edema by 2CM  - Pt independent with HEP with progressions.     Long Term Goals (8 Weeks):  - Pt will increase ROM to 0-125 degrees AROM  - Pt will increase strength to L knee extnsion to > 4/5  - Decrease Pain to 0-3/10 with community ambulation  - Pt to return to work simulated activity  - Pt to improve FOTO score to >50      Plan     Pt will be treated by physical therapy 2-3 times a week for 10 weeks for manual therapy, therapeutic exercise, home exercise program, patient education, and modalities PRN to achieve established goals. Alcira may at times be seen by a PTA as part of  the Rehab Team.  Plan to begin TI in order to manage edema then decrease frequency    Nelson Wall PTA, STS            none

## 2021-12-07 ENCOUNTER — TELEPHONE (OUTPATIENT)
Dept: FAMILY MEDICINE | Facility: CLINIC | Age: 56
End: 2021-12-07
Payer: MEDICARE

## 2021-12-07 DIAGNOSIS — R05.9 COUGH: ICD-10-CM

## 2021-12-08 DIAGNOSIS — G44.229 CHRONIC TENSION-TYPE HEADACHE, NOT INTRACTABLE: ICD-10-CM

## 2021-12-08 RX ORDER — BROMPHENIRAMINE MALEATE, PSEUDOEPHEDRINE HYDROCHLORIDE, AND DEXTROMETHORPHAN HYDROBROMIDE 2; 30; 10 MG/5ML; MG/5ML; MG/5ML
SYRUP ORAL
Qty: 120 ML | Refills: 0 | Status: SHIPPED | OUTPATIENT
Start: 2021-12-08 | End: 2022-01-18

## 2021-12-14 DIAGNOSIS — G44.229 CHRONIC TENSION-TYPE HEADACHE, NOT INTRACTABLE: ICD-10-CM

## 2021-12-16 DIAGNOSIS — G44.229 CHRONIC TENSION-TYPE HEADACHE, NOT INTRACTABLE: ICD-10-CM

## 2021-12-16 RX ORDER — BUTALBITAL, ACETAMINOPHEN AND CAFFEINE 50; 325; 40 MG/1; MG/1; MG/1
TABLET ORAL
Qty: 60 TABLET | Refills: 0 | Status: SHIPPED | OUTPATIENT
Start: 2021-12-16 | End: 2022-01-18

## 2021-12-16 RX ORDER — BUTALBITAL, ACETAMINOPHEN AND CAFFEINE 50; 325; 40 MG/1; MG/1; MG/1
TABLET ORAL
Qty: 60 TABLET | Refills: 0 | OUTPATIENT
Start: 2021-12-16

## 2022-01-17 DIAGNOSIS — R05.9 COUGH: ICD-10-CM

## 2022-01-17 DIAGNOSIS — G44.229 CHRONIC TENSION-TYPE HEADACHE, NOT INTRACTABLE: ICD-10-CM

## 2022-01-17 NOTE — TELEPHONE ENCOUNTER
No new care gaps identified.  Powered by [employer]. Reference number: [id number].   1/17/2022 3:16:48 PM CST

## 2022-01-18 RX ORDER — BROMPHENIRAMINE MALEATE, PSEUDOEPHEDRINE HYDROCHLORIDE, AND DEXTROMETHORPHAN HYDROBROMIDE 2; 30; 10 MG/5ML; MG/5ML; MG/5ML
SYRUP ORAL
Qty: 120 ML | Refills: 0 | Status: SHIPPED | OUTPATIENT
Start: 2022-01-18 | End: 2022-02-18

## 2022-01-18 RX ORDER — BUTALBITAL, ACETAMINOPHEN AND CAFFEINE 50; 325; 40 MG/1; MG/1; MG/1
TABLET ORAL
Qty: 60 TABLET | Refills: 0 | Status: SHIPPED | OUTPATIENT
Start: 2022-01-18 | End: 2022-02-18

## 2022-02-18 DIAGNOSIS — R05.9 COUGH: ICD-10-CM

## 2022-02-18 DIAGNOSIS — G44.229 CHRONIC TENSION-TYPE HEADACHE, NOT INTRACTABLE: ICD-10-CM

## 2022-02-18 RX ORDER — BROMPHENIRAMINE MALEATE, PSEUDOEPHEDRINE HYDROCHLORIDE, AND DEXTROMETHORPHAN HYDROBROMIDE 2; 30; 10 MG/5ML; MG/5ML; MG/5ML
SYRUP ORAL
Qty: 120 ML | Refills: 0 | Status: SHIPPED | OUTPATIENT
Start: 2022-02-18 | End: 2022-07-11

## 2022-02-18 RX ORDER — BUTALBITAL, ACETAMINOPHEN AND CAFFEINE 50; 325; 40 MG/1; MG/1; MG/1
TABLET ORAL
Qty: 60 TABLET | Refills: 0 | Status: SHIPPED | OUTPATIENT
Start: 2022-02-18 | End: 2022-02-25 | Stop reason: SDUPTHER

## 2022-02-18 NOTE — TELEPHONE ENCOUNTER
----- Message from Sergey Pelayo sent at 2/18/2022  9:50 AM CST -----  Requesting an RX refill or new RX.  Is this a refill or new RX: Refill  RX name and strength butalbital-acetaminophen-caffeine -40 mg (FIORICET, ESGIC) -40 mg per tablet and brompheniramine-pseudoeph-DM (BROMFED DM) 2-30-10 mg/5 mL Syrp  Pharmacy name and phone # [id number]  [place], [place] - [address] [facility] AT [facility]  Phone: [phone] Fax:  [phone]

## 2022-02-18 NOTE — TELEPHONE ENCOUNTER
No new care gaps identified.  Powered by [employer]. Reference number: [id number].   2/18/2022 9:47:56 AM CST

## 2022-02-25 ENCOUNTER — OFFICE VISIT (OUTPATIENT)
Dept: INTERNAL MEDICINE | Facility: CLINIC | Age: 57
End: 2022-02-25
Payer: COMMERCIAL

## 2022-02-25 ENCOUNTER — LAB VISIT (OUTPATIENT)
Dept: LAB | Facility: HOSPITAL | Age: 57
End: 2022-02-25
Attending: INTERNAL MEDICINE
Payer: MEDICARE

## 2022-02-25 VITALS
SYSTOLIC BLOOD PRESSURE: 120 MMHG | BODY MASS INDEX: 34.65 KG/M2 | WEIGHT: 228.63 LBS | RESPIRATION RATE: 18 BRPM | HEART RATE: 68 BPM | OXYGEN SATURATION: 98 % | HEIGHT: 68 IN | TEMPERATURE: 99 F | DIASTOLIC BLOOD PRESSURE: 82 MMHG

## 2022-02-25 DIAGNOSIS — E04.9 THYROID GOITER: ICD-10-CM

## 2022-02-25 DIAGNOSIS — E78.2 MIXED HYPERLIPIDEMIA: ICD-10-CM

## 2022-02-25 DIAGNOSIS — M17.12 PRIMARY OSTEOARTHRITIS OF LEFT KNEE: Primary | ICD-10-CM

## 2022-02-25 DIAGNOSIS — G44.229 CHRONIC TENSION-TYPE HEADACHE, NOT INTRACTABLE: ICD-10-CM

## 2022-02-25 LAB
ALBUMIN SERPL BCP-MCNC: 4 G/DL (ref 3.5–5.2)
ALP SERPL-CCNC: 88 U/L (ref 55–135)
ALT SERPL W/O P-5'-P-CCNC: 16 U/L (ref 10–44)
ANION GAP SERPL CALC-SCNC: 11 MMOL/L (ref 8–16)
AST SERPL-CCNC: 15 U/L (ref 10–40)
BILIRUB SERPL-MCNC: 0.3 MG/DL (ref 0.1–1)
BUN SERPL-MCNC: 14 MG/DL (ref 6–20)
CALCIUM SERPL-MCNC: 9.5 MG/DL (ref 8.7–10.5)
CHLORIDE SERPL-SCNC: 102 MMOL/L (ref 95–110)
CHOLEST SERPL-MCNC: 246 MG/DL (ref 120–199)
CHOLEST/HDLC SERPL: 4.1 {RATIO} (ref 2–5)
CO2 SERPL-SCNC: 26 MMOL/L (ref 23–29)
CREAT SERPL-MCNC: 0.8 MG/DL (ref 0.5–1.4)
EST. GFR  (AFRICAN AMERICAN): >60 ML/MIN/1.73 M^2
EST. GFR  (NON AFRICAN AMERICAN): >60 ML/MIN/1.73 M^2
GLUCOSE SERPL-MCNC: 91 MG/DL (ref 70–110)
HDLC SERPL-MCNC: 60 MG/DL (ref 40–75)
HDLC SERPL: 24.4 % (ref 20–50)
LDLC SERPL CALC-MCNC: 162 MG/DL (ref 63–159)
NONHDLC SERPL-MCNC: 186 MG/DL
POTASSIUM SERPL-SCNC: 4 MMOL/L (ref 3.5–5.1)
PROT SERPL-MCNC: 6.8 G/DL (ref 6–8.4)
SODIUM SERPL-SCNC: 139 MMOL/L (ref 136–145)
TRIGL SERPL-MCNC: 120 MG/DL (ref 30–150)
TSH SERPL DL<=0.005 MIU/L-ACNC: 0.61 UIU/ML (ref 0.4–4)

## 2022-02-25 PROCEDURE — 99214 PR OFFICE/OUTPT VISIT, EST, LEVL IV, 30-39 MIN: ICD-10-PCS | Mod: S$GLB,,, | Performed by: INTERNAL MEDICINE

## 2022-02-25 PROCEDURE — 36415 COLL VENOUS BLD VENIPUNCTURE: CPT | Performed by: INTERNAL MEDICINE

## 2022-02-25 PROCEDURE — 80053 COMPREHEN METABOLIC PANEL: CPT | Performed by: INTERNAL MEDICINE

## 2022-02-25 PROCEDURE — 99999 PR PBB SHADOW E&M-EST. PATIENT-LVL IV: ICD-10-PCS | Mod: PBBFAC,,, | Performed by: INTERNAL MEDICINE

## 2022-02-25 PROCEDURE — 84443 ASSAY THYROID STIM HORMONE: CPT | Performed by: INTERNAL MEDICINE

## 2022-02-25 PROCEDURE — 99999 PR PBB SHADOW E&M-EST. PATIENT-LVL IV: CPT | Mod: PBBFAC,,, | Performed by: INTERNAL MEDICINE

## 2022-02-25 PROCEDURE — 80061 LIPID PANEL: CPT | Performed by: INTERNAL MEDICINE

## 2022-02-25 PROCEDURE — 1160F PR REVIEW ALL MEDS BY PRESCRIBER/CLIN PHARMACIST DOCUMENTED: ICD-10-PCS | Mod: CPTII,S$GLB,, | Performed by: INTERNAL MEDICINE

## 2022-02-25 PROCEDURE — 1160F RVW MEDS BY RX/DR IN RCRD: CPT | Mod: CPTII,S$GLB,, | Performed by: INTERNAL MEDICINE

## 2022-02-25 PROCEDURE — 3079F DIAST BP 80-89 MM HG: CPT | Mod: CPTII,S$GLB,, | Performed by: INTERNAL MEDICINE

## 2022-02-25 PROCEDURE — 3079F PR MOST RECENT DIASTOLIC BLOOD PRESSURE 80-89 MM HG: ICD-10-PCS | Mod: CPTII,S$GLB,, | Performed by: INTERNAL MEDICINE

## 2022-02-25 PROCEDURE — 99214 OFFICE O/P EST MOD 30 MIN: CPT | Mod: S$GLB,,, | Performed by: INTERNAL MEDICINE

## 2022-02-25 PROCEDURE — 1159F MED LIST DOCD IN RCRD: CPT | Mod: CPTII,S$GLB,, | Performed by: INTERNAL MEDICINE

## 2022-02-25 PROCEDURE — 3008F PR BODY MASS INDEX (BMI) DOCUMENTED: ICD-10-PCS | Mod: CPTII,S$GLB,, | Performed by: INTERNAL MEDICINE

## 2022-02-25 PROCEDURE — 3074F PR MOST RECENT SYSTOLIC BLOOD PRESSURE < 130 MM HG: ICD-10-PCS | Mod: CPTII,S$GLB,, | Performed by: INTERNAL MEDICINE

## 2022-02-25 PROCEDURE — 3008F BODY MASS INDEX DOCD: CPT | Mod: CPTII,S$GLB,, | Performed by: INTERNAL MEDICINE

## 2022-02-25 PROCEDURE — 3074F SYST BP LT 130 MM HG: CPT | Mod: CPTII,S$GLB,, | Performed by: INTERNAL MEDICINE

## 2022-02-25 PROCEDURE — 1159F PR MEDICATION LIST DOCUMENTED IN MEDICAL RECORD: ICD-10-PCS | Mod: CPTII,S$GLB,, | Performed by: INTERNAL MEDICINE

## 2022-02-25 RX ORDER — BUTALBITAL, ACETAMINOPHEN AND CAFFEINE 50; 325; 40 MG/1; MG/1; MG/1
1 TABLET ORAL EVERY 6 HOURS PRN
Qty: 60 TABLET | Refills: 0 | Status: SHIPPED | OUTPATIENT
Start: 2022-02-25 | End: 2022-04-05 | Stop reason: SDUPTHER

## 2022-02-25 NOTE — PROGRESS NOTES
[facility] Note    Chief Complaint      Chief Complaint   Patient presents with    Follow-up     6M        History of Present Illness      Alcira Selby is a 56 y.o. female who presents today for   Chief Complaint   Patient presents with    Follow-up     6M    .  Patient comes to appointment here for 6m checkup for chronic issues as below . She is under some stress related to  wth dvt . She is complaining of chronic coughing . She is due for labs with hts visit as well     Problem List Items Addressed This Visit        Neuro    Chronic tension-type headache, not intractable    Overview     needs refill of fiorcet is workign well stable               Cardiac/Vascular    Mixed hyperlipidemia    Overview     Diet only repeat cmp and lipid               Endocrine    Thyroid goiter    Overview     Repeat tsh               Orthopedic    Primary osteoarthritis of left knee - Primary    Overview     stabel with current reigmen                    Past Medical History:  Past Medical History:   Diagnosis Date    Breast cancer     2004       Past Surgical History:  Past Surgical History:   Procedure Laterality Date    AXILLARY NODE DISSECTION Right 7/17/2019    Procedure: LYMPHADENECTOMY, AXILLARY RIGHT;  Surgeon: Darin Vaughan MD;  Location: [address];  Service: General;  Laterality: Right;    BREAST SURGERY      COLONOSCOPY N/A 12/12/2019    Procedure: COLONOSCOPY;  Surgeon: Estephanie Pratt MD;  Location: [facility] (4TH FLR);  Service: Endoscopy;  Laterality: N/A;    FOOT SURGERY Bilateral     Plantar Fascities    HYSTERECTOMY      INCISION AND DRAINAGE Right 8/13/2019    Procedure: Incision and Drainage - right chest wall;  Surgeon: Darin Vaughan MD;  Location: [address];  Service: General;  Laterality: Right;    KNEE SURGERY Left     Total Knee Replacement    MASTECTOMY      MASTECTOMY Right 7/17/2019    Procedure: MASTECTOMY RIGHT;  Surgeon:  Darin Vaughan MD;  Location: [address];  Service: General;  Laterality: Right;    MYOMECTOMY      SENTINEL LYMPH NODE BIOPSY Right 7/17/2019    Procedure: BIOPSY, LYMPH NODE, SENTINEL RIGHT;  Surgeon: Darin Vaughan MD;  Location: [address];  Service: General;  Laterality: Right;    TONSILLECTOMY         Family History:  family history includes Cancer in her maternal aunt; Hypertension in her father and mother.    Social History:  Social History     Socioeconomic History    Marital status:    Tobacco Use    Smoking status: Never Smoker    Smokeless tobacco: Never Used   Substance and Sexual Activity    Alcohol use: No    Drug use: No       Review of Systems:   Review of Systems   Constitutional: Negative for fever and weight loss.   HENT: Negative for congestion, hearing loss and sore throat.    Eyes: Negative for blurred vision.   Respiratory: Positive for cough. Negative for shortness of breath.    Cardiovascular: Negative for chest pain, palpitations, claudication and leg swelling.   Gastrointestinal: Negative for abdominal pain, constipation, diarrhea and heartburn.   Genitourinary: Negative for dysuria.   Musculoskeletal: Negative for back pain and myalgias.   Skin: Negative for rash.   Neurological: Positive for headaches. Negative for focal weakness.   Psychiatric/Behavioral: Negative for depression and suicidal ideas. The patient is not nervous/anxious.          Medications:  Outpatient Encounter Medications as of 2/25/2022   Medication Sig Note Dispense Refill    brompheniramine-pseudoeph-DM (BROMFED DM) 2-30-10 mg/5 mL Syrp TAKE 5 ML BY MOUTH FOUR TIMES DAILY AS NEEDED FOR COUGH AND CONGESTION  120 mL 0    butalbital-acetaminophen-caffeine -40 mg (FIORICET, ESGIC) -40 mg per tablet TAKE 1 TABLET BY MOUTH EVERY 6 HOURS AS NEEDED FOR HEADACHE  60 tablet 0    cyanocobalamin (VITAMIN B-12) 100 MCG tablet Take by mouth. 7/16/2019: Hold am of procedure        diazePAM (VALIUM) 5 MG tablet        docusate sodium (COLACE) 100 MG capsule Take 100 mg by mouth 2 (two) times daily. 7/16/2019: Hold  am of procedure       fluticasone propionate (FLONASE ALLERGY RELIEF NASL) 50 mcg by Nasal route.       fluticasone propionate (FLONASE) 50 mcg/actuation nasal spray USE 1 SPRAY IN EACH NOSTRIL EVERY DAY  32 g 3    gabapentin (NEURONTIN) 300 MG capsule TAKE 1 CAPSULE(300 MG) BY MOUTH EVERY EVENING  30 capsule 5    multivit-min/iron/folic/lutein (MULTIVITAMIN WOMEN 50 PLUS ORAL)        ondansetron (ZOFRAN) 4 MG tablet Take 1 tablet (4 mg total) by mouth every 8 (eight) hours as needed for Nausea.  10 tablet 3    scopolamine (TRANSDERM-SCOP) 1.3-1.5 mg (1 mg over 3 days) Transderm-Scop 1.5 mg transdermal patch (1 mg over 3 days)   PREET 1 PA EXT TO THE SKIN Q 3 DAYS PRN       temazepam (RESTORIL) 15 mg Cap temazepam 15 mg capsule   TK 1 C PO QD HS PRN       triamcinolone acetonide 0.1% (KENALOG) 0.1 % cream APPLY AA BID PRN   0    vitamin B complex (SUPER B COMPLEX-B-12 ORAL) Take by mouth. 7/16/2019: Hold am of procedure       vitamin B complex (SUPER B COMPLEX-B-12 ORAL) Take by mouth.        No facility-administered encounter medications on file as of 2/25/2022.        Allergies:  Review of patient's allergies indicates:   Allergen Reactions    Sulfamethoxazole-trimethoprim Hives and Itching         Physical Exam         Vitals:    02/25/22 1023   BP: 120/82   Pulse: 68   Resp: 18   Temp: 98.6 °F (37 °C)         Physical Exam  Constitutional:       Appearance: She is well-developed.   Eyes:      Pupils: Pupils are equal, round, and reactive to light.   Neck:      Thyroid: No thyromegaly.   Cardiovascular:      Rate and Rhythm: Normal rate.      Heart sounds: Normal heart sounds. No murmur heard.    No friction rub. No gallop.   Pulmonary:      Breath sounds: Normal breath sounds.   Abdominal:      General: Bowel sounds are normal.      Palpations: Abdomen is soft.    Musculoskeletal:         General: Normal range of motion.      Cervical back: Normal range of motion.   Lymphadenopathy:      Cervical: No cervical adenopathy.   Skin:     General: Skin is warm.      Findings: No rash.   Neurological:      Mental Status: She is alert and oriented to person, place, and time.      Cranial Nerves: No cranial nerve deficit.   Psychiatric:         Behavior: Behavior normal.          Laboratory:  CBC:  No results for input(s): WBC, RBC, HGB, HCT, PLT, MCV, MCH, MCHC in the last 2160 hours.  CMP:  No results for input(s): GLU, CALCIUM, ALBUMIN, PROT, NA, K, CO2, CL, BUN, ALKPHOS, ALT, AST, BILITOT in the last 2160 hours.    Invalid input(s): CREATININ  URINALYSIS:  No results for input(s): COLORU, CLARITYU, SPECGRAV, PHUR, PROTEINUA, GLUCOSEU, BILIRUBINCON, BLOODU, WBCU, RBCU, BACTERIA, MUCUS, NITRITE, LEUKOCYTESUR, UROBILINOGEN, HYALINECASTS in the last 2160 hours.   LIPIDS:  No results for input(s): TSH, HDL, CHOL, TRIG, LDLCALC, CHOLHDL, NONHDLCHOL, TOTALCHOLEST in the last 2160 hours.  TSH:  No results for input(s): TSH in the last 2160 hours.  A1C:  No results for input(s): HGBA1C in the last 2160 hours.    Radiology:        Assessment:     Alcira Selby is a 56 y.o.female with:    Primary osteoarthritis of left knee    Chronic tension-type headache, not intractable    Mixed hyperlipidemia    Thyroid goiter          Plan:     Problem List Items Addressed This Visit        Neuro    Chronic tension-type headache, not intractable    Overview     needs refill of fiorcet is workign well stable               Cardiac/Vascular    Mixed hyperlipidemia    Overview     Diet only repeat cmp and lipid               Endocrine    Thyroid goiter    Overview     Repeat tsh               Orthopedic    Primary osteoarthritis of left knee - Primary    Overview     stabel with current reigmen                  As above, continue current medications and maintain follow up with specialists.   Return to clinic in 6  months.      [facility] [place]

## 2022-03-10 LAB — BCS RECOMMENDATION EXT: NORMAL

## 2022-04-05 DIAGNOSIS — G44.229 CHRONIC TENSION-TYPE HEADACHE, NOT INTRACTABLE: ICD-10-CM

## 2022-04-05 RX ORDER — BUTALBITAL, ACETAMINOPHEN AND CAFFEINE 50; 325; 40 MG/1; MG/1; MG/1
1 TABLET ORAL EVERY 6 HOURS PRN
Qty: 60 TABLET | Refills: 0 | Status: SHIPPED | OUTPATIENT
Start: 2022-04-05 | End: 2022-05-17 | Stop reason: SDUPTHER

## 2022-04-05 NOTE — TELEPHONE ENCOUNTER
No new care gaps identified.  Powered by [employer]. Reference number: [id number].   4/05/2022 10:32:05 AM CDT

## 2022-04-05 NOTE — TELEPHONE ENCOUNTER
----- Message from Yohana Ferraro sent at 4/5/2022 10:28 AM CDT -----  Contact: Alcira [phone]  Requesting an RX refill or new RX.  Is this a refill or new RX: New  RX name and strength (copy/paste from chart):  butalbital-acetaminophen-caffeine -40 mg (FIORICET, ESGIC) -40 mg per tablet  Is this a 30 day or 90 day RX: 30 day   Pharmacy name and phone # (copy/paste from chart):    [id number] [place] - [address] [place] [address] AT [facility] & [address] [address]  Phone: [phone] Fax: [phone]  The doctors have asked that we provide their patients with the following 2 reminders -- prescription refills can take up to 72 hours, and a friendly reminder that in the future you can use your [facility] account to request refills: Yes

## 2022-05-17 DIAGNOSIS — G44.229 CHRONIC TENSION-TYPE HEADACHE, NOT INTRACTABLE: ICD-10-CM

## 2022-05-17 RX ORDER — BUTALBITAL, ACETAMINOPHEN AND CAFFEINE 50; 325; 40 MG/1; MG/1; MG/1
1 TABLET ORAL EVERY 6 HOURS PRN
Qty: 60 TABLET | Refills: 0 | Status: SHIPPED | OUTPATIENT
Start: 2022-05-17 | End: 2022-06-13 | Stop reason: SDUPTHER

## 2022-05-17 NOTE — TELEPHONE ENCOUNTER
Patient has a runny nose and sore throat-taking Tylenol no other medication.   Stated she went to Urgent Care a while and was negative for COVID.

## 2022-05-17 NOTE — TELEPHONE ENCOUNTER
No new care gaps identified.  [facility] Embedded Care Gaps. Reference number: [id number]. 5/17/2022   4:29:27 PM CDT

## 2022-05-17 NOTE — TELEPHONE ENCOUNTER
----- Message from Wanda Matthew sent at 5/17/2022  3:48 PM CDT -----  Contact: [phone]  1MEDICALADVICE     Patient is calling for Medical Advice regarding:cough , runny nose    How long has patient had these symptoms:x 1 day    Pharmacy name and phone#:  "[facility][id number] - [place] [address] [address]  Phone: [phone] Fax: [phone]            Would like response via [employer]:  call    Comments:            Requesting an RX refill or new RX.  Is this a refill or new RX: refill  RX name and strength (copy/paste from chart):  butalbital-acetaminophen-caffeine -40 mg (FIORICET, ESGIC) -40 mg per tablet  Is this a 30 day or 90 day RX: 90  Pharmacy name and phone # (copy/paste from chart):    "[id number] - [place] [address] [facility] AT [address] [address]  Phone: [phone] Fax: [phone]    The doctors have asked that we provide their patients with the following 2 reminders -- prescription refills can take up to 72 hours, and a friendly reminder that in the future you can use your [facility] account to request refills: yes

## 2022-06-13 DIAGNOSIS — G44.229 CHRONIC TENSION-TYPE HEADACHE, NOT INTRACTABLE: ICD-10-CM

## 2022-06-13 RX ORDER — BUTALBITAL, ACETAMINOPHEN AND CAFFEINE 50; 325; 40 MG/1; MG/1; MG/1
1 TABLET ORAL EVERY 6 HOURS PRN
Qty: 60 TABLET | Refills: 0 | Status: SHIPPED | OUTPATIENT
Start: 2022-06-13 | End: 2022-07-19 | Stop reason: SDUPTHER

## 2022-06-13 NOTE — TELEPHONE ENCOUNTER
No new care gaps identified.  [facility] Embedded Care Gaps. Reference number: [id number]. 6/13/2022   10:33:42 AM LOWELLT

## 2022-07-08 RX ORDER — FLUTICASONE PROPIONATE 50 MCG
1 SPRAY, SUSPENSION (ML) NASAL DAILY
Qty: 32 G | Refills: 3 | Status: SHIPPED | OUTPATIENT
Start: 2022-07-08 | End: 2022-12-16 | Stop reason: SDUPTHER

## 2022-07-08 NOTE — TELEPHONE ENCOUNTER
No new care gaps identified.  [facility] Embedded Care Gaps. Reference number: [id number]. 7/08/2022   1:37:58 PM CDT

## 2022-07-08 NOTE — TELEPHONE ENCOUNTER
----- Message from Bettina Jorge sent at 7/8/2022  1:17 PM CDT -----  Contact: Alcira Frederick [phone]  Requesting an RX refill or new RX.  Is this a refill or new RX: refill   RX name and strength (copy/paste from chart): Flonase    Is this a 30 day or 90 day RX:   Pharmacy name and phone # (copy/paste from chart): [facility] [address]    The doctors have asked that we provide their patients with the following 2 reminders -- prescription refills can take up to 72 hours, and a friendly reminder that in the future you can use your [facility] account to request refills:yes

## 2022-07-11 ENCOUNTER — TELEPHONE (OUTPATIENT)
Dept: INTERNAL MEDICINE | Facility: CLINIC | Age: 57
End: 2022-07-11
Payer: MEDICARE

## 2022-07-11 DIAGNOSIS — R05.9 COUGH: Primary | ICD-10-CM

## 2022-07-11 RX ORDER — BROMPHENIRAMINE MALEATE, PSEUDOEPHEDRINE HYDROCHLORIDE, AND DEXTROMETHORPHAN HYDROBROMIDE 2; 30; 10 MG/5ML; MG/5ML; MG/5ML
5 SYRUP ORAL 4 TIMES DAILY PRN
Qty: 118 ML | Refills: 0 | Status: SHIPPED | OUTPATIENT
Start: 2022-07-11 | End: 2022-07-21

## 2022-07-11 NOTE — TELEPHONE ENCOUNTER
----- Message from Adeola Bee sent at 7/11/2022  2:47 PM CDT -----  Contact: [phone]  Requesting an RX refill or new RX.  Is this a refill or new RX: refill  RX name and strength (copy/paste from chart):  brompheniramine-pseudoeph-DM (BROMFED DM) 2-30-10 mg/5 mL Syrp  Is this a 30 day or 90 day RX: 30  Pharmacy name and phone # (copy/paste from chart):   [facility] #[id number] - [address] AT [facility] & [address] [address]  Phone: [phone] Fax: [phone]        The doctors have asked that we provide their patients with the following 2 reminders -- prescription refills can take up to 72 hours, and a friendly reminder that in the future you can use your [facility] account to request refills: yes

## 2022-07-19 DIAGNOSIS — G44.229 CHRONIC TENSION-TYPE HEADACHE, NOT INTRACTABLE: ICD-10-CM

## 2022-07-19 RX ORDER — SCOLOPAMINE TRANSDERMAL SYSTEM 1 MG/1
1 PATCH, EXTENDED RELEASE TRANSDERMAL
Qty: 4 PATCH | Refills: 0 | Status: SHIPPED | OUTPATIENT
Start: 2022-07-19

## 2022-07-19 RX ORDER — BUTALBITAL, ACETAMINOPHEN AND CAFFEINE 50; 325; 40 MG/1; MG/1; MG/1
1 TABLET ORAL EVERY 6 HOURS PRN
Qty: 60 TABLET | Refills: 0 | Status: SHIPPED | OUTPATIENT
Start: 2022-07-19 | End: 2022-08-19 | Stop reason: SDUPTHER

## 2022-07-19 NOTE — TELEPHONE ENCOUNTER
----- Message from Daysi Naranjo sent at 7/19/2022  2:55 PM CDT -----  Contact: Self/[phone]  Requesting an RX refill or new RX.  Is this a refill or new RX: New  RX name and strength :  butalbital-acetaminophen-caffeine -40 mg (FIORICET, ESGIC) -40 mg per tablet  Is this a 30 day or 90 day RX:   [id number]  [place] [address] [address]  Phone: [phone] Fax: [phone]    [facility]     Requesting an RX refill or new RX.  Is this a refill or new RX: New  RX name and strength :  Dramamine patch   Is this a 30 day or 90 day RX:   [facility][id number]  [place] - [address] [address]  Phone: [phone] Fax: [phone]    The doctors have asked that we provide their patients with the following 2 reminders -- prescription refills can take up to 72 hours, and a friendly reminder that in the future you can use your [facility] account to request refills:               Pt stated that she is going out of town and needs the rx's  refilled asap

## 2022-07-19 NOTE — TELEPHONE ENCOUNTER
No new care gaps identified.  [facility] Embedded Care Gaps. Reference number: [id number]. 7/19/2022   3:26:06 PM CDT

## 2022-08-19 DIAGNOSIS — G44.229 CHRONIC TENSION-TYPE HEADACHE, NOT INTRACTABLE: ICD-10-CM

## 2022-08-19 RX ORDER — BUTALBITAL, ACETAMINOPHEN AND CAFFEINE 50; 325; 40 MG/1; MG/1; MG/1
1 TABLET ORAL EVERY 6 HOURS PRN
Qty: 60 TABLET | Refills: 0 | Status: SHIPPED | OUTPATIENT
Start: 2022-08-19 | End: 2022-10-03 | Stop reason: SDUPTHER

## 2022-08-19 NOTE — TELEPHONE ENCOUNTER
No new care gaps identified.  [facility] Embedded Care Gaps. Reference number: [id number]. 8/19/2022   1:50:13 PM CDT

## 2022-10-03 DIAGNOSIS — G44.229 CHRONIC TENSION-TYPE HEADACHE, NOT INTRACTABLE: ICD-10-CM

## 2022-10-03 RX ORDER — BUTALBITAL, ACETAMINOPHEN AND CAFFEINE 50; 325; 40 MG/1; MG/1; MG/1
1 TABLET ORAL EVERY 6 HOURS PRN
Qty: 60 TABLET | Refills: 0 | Status: SHIPPED | OUTPATIENT
Start: 2022-10-03 | End: 2022-10-27 | Stop reason: SDUPTHER

## 2022-10-03 NOTE — TELEPHONE ENCOUNTER
----- Message from Kaylee Coyne sent at 10/3/2022  9:45 AM CDT -----  Contact: [phone]  Requesting an RX refill or new RX.  Is this a refill or new RX: REFILL  RX name and strength :  butalbital-acetaminophen-caffeine -40 mg (FIORICET, ESGIC) -40 mg per tablet 60 tablet   Is this a 30 day or 90 day RX:   Pharmacy name and phone # :  [id number] - [place] - [address] [facility] AT [facility] & [facility]   Phone:  [phone]  Fax:  [phone]    The doctors have asked that we provide their patients with the following 2 reminders -- prescription refills can take up to 72 hours, and a friendly reminder that in the future you can use your [facility] account to request refills: Patient is out of this medication.

## 2022-10-03 NOTE — TELEPHONE ENCOUNTER
No new care gaps identified.  [facility] Embedded Care Gaps. Reference number: [id number]. 10/03/2022   9:56:01 AM LOWELLT

## 2022-10-27 DIAGNOSIS — G44.229 CHRONIC TENSION-TYPE HEADACHE, NOT INTRACTABLE: ICD-10-CM

## 2022-10-28 RX ORDER — BUTALBITAL, ACETAMINOPHEN AND CAFFEINE 50; 325; 40 MG/1; MG/1; MG/1
1 TABLET ORAL EVERY 6 HOURS PRN
Qty: 60 TABLET | Refills: 0 | Status: SHIPPED | OUTPATIENT
Start: 2022-10-28 | End: 2022-11-22 | Stop reason: SDUPTHER

## 2022-10-28 NOTE — TELEPHONE ENCOUNTER
No new care gaps identified.  [facility] Embedded Care Gaps. Reference number: [id number]. 10/27/2022   10:30:59 PM CDT

## 2022-11-22 DIAGNOSIS — G44.229 CHRONIC TENSION-TYPE HEADACHE, NOT INTRACTABLE: ICD-10-CM

## 2022-11-22 RX ORDER — BUTALBITAL, ACETAMINOPHEN AND CAFFEINE 50; 325; 40 MG/1; MG/1; MG/1
1 TABLET ORAL EVERY 6 HOURS PRN
Qty: 60 TABLET | Refills: 0 | Status: SHIPPED | OUTPATIENT
Start: 2022-11-22 | End: 2022-12-16 | Stop reason: SDUPTHER

## 2022-11-22 NOTE — TELEPHONE ENCOUNTER
No new care gaps identified.  [facility] Embedded Care Gaps. Reference number: [id number]. 11/22/2022   10:35:14 AM CST

## 2022-12-16 DIAGNOSIS — G44.229 CHRONIC TENSION-TYPE HEADACHE, NOT INTRACTABLE: ICD-10-CM

## 2022-12-16 RX ORDER — FLUTICASONE PROPIONATE 50 MCG
1 SPRAY, SUSPENSION (ML) NASAL DAILY
Qty: 32 G | Refills: 3 | Status: SHIPPED | OUTPATIENT
Start: 2022-12-16 | End: 2023-10-26

## 2022-12-16 RX ORDER — BUTALBITAL, ACETAMINOPHEN AND CAFFEINE 50; 325; 40 MG/1; MG/1; MG/1
1 TABLET ORAL EVERY 6 HOURS PRN
Qty: 60 TABLET | Refills: 0 | Status: SHIPPED | OUTPATIENT
Start: 2022-12-16 | End: 2023-01-22 | Stop reason: SDUPTHER

## 2022-12-16 RX ORDER — BUTALBITAL, ACETAMINOPHEN AND CAFFEINE 50; 325; 40 MG/1; MG/1; MG/1
1 TABLET ORAL EVERY 6 HOURS PRN
Qty: 60 TABLET | Refills: 0 | Status: SHIPPED | OUTPATIENT
Start: 2022-12-16 | End: 2022-12-16 | Stop reason: SDUPTHER

## 2022-12-16 NOTE — TELEPHONE ENCOUNTER
No new care gaps identified.  [facility] Embedded Care Gaps. Reference number: [id number]. 12/16/2022   10:06:05 AM CST

## 2022-12-28 ENCOUNTER — TELEPHONE (OUTPATIENT)
Dept: INTERNAL MEDICINE | Facility: CLINIC | Age: 57
End: 2022-12-28
Payer: MEDICARE

## 2022-12-28 DIAGNOSIS — R05.9 COUGH, UNSPECIFIED TYPE: Primary | ICD-10-CM

## 2022-12-28 NOTE — TELEPHONE ENCOUNTER
brompheniramine-pseudoeph-DM (BROMFED DM) 2-30-10 mg/5 mL Syrp      Patient is coughing x 2 days asking for refill, last had this 07/11/2022

## 2022-12-28 NOTE — TELEPHONE ENCOUNTER
----- Message from Adeola Bee sent at 12/28/2022 11:42 AM CST -----  Contact: [phone]  Pt is calling she is needing her cough syrup filled and I do not see it in her chart please give her  a return call

## 2022-12-29 RX ORDER — BROMPHENIRAMINE MALEATE, PSEUDOEPHEDRINE HYDROCHLORIDE, AND DEXTROMETHORPHAN HYDROBROMIDE 2; 30; 10 MG/5ML; MG/5ML; MG/5ML
5 SYRUP ORAL 4 TIMES DAILY PRN
Qty: 118 ML | Refills: 0 | Status: SHIPPED | OUTPATIENT
Start: 2022-12-29 | End: 2023-01-08

## 2023-01-22 DIAGNOSIS — G44.229 CHRONIC TENSION-TYPE HEADACHE, NOT INTRACTABLE: ICD-10-CM

## 2023-01-22 NOTE — TELEPHONE ENCOUNTER
No new care gaps identified.  [facility] Embedded Care Gaps. Reference number: [id number]. 1/22/2023   5:48:26 PM CST

## 2023-01-23 RX ORDER — BUTALBITAL, ACETAMINOPHEN AND CAFFEINE 50; 325; 40 MG/1; MG/1; MG/1
1 TABLET ORAL EVERY 6 HOURS PRN
Qty: 60 TABLET | Refills: 0 | Status: SHIPPED | OUTPATIENT
Start: 2023-01-23 | End: 2023-02-22 | Stop reason: SDUPTHER

## 2023-02-22 DIAGNOSIS — G44.229 CHRONIC TENSION-TYPE HEADACHE, NOT INTRACTABLE: ICD-10-CM

## 2023-02-22 RX ORDER — BUTALBITAL, ACETAMINOPHEN AND CAFFEINE 50; 325; 40 MG/1; MG/1; MG/1
1 TABLET ORAL EVERY 6 HOURS PRN
Qty: 60 TABLET | Refills: 0 | Status: SHIPPED | OUTPATIENT
Start: 2023-02-22 | End: 2023-04-24 | Stop reason: SDUPTHER

## 2023-02-22 NOTE — TELEPHONE ENCOUNTER
No new care gaps identified.  [facility] Embedded Care Gaps. Reference number: [id number]. 2/22/2023   11:12:26 AM CST

## 2023-03-11 LAB — BCS RECOMMENDATION EXT: NORMAL

## 2023-04-24 ENCOUNTER — OFFICE VISIT (OUTPATIENT)
Dept: PRIMARY CARE CLINIC | Facility: CLINIC | Age: 58
End: 2023-04-24
Payer: MEDICARE

## 2023-04-24 VITALS
TEMPERATURE: 99 F | HEIGHT: 68 IN | OXYGEN SATURATION: 96 % | SYSTOLIC BLOOD PRESSURE: 122 MMHG | DIASTOLIC BLOOD PRESSURE: 80 MMHG | RESPIRATION RATE: 18 BRPM | WEIGHT: 236.75 LBS | BODY MASS INDEX: 35.88 KG/M2 | HEART RATE: 60 BPM

## 2023-04-24 DIAGNOSIS — D05.11 DUCTAL CARCINOMA IN SITU (DCIS) OF RIGHT BREAST: ICD-10-CM

## 2023-04-24 DIAGNOSIS — E04.9 THYROID GOITER: ICD-10-CM

## 2023-04-24 DIAGNOSIS — N32.81 OAB (OVERACTIVE BLADDER): ICD-10-CM

## 2023-04-24 DIAGNOSIS — E78.2 MIXED HYPERLIPIDEMIA: Primary | ICD-10-CM

## 2023-04-24 DIAGNOSIS — M17.12 PRIMARY OSTEOARTHRITIS OF LEFT KNEE: ICD-10-CM

## 2023-04-24 DIAGNOSIS — E66.01 SEVERE OBESITY (BMI 35.0-39.9) WITH COMORBIDITY: ICD-10-CM

## 2023-04-24 DIAGNOSIS — G44.229 CHRONIC TENSION-TYPE HEADACHE, NOT INTRACTABLE: ICD-10-CM

## 2023-04-24 PROCEDURE — 3074F SYST BP LT 130 MM HG: CPT | Mod: CPTII,S$GLB,, | Performed by: INTERNAL MEDICINE

## 2023-04-24 PROCEDURE — 99214 PR OFFICE/OUTPT VISIT, EST, LEVL IV, 30-39 MIN: ICD-10-PCS | Mod: S$GLB,,, | Performed by: INTERNAL MEDICINE

## 2023-04-24 PROCEDURE — 1159F PR MEDICATION LIST DOCUMENTED IN MEDICAL RECORD: ICD-10-PCS | Mod: CPTII,S$GLB,, | Performed by: INTERNAL MEDICINE

## 2023-04-24 PROCEDURE — 3008F BODY MASS INDEX DOCD: CPT | Mod: CPTII,S$GLB,, | Performed by: INTERNAL MEDICINE

## 2023-04-24 PROCEDURE — 3079F DIAST BP 80-89 MM HG: CPT | Mod: CPTII,S$GLB,, | Performed by: INTERNAL MEDICINE

## 2023-04-24 PROCEDURE — 1159F MED LIST DOCD IN RCRD: CPT | Mod: CPTII,S$GLB,, | Performed by: INTERNAL MEDICINE

## 2023-04-24 PROCEDURE — 99214 OFFICE O/P EST MOD 30 MIN: CPT | Mod: S$GLB,,, | Performed by: INTERNAL MEDICINE

## 2023-04-24 PROCEDURE — 99999 PR PBB SHADOW E&M-EST. PATIENT-LVL IV: CPT | Mod: PBBFAC,,, | Performed by: INTERNAL MEDICINE

## 2023-04-24 PROCEDURE — 1160F PR REVIEW ALL MEDS BY PRESCRIBER/CLIN PHARMACIST DOCUMENTED: ICD-10-PCS | Mod: CPTII,S$GLB,, | Performed by: INTERNAL MEDICINE

## 2023-04-24 PROCEDURE — 3008F PR BODY MASS INDEX (BMI) DOCUMENTED: ICD-10-PCS | Mod: CPTII,S$GLB,, | Performed by: INTERNAL MEDICINE

## 2023-04-24 PROCEDURE — 3079F PR MOST RECENT DIASTOLIC BLOOD PRESSURE 80-89 MM HG: ICD-10-PCS | Mod: CPTII,S$GLB,, | Performed by: INTERNAL MEDICINE

## 2023-04-24 PROCEDURE — 3074F PR MOST RECENT SYSTOLIC BLOOD PRESSURE < 130 MM HG: ICD-10-PCS | Mod: CPTII,S$GLB,, | Performed by: INTERNAL MEDICINE

## 2023-04-24 PROCEDURE — 99999 PR PBB SHADOW E&M-EST. PATIENT-LVL IV: ICD-10-PCS | Mod: PBBFAC,,, | Performed by: INTERNAL MEDICINE

## 2023-04-24 PROCEDURE — 1160F RVW MEDS BY RX/DR IN RCRD: CPT | Mod: CPTII,S$GLB,, | Performed by: INTERNAL MEDICINE

## 2023-04-24 RX ORDER — BUTALBITAL, ACETAMINOPHEN AND CAFFEINE 50; 325; 40 MG/1; MG/1; MG/1
1 TABLET ORAL EVERY 6 HOURS PRN
Qty: 60 TABLET | Refills: 0 | Status: SHIPPED | OUTPATIENT
Start: 2023-04-24 | End: 2023-05-09 | Stop reason: SDUPTHER

## 2023-04-24 NOTE — PROGRESS NOTES
[facility] Note    Chief Complaint      Chief Complaint   Patient presents with    Annual Exam       History of Present Illness      Alcira Selby is a 57 y.o. female who presents today for   Chief Complaint   Patient presents with    Annual Exam   .  Patient comes to appointment here for yearly checkup for chronic issues as below . She is due for ful labs with this visit . She has received Covid vaccine x 4 .she si due for colonoscopy with dr fraire is scheduled in 2 weeks . Mammogram is uptopdate     Problem List Items Addressed This Visit          Neuro    Chronic tension-type headache, not intractable    Overview     needs refill of fiorcet is workign well stable               Cardiac/Vascular    Mixed hyperlipidemia - Primary    Overview     Diet only repeat cmp and lipid               Renal/    OAB (overactive bladder)    Overview     No meds cont kegel               Oncology    Ductal carcinoma in situ (DCIS) of right breast    Overview     Is seeing dr lee  surgeon for initial surgery and surveillance  will be seeing dr mendez for breast reconstruction. Also is seeing dr wilkerson.   Awaiting dr mendez and insurance coverage for reconstructive procedure . Has had reconstructive surgery in June .is now seeing dr wilkerson for routine surveillance     2/24/21 stable as above   08/25/2021 stable   4/24/23 dr wilkerson managing surveillance               Endocrine    Thyroid goiter    Overview     Repeat tsh            Severe obesity (BMI 35.0-39.9) with comorbidity    Overview     counciled on diet and exercise               Orthopedic    Primary osteoarthritis of left knee    Overview     stable with current reigmen                 Past Medical History:  Past Medical History:   Diagnosis Date    Breast cancer     2004    Personal history of colonic polyps        Past Surgical History:  Past Surgical History:   Procedure Laterality Date    AXILLARY NODE DISSECTION Right  7/17/2019    Procedure: LYMPHADENECTOMY, AXILLARY RIGHT;  Surgeon: Darin Vaughan MD;  Location: [facility] OR 2ND FLR;  Service: General;  Laterality: Right;    BREAST SURGERY      COLONOSCOPY N/A 12/12/2019    Procedure: COLONOSCOPY;  Surgeon: Estephanie Pratt MD;  Location: [facility] ENDO (4TH FLR);  Service: Endoscopy;  Laterality: N/A;    FOOT SURGERY Bilateral     Plantar Fascities    HYSTERECTOMY      INCISION AND DRAINAGE Right 8/13/2019    Procedure: Incision and Drainage - right chest wall;  Surgeon: Darin Vaughan MD;  Location: [facility] OR 2ND FLR;  Service: General;  Laterality: Right;    KNEE SURGERY Left     Total Knee Replacement    MASTECTOMY      MASTECTOMY Right 7/17/2019    Procedure: MASTECTOMY RIGHT;  Surgeon: Darin Vaughan MD;  Location: [facility] OR 2ND FLR;  Service: General;  Laterality: Right;    MYOMECTOMY      SENTINEL LYMPH NODE BIOPSY Right 7/17/2019    Procedure: BIOPSY, LYMPH NODE, SENTINEL RIGHT;  Surgeon: Darin Vaughan MD;  Location: [facility] OR 2ND FLR;  Service: General;  Laterality: Right;    TONSILLECTOMY         Family History:  family history includes Cancer in her maternal aunt; Hypertension in her father and mother.    Social History:  Social History     Socioeconomic History    Marital status:    Tobacco Use    Smoking status: Never    Smokeless tobacco: Never   Substance and Sexual Activity    Alcohol use: No    Drug use: No       Review of Systems:   Review of Systems   Constitutional:  Negative for fever and weight loss.   HENT:  Negative for congestion, hearing loss and sore throat.    Eyes:  Negative for blurred vision.   Respiratory:  Negative for cough and shortness of breath.    Cardiovascular:  Negative for chest pain, palpitations, claudication and leg swelling.   Gastrointestinal:  Negative for abdominal pain, constipation, diarrhea and heartburn.   Genitourinary:  Negative for dysuria.   Musculoskeletal:  Negative for back pain and myalgias.   Skin:   Negative for rash.   Neurological:  Negative for focal weakness and headaches.   Psychiatric/Behavioral:  Negative for depression and suicidal ideas. The patient is not nervous/anxious.        Medications:  Outpatient Encounter Medications as of 4/24/2023   Medication Sig Note Dispense Refill    cyanocobalamin (VITAMIN B-12) 100 MCG tablet Take by mouth. 7/16/2019: Hold am of procedure       diazePAM (VALIUM) 5 MG tablet        docusate sodium (COLACE) 100 MG capsule Take 100 mg by mouth 2 (two) times daily. 7/16/2019: Hold  am of procedure       fluticasone propionate (FLONASE ALLERGY RELIEF NASL) 50 mcg by Nasal route.       fluticasone propionate (FLONASE) 50 mcg/actuation nasal spray 1 spray (50 mcg total) by Each Nostril route once daily.  32 g 3    gabapentin (NEURONTIN) 300 MG capsule TAKE 1 CAPSULE(300 MG) BY MOUTH EVERY EVENING  30 capsule 5    multivit-min/iron/folic/lutein (MULTIVITAMIN WOMEN 50 PLUS ORAL)        ondansetron (ZOFRAN) 4 MG tablet Take 1 tablet (4 mg total) by mouth every 8 (eight) hours as needed for Nausea.  10 tablet 3    temazepam (RESTORIL) 15 mg Cap temazepam 15 mg capsule   TK 1 C PO QD HS PRN       triamcinolone acetonide 0.1% (KENALOG) 0.1 % cream APPLY AA BID PRN   0    vitamin B complex (SUPER B COMPLEX-B-12 ORAL) Take by mouth. 7/16/2019: Hold am of procedure       vitamin B complex (SUPER B COMPLEX-B-12 ORAL) Take by mouth.       [DISCONTINUED] butalbital-acetaminophen-caffeine -40 mg (FIORICET, ESGIC) -40 mg per tablet Take 1 tablet by mouth every 6 (six) hours as needed for Headaches.  60 tablet 0    butalbital-acetaminophen-caffeine -40 mg (FIORICET, ESGIC) -40 mg per tablet Take 1 tablet by mouth every 6 (six) hours as needed for Headaches.  60 tablet 0    scopolamine (TRANSDERM-SCOP) 1.3-1.5 mg (1 mg over 3 days) Place 1 patch onto the skin Every 3 (three) days. (Patient not taking: Reported on 4/24/2023)  4 patch 0     No facility-administered  encounter medications on file as of 4/24/2023.        Allergies:  Review of patient's allergies indicates:   Allergen Reactions    Sulfamethoxazole-trimethoprim Hives and Itching         Physical Exam         Vitals:    04/24/23 1122   BP: 122/80   Pulse: 60   Resp: 18   Temp: 98.7 °F (37.1 °C)         Physical Exam  Constitutional:       Appearance: She is well-developed.   Eyes:      Pupils: Pupils are equal, round, and reactive to light.   Neck:      Thyroid: No thyromegaly.   Cardiovascular:      Rate and Rhythm: Normal rate.      Heart sounds: Normal heart sounds. No murmur heard.    No friction rub. No gallop.   Pulmonary:      Breath sounds: Normal breath sounds.   Abdominal:      General: Bowel sounds are normal.      Palpations: Abdomen is soft.   Musculoskeletal:         General: Normal range of motion.      Cervical back: Normal range of motion.   Lymphadenopathy:      Cervical: No cervical adenopathy.   Skin:     General: Skin is warm.      Findings: No rash.   Neurological:      Mental Status: She is alert and oriented to person, place, and time.      Cranial Nerves: No cranial nerve deficit.   Psychiatric:         Behavior: Behavior normal.        Laboratory:  CBC:  No results for input(s): WBC, RBC, HGB, HCT, PLT, MCV, MCH, MCHC in the last 2160 hours.  CMP:  No results for input(s): GLU, CALCIUM, ALBUMIN, PROT, NA, K, CO2, CL, BUN, ALKPHOS, ALT, AST, BILITOT in the last 2160 hours.    Invalid input(s): CREATININ  URINALYSIS:  No results for input(s): COLORU, CLARITYU, SPECGRAV, PHUR, PROTEINUA, GLUCOSEU, BILIRUBINCON, BLOODU, WBCU, RBCU, BACTERIA, MUCUS, NITRITE, LEUKOCYTESUR, UROBILINOGEN, HYALINECASTS in the last 2160 hours.   LIPIDS:  No results for input(s): TSH, HDL, CHOL, TRIG, LDLCALC, CHOLHDL, NONHDLCHOL, TOTALCHOLEST in the last 2160 hours.  TSH:  No results for input(s): TSH in the last 2160 hours.  A1C:  No results for input(s): HGBA1C in the last 2160 hours.    Radiology:         Assessment:     Alcira Selby is a 57 y.o.female with:    Mixed hyperlipidemia  -     Comprehensive Metabolic Panel; Future; Expected date: 04/24/2023  -     Lipid Panel; Future; Expected date: 04/24/2023    Chronic tension-type headache, not intractable  -     butalbital-acetaminophen-caffeine -40 mg (FIORICET, ESGIC) -40 mg per tablet; Take 1 tablet by mouth every 6 (six) hours as needed for Headaches.  Dispense: 60 tablet; Refill: 0    Primary osteoarthritis of left knee    OAB (overactive bladder)    Ductal carcinoma in situ (DCIS) of right breast    Severe obesity (BMI 35.0-39.9) with comorbidity    Thyroid goiter  -     TSH; Future; Expected date: 04/24/2023          Plan:     Problem List Items Addressed This Visit          Neuro    Chronic tension-type headache, not intractable    Overview     needs refill of fiorcet is workign well stable               Cardiac/Vascular    Mixed hyperlipidemia - Primary    Overview     Diet only repeat cmp and lipid               Renal/    OAB (overactive bladder)    Overview     No meds cont kegel               Oncology    Ductal carcinoma in situ (DCIS) of right breast    Overview     Is seeing dr lee  surgeon for initial surgery and surveillance  will be seeing dr mendez for breast reconstruction. Also is seeing dr wilkerson.   Awaiting dr mendez and insurance coverage for reconstructive procedure . Has had reconstructive surgery in June .is now seeing dr wilkerson for routine surveillance     2/24/21 stable as above   08/25/2021 stable   4/24/23 dr wilkerson managing surveillance               Endocrine    Thyroid goiter    Overview     Repeat tsh            Severe obesity (BMI 35.0-39.9) with comorbidity    Overview     counciled on diet and exercise               Orthopedic    Primary osteoarthritis of left knee    Overview     stable with current reigmen               As above, continue current medications and maintain follow up with  specialists.  Return to clinic in 6 months.      Frederick W Dantagnan [facility]

## 2023-04-25 ENCOUNTER — LAB VISIT (OUTPATIENT)
Dept: LAB | Facility: HOSPITAL | Age: 58
End: 2023-04-25
Attending: INTERNAL MEDICINE
Payer: MEDICARE

## 2023-04-25 DIAGNOSIS — E78.2 MIXED HYPERLIPIDEMIA: ICD-10-CM

## 2023-04-25 DIAGNOSIS — E04.9 THYROID GOITER: ICD-10-CM

## 2023-04-25 LAB
ALBUMIN SERPL BCP-MCNC: 4 G/DL (ref 3.5–5.2)
ALP SERPL-CCNC: 83 U/L (ref 55–135)
ALT SERPL W/O P-5'-P-CCNC: 17 U/L (ref 10–44)
ANION GAP SERPL CALC-SCNC: 9 MMOL/L (ref 8–16)
AST SERPL-CCNC: 17 U/L (ref 10–40)
BILIRUB SERPL-MCNC: 0.3 MG/DL (ref 0.1–1)
BUN SERPL-MCNC: 13 MG/DL (ref 6–20)
CALCIUM SERPL-MCNC: 9.2 MG/DL (ref 8.7–10.5)
CHLORIDE SERPL-SCNC: 107 MMOL/L (ref 95–110)
CHOLEST SERPL-MCNC: 240 MG/DL (ref 120–199)
CHOLEST/HDLC SERPL: 4.1 {RATIO} (ref 2–5)
CO2 SERPL-SCNC: 27 MMOL/L (ref 23–29)
CREAT SERPL-MCNC: 0.8 MG/DL (ref 0.5–1.4)
EST. GFR  (NO RACE VARIABLE): >60 ML/MIN/1.73 M^2
GLUCOSE SERPL-MCNC: 93 MG/DL (ref 70–110)
HDLC SERPL-MCNC: 58 MG/DL (ref 40–75)
HDLC SERPL: 24.2 % (ref 20–50)
LDLC SERPL CALC-MCNC: 162 MG/DL (ref 63–159)
NONHDLC SERPL-MCNC: 182 MG/DL
POTASSIUM SERPL-SCNC: 3.8 MMOL/L (ref 3.5–5.1)
PROT SERPL-MCNC: 6.6 G/DL (ref 6–8.4)
SODIUM SERPL-SCNC: 143 MMOL/L (ref 136–145)
TRIGL SERPL-MCNC: 100 MG/DL (ref 30–150)
TSH SERPL DL<=0.005 MIU/L-ACNC: 1.5 UIU/ML (ref 0.4–4)

## 2023-04-25 PROCEDURE — 80053 COMPREHEN METABOLIC PANEL: CPT | Performed by: INTERNAL MEDICINE

## 2023-04-25 PROCEDURE — 84443 ASSAY THYROID STIM HORMONE: CPT | Performed by: INTERNAL MEDICINE

## 2023-04-25 PROCEDURE — 36415 COLL VENOUS BLD VENIPUNCTURE: CPT | Performed by: INTERNAL MEDICINE

## 2023-04-25 PROCEDURE — 80061 LIPID PANEL: CPT | Performed by: INTERNAL MEDICINE

## 2023-05-09 DIAGNOSIS — G44.229 CHRONIC TENSION-TYPE HEADACHE, NOT INTRACTABLE: ICD-10-CM

## 2023-05-09 RX ORDER — BUTALBITAL, ACETAMINOPHEN AND CAFFEINE 50; 325; 40 MG/1; MG/1; MG/1
1 TABLET ORAL EVERY 6 HOURS PRN
Qty: 60 TABLET | Refills: 0 | Status: SHIPPED | OUTPATIENT
Start: 2023-05-09 | End: 2023-06-19 | Stop reason: SDUPTHER

## 2023-05-11 ENCOUNTER — PATIENT OUTREACH (OUTPATIENT)
Dept: ADMINISTRATIVE | Facility: HOSPITAL | Age: 58
End: 2023-05-11
Payer: MEDICARE

## 2023-05-11 LAB — CRC RECOMMENDATION EXT: NORMAL

## 2023-05-11 NOTE — PROGRESS NOTES
Health Maintenance Due   Topic Date Due    Hepatitis C Screening  Never done    HIV Screening  Never done    Hemoglobin A1c (Diabetic Prevention Screening)  Never done    Shingles Vaccine (1 of 2) Never done    COVID-19 Vaccine (5 - Booster for Pfizer series) 09/17/2022     Triggered LINKS and reconciled immunizations. Updated Care Everywhere. Imported outside mammography results found in Care Everywhere into . Imported outside colonoscopy results received from Dr. Margarito Mosqueda into .  frequency set to repeat colonoscopy in 5 yrs per provider recommendation. Chart review completed.

## 2023-06-19 DIAGNOSIS — G44.229 CHRONIC TENSION-TYPE HEADACHE, NOT INTRACTABLE: ICD-10-CM

## 2023-06-19 RX ORDER — BUTALBITAL, ACETAMINOPHEN AND CAFFEINE 50; 325; 40 MG/1; MG/1; MG/1
1 TABLET ORAL EVERY 6 HOURS PRN
Qty: 60 TABLET | Refills: 0 | Status: SHIPPED | OUTPATIENT
Start: 2023-06-19 | End: 2023-07-18 | Stop reason: SDUPTHER

## 2023-07-18 DIAGNOSIS — G44.229 CHRONIC TENSION-TYPE HEADACHE, NOT INTRACTABLE: ICD-10-CM

## 2023-07-18 RX ORDER — BUTALBITAL, ACETAMINOPHEN AND CAFFEINE 50; 325; 40 MG/1; MG/1; MG/1
1 TABLET ORAL EVERY 6 HOURS PRN
Qty: 60 TABLET | Refills: 0 | Status: SHIPPED | OUTPATIENT
Start: 2023-07-18 | End: 2023-08-12 | Stop reason: SDUPTHER

## 2023-08-12 DIAGNOSIS — G44.229 CHRONIC TENSION-TYPE HEADACHE, NOT INTRACTABLE: ICD-10-CM

## 2023-08-14 RX ORDER — BUTALBITAL, ACETAMINOPHEN AND CAFFEINE 50; 325; 40 MG/1; MG/1; MG/1
1 TABLET ORAL EVERY 6 HOURS PRN
Qty: 60 TABLET | Refills: 0 | Status: SHIPPED | OUTPATIENT
Start: 2023-08-14 | End: 2023-09-20 | Stop reason: SDUPTHER

## 2023-09-20 DIAGNOSIS — G44.229 CHRONIC TENSION-TYPE HEADACHE, NOT INTRACTABLE: ICD-10-CM

## 2023-09-21 RX ORDER — BUTALBITAL, ACETAMINOPHEN AND CAFFEINE 50; 325; 40 MG/1; MG/1; MG/1
1 TABLET ORAL EVERY 6 HOURS PRN
Qty: 60 TABLET | Refills: 0 | Status: SHIPPED | OUTPATIENT
Start: 2023-09-21 | End: 2023-10-23 | Stop reason: SDUPTHER

## 2023-09-21 NOTE — TELEPHONE ENCOUNTER
No care due was identified.  [facility] Embedded Care Due Messages. Reference number: [id number].   9/20/2023 11:49:07 PM CDT

## 2023-09-24 ENCOUNTER — PATIENT MESSAGE (OUTPATIENT)
Dept: PRIMARY CARE CLINIC | Facility: CLINIC | Age: 58
End: 2023-09-24
Payer: MEDICARE

## 2023-09-24 DIAGNOSIS — J01.10 ACUTE NON-RECURRENT FRONTAL SINUSITIS: Primary | ICD-10-CM

## 2023-09-26 ENCOUNTER — OFFICE VISIT (OUTPATIENT)
Dept: PRIMARY CARE CLINIC | Facility: CLINIC | Age: 58
End: 2023-09-26
Payer: COMMERCIAL

## 2023-09-26 VITALS
WEIGHT: 235.44 LBS | DIASTOLIC BLOOD PRESSURE: 78 MMHG | SYSTOLIC BLOOD PRESSURE: 124 MMHG | TEMPERATURE: 98 F | HEIGHT: 68 IN | BODY MASS INDEX: 35.68 KG/M2 | RESPIRATION RATE: 18 BRPM

## 2023-09-26 DIAGNOSIS — J06.9 VIRAL URI WITH COUGH: Primary | ICD-10-CM

## 2023-09-26 PROCEDURE — 3074F SYST BP LT 130 MM HG: CPT | Mod: CPTII,S$GLB,, | Performed by: INTERNAL MEDICINE

## 2023-09-26 PROCEDURE — 99999 PR PBB SHADOW E&M-EST. PATIENT-LVL III: ICD-10-PCS | Mod: PBBFAC,,, | Performed by: INTERNAL MEDICINE

## 2023-09-26 PROCEDURE — 1160F PR REVIEW ALL MEDS BY PRESCRIBER/CLIN PHARMACIST DOCUMENTED: ICD-10-PCS | Mod: CPTII,S$GLB,, | Performed by: INTERNAL MEDICINE

## 2023-09-26 PROCEDURE — 3078F DIAST BP <80 MM HG: CPT | Mod: CPTII,S$GLB,, | Performed by: INTERNAL MEDICINE

## 2023-09-26 PROCEDURE — 1160F RVW MEDS BY RX/DR IN RCRD: CPT | Mod: CPTII,S$GLB,, | Performed by: INTERNAL MEDICINE

## 2023-09-26 PROCEDURE — 3008F BODY MASS INDEX DOCD: CPT | Mod: CPTII,S$GLB,, | Performed by: INTERNAL MEDICINE

## 2023-09-26 PROCEDURE — 99999 PR PBB SHADOW E&M-EST. PATIENT-LVL III: CPT | Mod: PBBFAC,,, | Performed by: INTERNAL MEDICINE

## 2023-09-26 PROCEDURE — 1159F PR MEDICATION LIST DOCUMENTED IN MEDICAL RECORD: ICD-10-PCS | Mod: CPTII,S$GLB,, | Performed by: INTERNAL MEDICINE

## 2023-09-26 PROCEDURE — 3074F PR MOST RECENT SYSTOLIC BLOOD PRESSURE < 130 MM HG: ICD-10-PCS | Mod: CPTII,S$GLB,, | Performed by: INTERNAL MEDICINE

## 2023-09-26 PROCEDURE — 99214 PR OFFICE/OUTPT VISIT, EST, LEVL IV, 30-39 MIN: ICD-10-PCS | Mod: S$GLB,,, | Performed by: INTERNAL MEDICINE

## 2023-09-26 PROCEDURE — 3078F PR MOST RECENT DIASTOLIC BLOOD PRESSURE < 80 MM HG: ICD-10-PCS | Mod: CPTII,S$GLB,, | Performed by: INTERNAL MEDICINE

## 2023-09-26 PROCEDURE — 1159F MED LIST DOCD IN RCRD: CPT | Mod: CPTII,S$GLB,, | Performed by: INTERNAL MEDICINE

## 2023-09-26 PROCEDURE — 99214 OFFICE O/P EST MOD 30 MIN: CPT | Mod: S$GLB,,, | Performed by: INTERNAL MEDICINE

## 2023-09-26 PROCEDURE — 3008F PR BODY MASS INDEX (BMI) DOCUMENTED: ICD-10-PCS | Mod: CPTII,S$GLB,, | Performed by: INTERNAL MEDICINE

## 2023-09-26 RX ORDER — BROMPHENIRAMINE MALEATE, PSEUDOEPHEDRINE HYDROCHLORIDE, AND DEXTROMETHORPHAN HYDROBROMIDE 2; 30; 10 MG/5ML; MG/5ML; MG/5ML
5 SYRUP ORAL 4 TIMES DAILY PRN
Qty: 118 ML | Refills: 0 | Status: SHIPPED | OUTPATIENT
Start: 2023-09-26 | End: 2023-10-06

## 2023-09-26 NOTE — PROGRESS NOTES
[facility] Note    Chief Complaint      Chief Complaint   Patient presents with    Cough       History of Present Illness      Alcira Selby is a 58 y.o. female who presents today for   Chief Complaint   Patient presents with    Cough   .  Patient comes to appointment here for acurte visit relate dto yobany . She ahs beenn deallingnwiht ocugh congestion . She states starting on Friday with sinus pressure , runny nose , congestion and purulent drainage with cough home covid test negative    Problem List Items Addressed This Visit          ENT    Viral URI with cough - Primary    Overview     bromphed 5ml po q6 prn cough congestion               Past Medical History:  Past Medical History:   Diagnosis Date    Breast cancer     2004    Personal history of colonic polyps        Past Surgical History:  Past Surgical History:   Procedure Laterality Date    AXILLARY NODE DISSECTION Right 7/17/2019    Procedure: LYMPHADENECTOMY, AXILLARY RIGHT;  Surgeon: Darin Vaughan MD;  Location: [address];  Service: General;  Laterality: Right;    BREAST SURGERY      COLONOSCOPY N/A 12/12/2019    Procedure: COLONOSCOPY;  Surgeon: Estephanie Pratt MD;  Location: [facility] (4TH FLR);  Service: Endoscopy;  Laterality: N/A;    FOOT SURGERY Bilateral     Plantar Fascities    HYSTERECTOMY      INCISION AND DRAINAGE Right 8/13/2019    Procedure: Incision and Drainage - right chest wall;  Surgeon: Darin Vaughan MD;  Location: [address];  Service: General;  Laterality: Right;    KNEE SURGERY Left     Total Knee Replacement    MASTECTOMY      MASTECTOMY Right 7/17/2019    Procedure: MASTECTOMY RIGHT;  Surgeon: Darin Vaughan MD;  Location: [address];  Service: General;  Laterality: Right;    MYOMECTOMY      SENTINEL LYMPH NODE BIOPSY Right 7/17/2019    Procedure: BIOPSY, LYMPH NODE, SENTINEL RIGHT;  Surgeon: Darin Vaughan MD;  Location: [address];  Service: General;   Laterality: Right;    TONSILLECTOMY         Family History:  family history includes Cancer in her maternal aunt; Hypertension in her father and mother.    Social History:  Social History     Socioeconomic History    Marital status:    Tobacco Use    Smoking status: Never    Smokeless tobacco: Never   Substance and Sexual Activity    Alcohol use: No    Drug use: No     Social Determinants of Health     Financial Resource Strain: High Risk (10/5/2020)    Overall Financial Resource Strain (CARDIA)     Difficulty of Paying Living Expenses: Very hard   Food Insecurity: Food Insecurity Present (10/5/2020)    Hunger Vital Sign     Worried About Running Out of Food in the Last Year: Sometimes true     Ran Out of Food in the Last Year: Sometimes true   Transportation Needs: No Transportation Needs (10/5/2020)    PRAPARE - Transportation     Lack of Transportation (Medical): No     Lack of Transportation (Non-Medical): No   Physical Activity: Unknown (10/5/2020)    Exercise Vital Sign     Days of Exercise per Week: 7 days   Stress: No Stress Concern Present (10/5/2020)    [employer] [place] [employer] - Occupational Stress Questionnaire     Feeling of Stress : Not at all   Social Connections: Unknown (10/5/2020)    Social Connection and Isolation Panel [NHANES]     Frequency of Communication with Friends and Family: More than three times a week     Frequency of Social Gatherings with Friends and Family: More than three times a week     Active Member of Clubs or Organizations: Yes     Attends Club or Organization Meetings: Never     Marital Status:        Review of Systems:   Review of Systems   Constitutional:  Negative for chills and fever.   HENT:  Positive for congestion, nosebleeds and sinus pain.    Respiratory:  Positive for cough. Negative for shortness of breath.    Gastrointestinal:  Negative for nausea and vomiting.   Neurological:  Positive for headaches.         Medications:  Outpatient  Encounter Medications as of 9/26/2023   Medication Sig Note Dispense Refill    butalbital-acetaminophen-caffeine -40 mg (FIORICET, ESGIC) -40 mg per tablet Take 1 tablet by mouth every 6 (six) hours as needed for Headaches.  60 tablet 0    cyanocobalamin (VITAMIN B-12) 100 MCG tablet Take by mouth. 7/16/2019: Hold am of procedure       diazePAM (VALIUM) 5 MG tablet        docusate sodium (COLACE) 100 MG capsule Take 100 mg by mouth 2 (two) times daily. 7/16/2019: Hold  am of procedure       fluticasone propionate (FLONASE ALLERGY RELIEF NASL) 50 mcg by Nasal route.       fluticasone propionate (FLONASE) 50 mcg/actuation nasal spray 1 spray (50 mcg total) by Each Nostril route once daily.  32 g 3    gabapentin (NEURONTIN) 300 MG capsule TAKE 1 CAPSULE(300 MG) BY MOUTH EVERY EVENING  30 capsule 5    multivit-min/iron/folic/lutein (MULTIVITAMIN WOMEN 50 PLUS ORAL)        ondansetron (ZOFRAN) 4 MG tablet Take 1 tablet (4 mg total) by mouth every 8 (eight) hours as needed for Nausea.  10 tablet 3    temazepam (RESTORIL) 15 mg Cap temazepam 15 mg capsule   TK 1 C PO QD HS PRN       triamcinolone acetonide 0.1% (KENALOG) 0.1 % cream APPLY AA BID PRN   0    vitamin B complex (SUPER B COMPLEX-B-12 ORAL) Take by mouth. 7/16/2019: Hold am of procedure       vitamin B complex (SUPER B COMPLEX-B-12 ORAL) Take by mouth.       brompheniramine-pseudoeph-DM (BROMFED DM) 2-30-10 mg/5 mL Syrp Take 5 mLs by mouth 4 (four) times daily as needed.  118 mL 0    scopolamine (TRANSDERM-SCOP) 1.3-1.5 mg (1 mg over 3 days) Place 1 patch onto the skin Every 3 (three) days. (Patient not taking: Reported on 9/26/2023)  4 patch 0     No facility-administered encounter medications on file as of 9/26/2023.        Allergies:  Review of patient's allergies indicates:   Allergen Reactions    Sulfamethoxazole-trimethoprim Hives and Itching         Physical Exam         Vitals:    09/26/23 1435   BP: 124/78   Resp: 18   Temp: 98 °F (36.7 °C)  "        Physical Exam  Constitutional:       Appearance: She is well-developed.   Eyes:      Pupils: Pupils are equal, round, and reactive to light.   Neck:      Thyroid: No thyromegaly.   Cardiovascular:      Rate and Rhythm: Normal rate.      Heart sounds: Normal heart sounds. No murmur heard.     No friction rub. No gallop.   Pulmonary:      Breath sounds: Normal breath sounds.   Abdominal:      General: Bowel sounds are normal.      Palpations: Abdomen is soft.   Musculoskeletal:         General: Normal range of motion.      Cervical back: Normal range of motion.   Lymphadenopathy:      Cervical: No cervical adenopathy.   Skin:     General: Skin is warm.      Findings: No rash.   Neurological:      Mental Status: She is alert and oriented to person, place, and time.      Cranial Nerves: No cranial nerve deficit.   Psychiatric:         Behavior: Behavior normal.          Laboratory:  CBC:  No results for input(s): "WBC", "RBC", "HGB", "HCT", "PLT", "MCV", "MCH", "MCHC" in the last 2160 hours.  CMP:  No results for input(s): "GLU", "CALCIUM", "ALBUMIN", "PROT", "NA", "K", "CO2", "CL", "BUN", "ALKPHOS", "ALT", "AST", "BILITOT" in the last 2160 hours.    Invalid input(s): "CREATININ"  URINALYSIS:  No results for input(s): "COLORU", "CLARITYU", "SPECGRAV", "PHUR", "PROTEINUA", "GLUCOSEU", "BILIRUBINCON", "BLOODU", "WBCU", "RBCU", "BACTERIA", "MUCUS", "NITRITE", "LEUKOCYTESUR", "UROBILINOGEN", "HYALINECASTS" in the last 2160 hours.   LIPIDS:  No results for input(s): "TSH", "HDL", "CHOL", "TRIG", "LDLCALC", "CHOLHDL", "NONHDLCHOL", "TOTALCHOLEST" in the last 2160 hours.  TSH:  No results for input(s): "TSH" in the last 2160 hours.  A1C:  No results for input(s): "HGBA1C" in the last 2160 hours.    Radiology:        Assessment:     Alcira Selby is a 58 y.o.female with:    Viral URI with cough  -     brompheniramine-pseudoeph-DM (BROMFED DM) 2-30-10 mg/5 mL Syrp; Take 5 mLs by mouth 4 (four) times daily as " needed.  Dispense: 118 mL; Refill: 0          Plan:     Problem List Items Addressed This Visit          ENT    Viral URI with cough - Primary    Overview     bromphed 5ml po q6 prn cough congestion             As above, continue current medications and maintain follow up with specialists.  Return to clinic in 6 months.      Frederick W Dantagnan [facility]

## 2023-09-29 RX ORDER — AZITHROMYCIN 250 MG/1
TABLET, FILM COATED ORAL
Qty: 6 TABLET | Refills: 0 | Status: SHIPPED | OUTPATIENT
Start: 2023-09-29 | End: 2023-10-04

## 2023-10-22 DIAGNOSIS — G44.229 CHRONIC TENSION-TYPE HEADACHE, NOT INTRACTABLE: ICD-10-CM

## 2023-10-22 RX ORDER — BUTALBITAL, ACETAMINOPHEN AND CAFFEINE 50; 325; 40 MG/1; MG/1; MG/1
1 TABLET ORAL EVERY 6 HOURS PRN
Qty: 60 TABLET | Refills: 0 | Status: CANCELLED | OUTPATIENT
Start: 2023-10-22

## 2023-10-23 DIAGNOSIS — G44.229 CHRONIC TENSION-TYPE HEADACHE, NOT INTRACTABLE: ICD-10-CM

## 2023-10-23 RX ORDER — BROMPHENIRAMINE MALEATE, PSEUDOEPHEDRINE HYDROCHLORIDE, AND DEXTROMETHORPHAN HYDROBROMIDE 2; 30; 10 MG/5ML; MG/5ML; MG/5ML
SYRUP ORAL
COMMUNITY
Start: 2022-12-29 | End: 2023-10-26

## 2023-10-23 RX ORDER — BUTALBITAL, ACETAMINOPHEN AND CAFFEINE 50; 325; 40 MG/1; MG/1; MG/1
1 TABLET ORAL EVERY 6 HOURS PRN
Qty: 60 TABLET | Refills: 0 | Status: SHIPPED | OUTPATIENT
Start: 2023-10-23 | End: 2023-10-23 | Stop reason: SDUPTHER

## 2023-10-23 NOTE — TELEPHONE ENCOUNTER
No care due was identified.  [facility] Embedded Care Due Messages. Reference number: [id number].   10/22/2023 10:32:36 PM CDT

## 2023-10-23 NOTE — TELEPHONE ENCOUNTER
----- Message from Mariana Delgado sent at 10/23/2023 10:48 AM CDT -----  Contact: Pt [phone]  Requesting an RX refill or new RX.  Is this a refill or new RX:   RX name and strength (copy/paste from chart):  brompheniramine-pseudoeph-DM (BROMFED DM) 2-30-10 mg/5 mL Syrp  Is this a 30 day or 90 day RX:   Pharmacy name and phone # (copy/paste from chart): [id number] - [place], [place] - [address] AT [address] [facility]   Phone:  [phone]  Fax:  [phone]         The doctors have asked that we provide their patients with the following 2 reminders -- prescription refills can take up to 72 hours, and a friendly reminder that in the future you can use your [facility] account to request refills: yes

## 2023-10-24 RX ORDER — BUTALBITAL, ACETAMINOPHEN AND CAFFEINE 50; 325; 40 MG/1; MG/1; MG/1
1 TABLET ORAL EVERY 6 HOURS PRN
Qty: 60 TABLET | Refills: 0 | Status: SHIPPED | OUTPATIENT
Start: 2023-10-24 | End: 2023-11-21 | Stop reason: SDUPTHER

## 2023-10-24 NOTE — TELEPHONE ENCOUNTER
No care due was identified.  [facility] Embedded Care Due Messages. Reference number: [id number].   10/23/2023 10:58:55 PM CDT

## 2023-10-26 RX ORDER — FLUTICASONE PROPIONATE 50 MCG
1 SPRAY, SUSPENSION (ML) NASAL
Qty: 48 G | Refills: 3 | Status: SHIPPED | OUTPATIENT
Start: 2023-10-26

## 2023-10-26 NOTE — TELEPHONE ENCOUNTER
Refill Routing Note   Medication(s) are not appropriate for processing by [facility] for the following reason(s):      Drug-disease interaction    [facility] action(s):  Defer Care Due:  None identified     Medication Therapy Plan: fluticasone propionate, FLONASE ALLERGY RELIEF NASL    Pharmacist review requested: Yes     Appointments  past 12m or future 3m with PCP    Date Provider   Last Visit   9/26/2023 Dick Cutler MD   Next Visit   10/30/2023 Dick Cutler MD   ED visits in past 90 days: 0        Note composed:12:38 PM 10/26/2023

## 2023-10-26 NOTE — TELEPHONE ENCOUNTER
No care due was identified.  [facility] Embedded Care Due Messages. Reference number: [id number].   10/26/2023 8:07:07 AM CDT

## 2023-10-26 NOTE — TELEPHONE ENCOUNTER
Refill Decision Note   Blaynemeylssa Selby  is requesting a refill authorization.  Brief Assessment and Rationale for Refill:  Approve     Medication Therapy Plan:         Pharmacist review requested: Yes   Extended chart review required: Yes   Comments:     Note composed:2:13 PM 10/26/2023

## 2023-10-30 ENCOUNTER — OFFICE VISIT (OUTPATIENT)
Dept: PRIMARY CARE CLINIC | Facility: CLINIC | Age: 58
End: 2023-10-30
Payer: MEDICARE

## 2023-10-30 VITALS
RESPIRATION RATE: 18 BRPM | WEIGHT: 236.13 LBS | OXYGEN SATURATION: 98 % | TEMPERATURE: 98 F | SYSTOLIC BLOOD PRESSURE: 128 MMHG | HEIGHT: 68 IN | DIASTOLIC BLOOD PRESSURE: 84 MMHG | BODY MASS INDEX: 35.79 KG/M2 | HEART RATE: 65 BPM

## 2023-10-30 DIAGNOSIS — E78.2 MIXED HYPERLIPIDEMIA: ICD-10-CM

## 2023-10-30 DIAGNOSIS — N32.81 OAB (OVERACTIVE BLADDER): ICD-10-CM

## 2023-10-30 DIAGNOSIS — E66.01 SEVERE OBESITY (BMI 35.0-39.9) WITH COMORBIDITY: ICD-10-CM

## 2023-10-30 DIAGNOSIS — D05.11 DUCTAL CARCINOMA IN SITU (DCIS) OF RIGHT BREAST: ICD-10-CM

## 2023-10-30 DIAGNOSIS — R00.2 PALPITATIONS: ICD-10-CM

## 2023-10-30 DIAGNOSIS — G47.00 INSOMNIA, UNSPECIFIED TYPE: Primary | ICD-10-CM

## 2023-10-30 DIAGNOSIS — G44.229 CHRONIC TENSION-TYPE HEADACHE, NOT INTRACTABLE: ICD-10-CM

## 2023-10-30 PROCEDURE — 3074F PR MOST RECENT SYSTOLIC BLOOD PRESSURE < 130 MM HG: ICD-10-PCS | Mod: CPTII,S$GLB,, | Performed by: INTERNAL MEDICINE

## 2023-10-30 PROCEDURE — 3079F PR MOST RECENT DIASTOLIC BLOOD PRESSURE 80-89 MM HG: ICD-10-PCS | Mod: CPTII,S$GLB,, | Performed by: INTERNAL MEDICINE

## 2023-10-30 PROCEDURE — 1160F RVW MEDS BY RX/DR IN RCRD: CPT | Mod: CPTII,S$GLB,, | Performed by: INTERNAL MEDICINE

## 2023-10-30 PROCEDURE — 99214 OFFICE O/P EST MOD 30 MIN: CPT | Mod: S$GLB,,, | Performed by: INTERNAL MEDICINE

## 2023-10-30 PROCEDURE — 3079F DIAST BP 80-89 MM HG: CPT | Mod: CPTII,S$GLB,, | Performed by: INTERNAL MEDICINE

## 2023-10-30 PROCEDURE — 3008F BODY MASS INDEX DOCD: CPT | Mod: CPTII,S$GLB,, | Performed by: INTERNAL MEDICINE

## 2023-10-30 PROCEDURE — 3008F PR BODY MASS INDEX (BMI) DOCUMENTED: ICD-10-PCS | Mod: CPTII,S$GLB,, | Performed by: INTERNAL MEDICINE

## 2023-10-30 PROCEDURE — 1159F PR MEDICATION LIST DOCUMENTED IN MEDICAL RECORD: ICD-10-PCS | Mod: CPTII,S$GLB,, | Performed by: INTERNAL MEDICINE

## 2023-10-30 PROCEDURE — 1159F MED LIST DOCD IN RCRD: CPT | Mod: CPTII,S$GLB,, | Performed by: INTERNAL MEDICINE

## 2023-10-30 PROCEDURE — 1160F PR REVIEW ALL MEDS BY PRESCRIBER/CLIN PHARMACIST DOCUMENTED: ICD-10-PCS | Mod: CPTII,S$GLB,, | Performed by: INTERNAL MEDICINE

## 2023-10-30 PROCEDURE — 99999 PR PBB SHADOW E&M-EST. PATIENT-LVL IV: ICD-10-PCS | Mod: PBBFAC,,, | Performed by: INTERNAL MEDICINE

## 2023-10-30 PROCEDURE — 99214 PR OFFICE/OUTPT VISIT, EST, LEVL IV, 30-39 MIN: ICD-10-PCS | Mod: S$GLB,,, | Performed by: INTERNAL MEDICINE

## 2023-10-30 PROCEDURE — 99999 PR PBB SHADOW E&M-EST. PATIENT-LVL IV: CPT | Mod: PBBFAC,,, | Performed by: INTERNAL MEDICINE

## 2023-10-30 PROCEDURE — 3074F SYST BP LT 130 MM HG: CPT | Mod: CPTII,S$GLB,, | Performed by: INTERNAL MEDICINE

## 2023-10-30 RX ORDER — TEMAZEPAM 15 MG/1
15 CAPSULE ORAL NIGHTLY PRN
Qty: 30 CAPSULE | Refills: 0 | Status: SHIPPED | OUTPATIENT
Start: 2023-10-30

## 2023-10-30 NOTE — PROGRESS NOTES
[facility] Note    Chief Complaint      Chief Complaint   Patient presents with    Follow-up     6m       History of Present Illness      Alcira Selby is a 58 y.o. female who presents today for   Chief Complaint   Patient presents with    Follow-up     6m   .  Patient comes to appointment here for 6 m checkup for chronic issues as below . She is complaint with all meds , main complaint is with insomnia . She is otherwise feeling well.headaches are stable with occasional fiorcer . Just had full labs done in April . She states she has some occasional palpitations . She deneis sob , dizziness     Problem List Items Addressed This Visit          Neuro    Chronic tension-type headache, not intractable    Overview     needs refill of fiorcet is workign well stable             Cardiac/Vascular    Mixed hyperlipidemia    Overview     Diet only repeat cmp and lipid next visit          Palpitations    Overview     holter monitor ordered             Renal/    OAB (overactive bladder)    Overview     No meds cont kegel             Oncology    Ductal carcinoma in situ (DCIS) of right breast    Overview     Is seeing dr lee  surgeon for initial surgery and surveillance  will be seeing dr mendez for breast reconstruction. Also is seeing dr wilkerson.   Awaiting dr mendez and insurance coverage for reconstructive procedure . Has had reconstructive surgery in June .is now seeing dr wilkerson for routine surveillance     2/24/21 stable as above   08/25/2021 stable   4/24/23 dr wilkerson managing surveillance             Endocrine    Severe obesity (BMI 35.0-39.9) with comorbidity    Overview     counciled on diet and exercise             Other    Insomnia - Primary    Overview     Refill temazepam              Past Medical History:  Past Medical History:   Diagnosis Date    Breast cancer     2004    Personal history of colonic polyps        Past Surgical History:  Past Surgical History:    Procedure Laterality Date    AXILLARY NODE DISSECTION Right 7/17/2019    Procedure: LYMPHADENECTOMY, AXILLARY RIGHT;  Surgeon: Darin Vaughan MD;  Location: [facility] OR 2ND FLR;  Service: General;  Laterality: Right;    BREAST SURGERY      COLONOSCOPY N/A 12/12/2019    Procedure: COLONOSCOPY;  Surgeon: Estephanie Pratt MD;  Location: [facility] ENDO (4TH FLR);  Service: Endoscopy;  Laterality: N/A;    FOOT SURGERY Bilateral     Plantar Fascities    HYSTERECTOMY      INCISION AND DRAINAGE Right 8/13/2019    Procedure: Incision and Drainage - right chest wall;  Surgeon: Darin Vaughan MD;  Location: [facility] OR 2ND FLR;  Service: General;  Laterality: Right;    KNEE SURGERY Left     Total Knee Replacement    MASTECTOMY      MASTECTOMY Right 7/17/2019    Procedure: MASTECTOMY RIGHT;  Surgeon: Darin Vaughan MD;  [facility];  Service: General;  Laterality: Right;    MYOMECTOMY      SENTINEL LYMPH NODE BIOPSY Right 7/17/2019    Procedure: BIOPSY, LYMPH NODE, SENTINEL RIGHT;  Surgeon: Darin Vaughan MD;  [facility];  Service: General;  Laterality: Right;    TONSILLECTOMY         Family History:  family history includes Cancer in her maternal aunt; Hypertension in her father and mother.    Social History:  Social History     Socioeconomic History    Marital status:    Tobacco Use    Smoking status: Never    Smokeless tobacco: Never   Substance and Sexual Activity    Alcohol use: No    Drug use: No     Social Determinants of Health     Financial Resource Strain: High Risk (10/5/2020)    Overall Financial Resource Strain (CARDIA)     Difficulty of Paying Living Expenses: Very hard   Food Insecurity: Food Insecurity Present (10/5/2020)    Hunger Vital Sign     Worried About Running Out of Food in the Last Year: Sometimes true     Ran Out of Food in the Last Year: Sometimes true   Transportation Needs: No Transportation Needs (10/5/2020)    PRAPARE - Transportation     Lack of  Transportation (Medical): No     Lack of Transportation (Non-Medical): No   Physical Activity: Unknown (10/5/2020)    Exercise Vital Sign     Days of Exercise per Week: 7 days   Stress: No Stress Concern Present (10/5/2020)    [employer] - Occupational Stress Questionnaire     Feeling of Stress : Not at all   Social Connections: Unknown (10/5/2020)    Social Connection and Isolation Panel [NHANES]     Frequency of Communication with Friends and Family: More than three times a week     Frequency of Social Gatherings with Friends and Family: More than three times a week     Active Member of Clubs or Organizations: Yes     Attends Club or Organization Meetings: Never     Marital Status:        Review of Systems:   Review of Systems   Constitutional:  Negative for fever and weight loss.   HENT:  Negative for congestion, hearing loss and sore throat.    Eyes:  Negative for blurred vision.   Respiratory:  Negative for cough and shortness of breath.    Cardiovascular:  Negative for chest pain, palpitations, claudication and leg swelling.   Gastrointestinal:  Negative for abdominal pain, constipation, diarrhea and heartburn.   Genitourinary:  Negative for dysuria.   Musculoskeletal:  Negative for back pain and myalgias.   Skin:  Negative for rash.   Neurological:  Negative for focal weakness and headaches.   Psychiatric/Behavioral:  Negative for depression and suicidal ideas. The patient is not nervous/anxious.          Medications:  Outpatient Encounter Medications as of 10/30/2023   Medication Sig Note Dispense Refill    brompheniramine-pseudoeph-DM (BROMFED DM) 2-30-10 mg/5 mL Syrp TAKE 5 ML BY MOUTH FOUR TIMES DAILY AS NEEDED  118 mL 0    butalbital-acetaminophen-caffeine -40 mg (FIORICET, ESGIC) -40 mg per tablet Take 1 tablet by mouth every 6 (six) hours as needed for Headaches.  60 tablet 0    cyanocobalamin (VITAMIN B-12) 100 MCG tablet Take by mouth. 7/16/2019: Hold  am of procedure       docusate sodium (COLACE) 100 MG capsule Take 100 mg by mouth 2 (two) times daily. 2019: Hold  am of procedure       fluticasone propionate (FLONASE ALLERGY RELIEF NASL) 50 mcg by Nasal route.       fluticasone propionate (FLONASE) 50 mcg/actuation nasal spray INHALE 1 SPRAY IN EACH NOSTRIL EVERY DAY.  48 g 3    gabapentin (NEURONTIN) 300 MG capsule TAKE 1 CAPSULE(300 MG) BY MOUTH EVERY EVENING  30 capsule 5    multivit-min/iron/folic/lutein (MULTIVITAMIN WOMEN 50 PLUS ORAL)        ondansetron (ZOFRAN) 4 MG tablet Take 1 tablet (4 mg total) by mouth every 8 (eight) hours as needed for Nausea.  10 tablet 3    scopolamine (TRANSDERM-SCOP) 1.3-1.5 mg (1 mg over 3 days) Place 1 patch onto the skin Every 3 (three) days.  4 patch 0    triamcinolone acetonide 0.1% (KENALOG) 0.1 % cream APPLY AA BID PRN   0    vitamin B complex (SUPER B COMPLEX-B-12 ORAL) Take by mouth. 2019: Hold am of procedure       vitamin B complex (SUPER B COMPLEX-B-12 ORAL) Take by mouth.       [DISCONTINUED] diazePAM (VALIUM) 5 MG tablet        [DISCONTINUED] temazepam (RESTORIL) 15 mg Cap temazepam 15 mg capsule   TK 1 C PO QD HS PRN       [] azithromycin (Z-XENIA) 250 MG tablet Take 2 tablets by mouth on day 1; Take 1 tablet by mouth on days 2-5  6 tablet 0    [] brompheniramine-pseudoeph-DM (BROMFED DM) 2-30-10 mg/5 mL Syrp Take 5 mLs by mouth 4 (four) times daily as needed.  118 mL 0    temazepam (RESTORIL) 15 mg Cap Take 1 capsule (15 mg total) by mouth nightly as needed.  30 capsule 0    [DISCONTINUED] brompheniramine-pseudoeph-DM (BROMFED DM) 2-30-10 mg/5 mL Syrp TAKE 5 ML BY MOUTH FOUR TIMES DAILY AS NEEDED       [DISCONTINUED] butalbital-acetaminophen-caffeine -40 mg (FIORICET, ESGIC) -40 mg per tablet Take 1 tablet by mouth every 6 (six) hours as needed for Headaches.  60 tablet 0    [DISCONTINUED] butalbital-acetaminophen-caffeine -40 mg (FIORICET, ESGIC) -40 mg per tablet  "Take 1 tablet by mouth every 6 (six) hours as needed for Headaches.  60 tablet 0    [DISCONTINUED] fluticasone propionate (FLONASE) 50 mcg/actuation nasal spray 1 spray (50 mcg total) by Each Nostril route once daily.  32 g 3     No facility-administered encounter medications on file as of 10/30/2023.        Allergies:  Review of patient's allergies indicates:   Allergen Reactions    Sulfamethoxazole-trimethoprim Hives and Itching         Physical Exam         Vitals:    10/30/23 1435   BP: 128/84   Pulse: 65   Resp: 18   Temp: 98 °F (36.7 °C)         Physical Exam  Constitutional:       Appearance: She is well-developed.   Eyes:      Pupils: Pupils are equal, round, and reactive to light.   Neck:      Thyroid: No thyromegaly.   Cardiovascular:      Rate and Rhythm: Normal rate.      Heart sounds: Normal heart sounds. No murmur heard.     No friction rub. No gallop.   Pulmonary:      Breath sounds: Normal breath sounds.   Abdominal:      General: Bowel sounds are normal.      Palpations: Abdomen is soft.   Musculoskeletal:         General: Normal range of motion.      Cervical back: Normal range of motion.   Lymphadenopathy:      Cervical: No cervical adenopathy.   Skin:     General: Skin is warm.      Findings: No rash.   Neurological:      Mental Status: She is alert and oriented to person, place, and time.      Cranial Nerves: No cranial nerve deficit.   Psychiatric:         Behavior: Behavior normal.          Laboratory:  CBC:  No results for input(s): "WBC", "RBC", "HGB", "HCT", "PLT", "MCV", "MCH", "MCHC" in the last 2160 hours.  CMP:  No results for input(s): "GLU", "CALCIUM", "ALBUMIN", "PROT", "NA", "K", "CO2", "CL", "BUN", "ALKPHOS", "ALT", "AST", "BILITOT" in the last 2160 hours.    Invalid input(s): "CREATININ"  URINALYSIS:  No results for input(s): "COLORU", "CLARITYU", "SPECGRAV", "PHUR", "PROTEINUA", "GLUCOSEU", "BILIRUBINCON", "BLOODU", "WBCU", "RBCU", "BACTERIA", "MUCUS", "NITRITE", "LEUKOCYTESUR", " ""UROBILINOGEN", "HYALINECASTS" in the last 2160 hours.   LIPIDS:  No results for input(s): "TSH", "HDL", "CHOL", "TRIG", "LDLCALC", "CHOLHDL", "NONHDLCHOL", "TOTALCHOLEST" in the last 2160 hours.  TSH:  No results for input(s): "TSH" in the last 2160 hours.  A1C:  No results for input(s): "HGBA1C" in the last 2160 hours.    Radiology:        Assessment:     Alcira Selby is a 58 y.o.female with:    Insomnia, unspecified type  -     temazepam (RESTORIL) 15 mg Cap; Take 1 capsule (15 mg total) by mouth nightly as needed.  Dispense: 30 capsule; Refill: 0    Palpitations  -     Holter monitor - 48 hour; Future    Chronic tension-type headache, not intractable    Mixed hyperlipidemia    OAB (overactive bladder)    Ductal carcinoma in situ (DCIS) of right breast    Severe obesity (BMI 35.0-39.9) with comorbidity          Plan:     Problem List Items Addressed This Visit          Neuro    Chronic tension-type headache, not intractable    Overview     needs refill of fiorcet is workign well stable             Cardiac/Vascular    Mixed hyperlipidemia    Overview     Diet only repeat cmp and lipid next visit          Palpitations    Overview     holter monitor ordered             Renal/    OAB (overactive bladder)    Overview     No meds cont kegel             Oncology    Ductal carcinoma in situ (DCIS) of right breast    Overview     Is seeing dr lee  surgeon for initial surgery and surveillance  will be seeing dr mendez for breast reconstruction. Also is seeing dr wilkerson.   Awaiting dr mendez and insurance coverage for reconstructive procedure . Has had reconstructive surgery in June .is now seeing dr wilkerson for routine surveillance     2/24/21 stable as above   08/25/2021 stable   4/24/23 dr wilkerson managing surveillance             Endocrine    Severe obesity (BMI 35.0-39.9) with comorbidity    Overview     counciled on diet and exercise             Other    Insomnia - Primary    Overview     Refill " temazepam            As above, continue current medications and maintain follow up with specialists.  Return to clinic in 6 months.      Frederick W Dantagnan [facility]

## 2023-11-21 DIAGNOSIS — G44.229 CHRONIC TENSION-TYPE HEADACHE, NOT INTRACTABLE: ICD-10-CM

## 2023-11-21 RX ORDER — BUTALBITAL, ACETAMINOPHEN AND CAFFEINE 50; 325; 40 MG/1; MG/1; MG/1
1 TABLET ORAL EVERY 6 HOURS PRN
Qty: 60 TABLET | Refills: 0 | Status: SHIPPED | OUTPATIENT
Start: 2023-11-21 | End: 2023-12-26 | Stop reason: SDUPTHER

## 2023-11-21 NOTE — TELEPHONE ENCOUNTER
No care due was identified.  Health [facility] Embedded Care Due Messages. Reference number: [id number].   11/21/2023 12:01:11 PM CST

## 2023-12-26 DIAGNOSIS — G44.229 CHRONIC TENSION-TYPE HEADACHE, NOT INTRACTABLE: ICD-10-CM

## 2023-12-26 RX ORDER — BROMPHENIRAMINE MALEATE, PSEUDOEPHEDRINE HYDROCHLORIDE, AND DEXTROMETHORPHAN HYDROBROMIDE 2; 30; 10 MG/5ML; MG/5ML; MG/5ML
SYRUP ORAL
Qty: 118 ML | Refills: 0 | Status: SHIPPED | OUTPATIENT
Start: 2023-12-26 | End: 2024-01-22 | Stop reason: SDUPTHER

## 2023-12-26 RX ORDER — BUTALBITAL, ACETAMINOPHEN AND CAFFEINE 50; 325; 40 MG/1; MG/1; MG/1
1 TABLET ORAL EVERY 6 HOURS PRN
Qty: 60 TABLET | Refills: 0 | Status: SHIPPED | OUTPATIENT
Start: 2023-12-26 | End: 2024-01-22 | Stop reason: SDUPTHER

## 2023-12-26 NOTE — TELEPHONE ENCOUNTER
No care due was identified.  Health [facility] Embedded Care Due Messages. Reference number: [id number].   12/26/2023 11:40:46 AM CST

## 2023-12-26 NOTE — TELEPHONE ENCOUNTER
----- Message from Day Coyne sent at 12/26/2023  2:03 PM CST -----  Contact: [phone]  Pt would like to have a stronger cough syrup called in. The cough syrup she is currently taking is not working. Pt is also requesting a refill on her butalbital-acetaminophen-caffeine -40 mg (FIORICET, ESGIC) -40 mg per tablet 60 tablet.    Pt is using   [id number] - [place] [address] AT [address]  [place] [address]  Phone: [phone] Fax: [phone]    Per pt, she also sent a message in the portal.              Thank you

## 2023-12-28 DIAGNOSIS — Z20.828 EXPOSURE TO THE FLU: ICD-10-CM

## 2023-12-28 DIAGNOSIS — J40 BRONCHITIS: Primary | ICD-10-CM

## 2023-12-28 DIAGNOSIS — R05.1 ACUTE COUGH: ICD-10-CM

## 2023-12-28 RX ORDER — OSELTAMIVIR PHOSPHATE 75 MG/1
75 CAPSULE ORAL 2 TIMES DAILY
Qty: 10 CAPSULE | Refills: 0 | Status: SHIPPED | OUTPATIENT
Start: 2023-12-28 | End: 2024-01-02

## 2023-12-28 RX ORDER — CODEINE PHOSPHATE AND GUAIFENESIN 10; 100 MG/5ML; MG/5ML
5 SOLUTION ORAL 3 TIMES DAILY PRN
Qty: 118 ML | Refills: 0 | Status: SHIPPED | OUTPATIENT
Start: 2023-12-28 | End: 2024-01-07

## 2023-12-28 RX ORDER — AZITHROMYCIN 250 MG/1
TABLET, FILM COATED ORAL
Qty: 6 TABLET | Refills: 0 | Status: SHIPPED | OUTPATIENT
Start: 2023-12-28 | End: 2024-01-02

## 2024-01-02 ENCOUNTER — TELEPHONE (OUTPATIENT)
Dept: PRIMARY CARE CLINIC | Facility: CLINIC | Age: 59
End: 2024-01-02
Payer: COMMERCIAL

## 2024-01-02 NOTE — TELEPHONE ENCOUNTER
----- Message from Angelika Balderas sent at 1/2/2024  9:52 AM CST -----  Contact: Alcira [phone]  Would like to receive medical advice.    Would they like a call back or a response via [facility]:  call back    Additional information:  Alcira is calling to say the Rxs guaiFENesin-codeine 100-10 mg/5 ml (TUSSI-ORGANIDIN NR)  mg/5 mL syrup && oseltamivir (TAMIFLU) 75 MG capsule have not help and she is still coughing non-stop. Janiesaeid would like to know if the provider can recommend something else for her. Please call Alcira back for advice.       [id number] - [place] [address] AT [facility] & [address] [address]  Phone: [phone] Fax: [phone]

## 2024-01-06 ENCOUNTER — PATIENT MESSAGE (OUTPATIENT)
Dept: PRIMARY CARE CLINIC | Facility: CLINIC | Age: 59
End: 2024-01-06
Payer: COMMERCIAL

## 2024-01-06 DIAGNOSIS — J02.8 PHARYNGITIS DUE TO OTHER ORGANISM: Primary | ICD-10-CM

## 2024-01-22 DIAGNOSIS — G44.229 CHRONIC TENSION-TYPE HEADACHE, NOT INTRACTABLE: ICD-10-CM

## 2024-01-22 RX ORDER — BROMPHENIRAMINE MALEATE, PSEUDOEPHEDRINE HYDROCHLORIDE, AND DEXTROMETHORPHAN HYDROBROMIDE 2; 30; 10 MG/5ML; MG/5ML; MG/5ML
SYRUP ORAL
Qty: 118 ML | Refills: 0 | Status: SHIPPED | OUTPATIENT
Start: 2024-01-22 | End: 2024-05-16 | Stop reason: SDUPTHER

## 2024-01-22 RX ORDER — BUTALBITAL, ACETAMINOPHEN AND CAFFEINE 50; 325; 40 MG/1; MG/1; MG/1
1 TABLET ORAL EVERY 6 HOURS PRN
Qty: 60 TABLET | Refills: 0 | Status: SHIPPED | OUTPATIENT
Start: 2024-01-22 | End: 2024-02-18 | Stop reason: SDUPTHER

## 2024-01-22 NOTE — TELEPHONE ENCOUNTER
No care due was identified.  Health [facility] Embedded Care Due Messages. Reference number: [id number].   1/22/2024 12:27:38 PM CST

## 2024-01-22 NOTE — TELEPHONE ENCOUNTER
No care due was identified.  Health [facility] Embedded Care Due Messages. Reference number: [id number].   1/22/2024 12:29:37 PM CST

## 2024-02-18 DIAGNOSIS — G44.229 CHRONIC TENSION-TYPE HEADACHE, NOT INTRACTABLE: ICD-10-CM

## 2024-02-19 RX ORDER — BUTALBITAL, ACETAMINOPHEN AND CAFFEINE 50; 325; 40 MG/1; MG/1; MG/1
1 TABLET ORAL EVERY 6 HOURS PRN
Qty: 60 TABLET | Refills: 0 | Status: SHIPPED | OUTPATIENT
Start: 2024-02-19 | End: 2024-03-21 | Stop reason: SDUPTHER

## 2024-02-19 NOTE — TELEPHONE ENCOUNTER
No care due was identified.  [facility] Embedded Care Due Messages. Reference number: [id number].   2/18/2024 8:04:29 PM CST

## 2024-03-21 DIAGNOSIS — G44.229 CHRONIC TENSION-TYPE HEADACHE, NOT INTRACTABLE: ICD-10-CM

## 2024-03-22 RX ORDER — BUTALBITAL, ACETAMINOPHEN AND CAFFEINE 50; 325; 40 MG/1; MG/1; MG/1
1 TABLET ORAL EVERY 6 HOURS PRN
Qty: 60 TABLET | Refills: 0 | Status: SHIPPED | OUTPATIENT
Start: 2024-03-22 | End: 2024-04-23 | Stop reason: SDUPTHER

## 2024-03-22 NOTE — TELEPHONE ENCOUNTER
No care due was identified.  Health [facility] Embedded Care Due Messages. Reference number: [id number].   3/21/2024 9:44:14 PM CDT

## 2024-04-23 DIAGNOSIS — G44.229 CHRONIC TENSION-TYPE HEADACHE, NOT INTRACTABLE: ICD-10-CM

## 2024-04-23 RX ORDER — BUTALBITAL, ACETAMINOPHEN AND CAFFEINE 50; 325; 40 MG/1; MG/1; MG/1
1 TABLET ORAL EVERY 6 HOURS PRN
Qty: 60 TABLET | Refills: 0 | Status: SHIPPED | OUTPATIENT
Start: 2024-04-23 | End: 2024-05-16 | Stop reason: SDUPTHER

## 2024-04-23 NOTE — TELEPHONE ENCOUNTER
No care due was identified.  Health [facility] Embedded Care Due Messages. Reference number: [id number].   4/23/2024 12:08:51 PM CDT

## 2024-05-03 ENCOUNTER — TELEPHONE (OUTPATIENT)
Dept: PRIMARY CARE CLINIC | Facility: CLINIC | Age: 59
End: 2024-05-03
Payer: COMMERCIAL

## 2024-05-03 NOTE — TELEPHONE ENCOUNTER
Called and spoke with pt, she was going to be seen on 5/10 but ended up rescheduling to 6/10     Advised will need to wait until 6/10 to see Dr. HARMON

## 2024-05-03 NOTE — TELEPHONE ENCOUNTER
----- Message from Leonora Osullivan sent at 5/3/2024  1:46 PM CDT -----  Contact: [phone] Patient  Caller is requesting an earlier appointment then we can schedule.  Caller is requesting a message be sent to the provider.  If this is for urgent care symptoms, did you offer other providers at this location, providers at other locations, or [facility]? (yes, no, n/a):  No  If this is for the patients physical, did you offer to schedule next available and put on wait list, or to see NP or PA for their physical?  (yes, no, n/a):  yes, pt on waitlist  When is the next available appointment with their provider:  06/10/24  Reason for the appointment:  6 month follow up  Patient preference of timeframe to be scheduled:  05/16/24  Would the patient like a call back, or a response through their [facility] portal?:   call back   Comments:

## 2024-05-03 NOTE — TELEPHONE ENCOUNTER
----- Message from Adeola Bee sent at 5/3/2024  2:47 PM CDT -----  Contact: [phone]  Patient is returning a phone call.  Who left a message for the patient: Mildred Alex MA  Does patient know what this is regarding:  yes   Would you like a call back, or a response through your [facility] portal?:   call back   Comments:

## 2024-05-16 ENCOUNTER — OFFICE VISIT (OUTPATIENT)
Dept: PRIMARY CARE CLINIC | Facility: CLINIC | Age: 59
End: 2024-05-16
Payer: COMMERCIAL

## 2024-05-16 VITALS
DIASTOLIC BLOOD PRESSURE: 80 MMHG | WEIGHT: 237.88 LBS | HEIGHT: 68 IN | TEMPERATURE: 98 F | RESPIRATION RATE: 18 BRPM | BODY MASS INDEX: 36.05 KG/M2 | HEART RATE: 80 BPM | OXYGEN SATURATION: 97 % | SYSTOLIC BLOOD PRESSURE: 124 MMHG

## 2024-05-16 DIAGNOSIS — G44.229 CHRONIC TENSION-TYPE HEADACHE, NOT INTRACTABLE: ICD-10-CM

## 2024-05-16 DIAGNOSIS — J06.9 URI WITH COUGH AND CONGESTION: ICD-10-CM

## 2024-05-16 DIAGNOSIS — I80.9 THROMBOPHLEBITIS: Primary | ICD-10-CM

## 2024-05-16 PROCEDURE — 99214 OFFICE O/P EST MOD 30 MIN: CPT | Mod: S$GLB,,, | Performed by: INTERNAL MEDICINE

## 2024-05-16 PROCEDURE — 1159F MED LIST DOCD IN RCRD: CPT | Mod: CPTII,S$GLB,, | Performed by: INTERNAL MEDICINE

## 2024-05-16 PROCEDURE — 3074F SYST BP LT 130 MM HG: CPT | Mod: CPTII,S$GLB,, | Performed by: INTERNAL MEDICINE

## 2024-05-16 PROCEDURE — 3008F BODY MASS INDEX DOCD: CPT | Mod: CPTII,S$GLB,, | Performed by: INTERNAL MEDICINE

## 2024-05-16 PROCEDURE — 1160F RVW MEDS BY RX/DR IN RCRD: CPT | Mod: CPTII,S$GLB,, | Performed by: INTERNAL MEDICINE

## 2024-05-16 PROCEDURE — 3079F DIAST BP 80-89 MM HG: CPT | Mod: CPTII,S$GLB,, | Performed by: INTERNAL MEDICINE

## 2024-05-16 PROCEDURE — 99999 PR PBB SHADOW E&M-EST. PATIENT-LVL IV: CPT | Mod: PBBFAC,,, | Performed by: INTERNAL MEDICINE

## 2024-05-16 RX ORDER — BROMPHENIRAMINE MALEATE, PSEUDOEPHEDRINE HYDROCHLORIDE, AND DEXTROMETHORPHAN HYDROBROMIDE 2; 30; 10 MG/5ML; MG/5ML; MG/5ML
SYRUP ORAL
Qty: 118 ML | Refills: 0 | Status: SHIPPED | OUTPATIENT
Start: 2024-05-16

## 2024-05-16 RX ORDER — BUTALBITAL, ACETAMINOPHEN AND CAFFEINE 50; 325; 40 MG/1; MG/1; MG/1
1 TABLET ORAL EVERY 6 HOURS PRN
Qty: 60 TABLET | Refills: 0 | Status: SHIPPED | OUTPATIENT
Start: 2024-05-16 | End: 2024-06-10 | Stop reason: SDUPTHER

## 2024-05-16 NOTE — PROGRESS NOTES
[facility] Note    Chief Complaint      Chief Complaint   Patient presents with    Cough    Leg Pain       History of Present Illness      Alcira Selby is a 58 y.o. female who presents today for   Chief Complaint   Patient presents with    Cough    Leg Pain   .  Patient comes to appointment here for acute visit related to above . She is delaing with lower ext thrombophlebitis . She is also compining of sinus pressure and cough     Problem List Items Addressed This Visit          Neuro    Chronic tension-type headache, not intractable    Overview     needs refill of fiorcet is workign well stable             ENT    URI with cough and congestion    Overview     Refill bromphed             Hematology    Thrombophlebitis - Primary    Overview     Nsaids , she has visit scheduled with vascular medicine               Past Medical History:  Past Medical History:   Diagnosis Date    Breast cancer     2004    Personal history of colonic polyps        Past Surgical History:  Past Surgical History:   Procedure Laterality Date    AXILLARY NODE DISSECTION Right 7/17/2019    Procedure: LYMPHADENECTOMY, AXILLARY RIGHT;  Surgeon: Darin Vaughan MD;  Location: [address];  Service: General;  Laterality: Right;    BREAST SURGERY      COLONOSCOPY N/A 12/12/2019    Procedure: COLONOSCOPY;  Surgeon: Estephanie Pratt MD;  Location: [facility] (4TH FLR);  Service: Endoscopy;  Laterality: N/A;    FOOT SURGERY Bilateral     Plantar Fascities    HYSTERECTOMY      INCISION AND DRAINAGE Right 8/13/2019    Procedure: Incision and Drainage - right chest wall;  Surgeon: Darin Vaughan MD;  Location: [address];  Service: General;  Laterality: Right;    KNEE SURGERY Left     Total Knee Replacement    MASTECTOMY      MASTECTOMY Right 7/17/2019    Procedure: MASTECTOMY RIGHT;  Surgeon: Darin Vaughan MD;  Location: [address];  Service: General;  Laterality: Right;    MYOMECTOMY       SENTINEL LYMPH NODE BIOPSY Right 7/17/2019    Procedure: BIOPSY, LYMPH NODE, SENTINEL RIGHT;  Surgeon: Darin Vaughan MD;  Location: [facility] OR [address] [place] [address];  Service: General;  Laterality: Right;    TONSILLECTOMY         Family History:  family history includes Cancer in her maternal aunt; Hypertension in her father and mother.    Social History:  Social History     Socioeconomic History    Marital status:    Tobacco Use    Smoking status: Never    Smokeless tobacco: Never   Substance and Sexual Activity    Alcohol use: No    Drug use: No     Social Determinants of Health     Financial Resource Strain: High Risk (10/5/2020)    Overall Financial Resource Strain (CARDIA)     Difficulty of Paying Living Expenses: Very hard   Food Insecurity: Food Insecurity Present (10/5/2020)    Hunger Vital Sign     Worried About Running Out of Food in the Last Year: Sometimes true     Ran Out of Food in the Last Year: Sometimes true   Transportation Needs: No Transportation Needs (10/5/2020)    PRAPARE - Transportation     Lack of Transportation (Medical): No     Lack of Transportation (Non-Medical): No   Physical Activity: Unknown (10/5/2020)    Exercise Vital Sign     Days of Exercise per Week: 7 days   Stress: No Stress Concern Present (10/5/2020)    [employer] - Occupational Stress Questionnaire     Feeling of Stress : Not at all       Review of Systems:   Review of Systems   Constitutional:  Negative for fever and weight loss.   HENT:  Positive for congestion. Negative for hearing loss and sore throat.    Eyes:  Negative for blurred vision.   Respiratory:  Positive for cough. Negative for shortness of breath.    Cardiovascular:  Negative for chest pain, palpitations, claudication and leg swelling.   Gastrointestinal:  Negative for abdominal pain, constipation, diarrhea and heartburn.   Genitourinary:  Negative for dysuria.   Musculoskeletal:  Negative for back pain and myalgias.   Skin:   Negative for rash.   Neurological:  Positive for headaches. Negative for focal weakness.   Psychiatric/Behavioral:  Negative for depression, memory loss and suicidal ideas. The patient is not nervous/anxious.          Medications:  Outpatient Encounter Medications as of 5/16/2024   Medication Sig Note Dispense Refill    (Magic mouthwash) 1:1:1 diphenhydrAMINE(Benadryl) 12.5mg/5ml liq, aluminum & magnesium hydroxide-simethicone (Maalox), LIDOcaine viscous 2% Swish and spit 10 mLs every 4 (four) hours as needed (severe sore throat). for mouth sores  400 mL 0    cyanocobalamin (VITAMIN B-12) 100 MCG tablet Take by mouth. 7/16/2019: Hold am of procedure       docusate sodium (COLACE) 100 MG capsule Take 100 mg by mouth 2 (two) times daily. 7/16/2019: Hold  am of procedure       fluticasone propionate (FLONASE ALLERGY RELIEF NASL) 50 mcg by Nasal route.       fluticasone propionate (FLONASE) 50 mcg/actuation nasal spray INHALE 1 SPRAY IN EACH NOSTRIL EVERY DAY.  48 g 3    gabapentin (NEURONTIN) 300 MG capsule TAKE 1 CAPSULE(300 MG) BY MOUTH EVERY EVENING  30 capsule 5    multivit-min/iron/folic/lutein (MULTIVITAMIN WOMEN 50 PLUS ORAL)        ondansetron (ZOFRAN) 4 MG tablet Take 1 tablet (4 mg total) by mouth every 8 (eight) hours as needed for Nausea.  10 tablet 3    temazepam (RESTORIL) 15 mg Cap Take 1 capsule (15 mg total) by mouth nightly as needed.  30 capsule 0    triamcinolone acetonide 0.1% (KENALOG) 0.1 % cream APPLY AA BID PRN   0    vitamin B complex (SUPER B COMPLEX-B-12 ORAL) Take by mouth. 7/16/2019: Hold am of procedure       vitamin B complex (SUPER B COMPLEX-B-12 ORAL) Take by mouth.       [DISCONTINUED] brompheniramine-pseudoeph-DM (BROMFED DM) 2-30-10 mg/5 mL Syrp TAKE 5 ML BY MOUTH FOUR TIMES DAILY AS NEEDED  118 mL 0    [DISCONTINUED] butalbital-acetaminophen-caffeine -40 mg (FIORICET, ESGIC) -40 mg per tablet Take 1 tablet by mouth every 6 (six) hours as needed for Headaches.  60 tablet  "0    brompheniramine-pseudoeph-DM (BROMFED DM) 2-30-10 mg/5 mL Syrp TAKE 5 ML BY MOUTH FOUR TIMES DAILY AS NEEDED  118 mL 0    butalbital-acetaminophen-caffeine -40 mg (FIORICET, ESGIC) -40 mg per tablet Take 1 tablet by mouth every 6 (six) hours as needed for Headaches.  60 tablet 0    [DISCONTINUED] scopolamine (TRANSDERM-SCOP) 1.3-1.5 mg (1 mg over 3 days) Place 1 patch onto the skin Every 3 (three) days. (Patient not taking: Reported on 5/16/2024)  4 patch 0     No facility-administered encounter medications on file as of 5/16/2024.        Allergies:  Review of patient's allergies indicates:   Allergen Reactions    Sulfamethoxazole-trimethoprim Hives and Itching         Physical Exam         Vitals:    05/16/24 0850   BP: 124/80   Pulse: 80   Resp: 18   Temp: 98 °F (36.7 °C)         Physical Exam  Constitutional:       Appearance: She is well-developed.   Eyes:      Pupils: Pupils are equal, round, and reactive to light.   Neck:      Thyroid: No thyromegaly.   Cardiovascular:      Rate and Rhythm: Normal rate.      Heart sounds: Normal heart sounds. No murmur heard.     No friction rub. No gallop.   Pulmonary:      Breath sounds: Normal breath sounds.   Abdominal:      General: Bowel sounds are normal.      Palpations: Abdomen is soft.   Musculoskeletal:         General: Normal range of motion.      Cervical back: Normal range of motion.   Lymphadenopathy:      Cervical: No cervical adenopathy.   Skin:     General: Skin is warm.      Findings: No rash.   Neurological:      Mental Status: She is alert and oriented to person, place, and time.      Cranial Nerves: No cranial nerve deficit.   Psychiatric:         Behavior: Behavior normal.          Laboratory:  CBC:  No results for input(s): "WBC", "RBC", "HGB", "HCT", "PLT", "MCV", "MCH", "MCHC" in the last 2160 hours.  CMP:  No results for input(s): "GLU", "CALCIUM", "ALBUMIN", "PROT", "NA", "K", "CO2", "CL", "BUN", "ALKPHOS", "ALT", "AST", "BILITOT" " "in the last 2160 hours.    Invalid input(s): "CREATININ"  URINALYSIS:  No results for input(s): "COLORU", "CLARITYU", "SPECGRAV", "PHUR", "PROTEINUA", "GLUCOSEU", "BILIRUBINCON", "BLOODU", "WBCU", "RBCU", "BACTERIA", "MUCUS", "NITRITE", "LEUKOCYTESUR", "UROBILINOGEN", "HYALINECASTS" in the last 2160 hours.   LIPIDS:  No results for input(s): "TSH", "HDL", "CHOL", "TRIG", "LDLCALC", "CHOLHDL", "NONHDLCHOL", "TOTALCHOLEST" in the last 2160 hours.  TSH:  No results for input(s): "TSH" in the last 2160 hours.  A1C:  No results for input(s): "HGBA1C" in the last 2160 hours.    Radiology:        Assessment:     Alcira Selby is a 58 y.o.female with:    Thrombophlebitis    URI with cough and congestion    Chronic tension-type headache, not intractable  -     butalbital-acetaminophen-caffeine -40 mg (FIORICET, ESGIC) -40 mg per tablet; Take 1 tablet by mouth every 6 (six) hours as needed for Headaches.  Dispense: 60 tablet; Refill: 0    Other orders  -     brompheniramine-pseudoeph-DM (BROMFED DM) 2-30-10 mg/5 mL Syrp; TAKE 5 ML BY MOUTH FOUR TIMES DAILY AS NEEDED  Dispense: 118 mL; Refill: 0          Plan:     Problem List Items Addressed This Visit          Neuro    Chronic tension-type headache, not intractable    Overview     needs refill of fiorcet is workign well stable             ENT    URI with cough and congestion    Overview     Refill bromphed             Hematology    Thrombophlebitis - Primary    Overview     Nsaids , she has visit scheduled with vascular medicine             As above, continue current medications and maintain follow up with specialists.  Return to clinic in 6 months.      Frederick W Dantagnan [facility]                  "

## 2024-05-20 ENCOUNTER — TELEPHONE (OUTPATIENT)
Dept: PRIMARY CARE CLINIC | Facility: CLINIC | Age: 59
End: 2024-05-20
Payer: COMMERCIAL

## 2024-05-20 DIAGNOSIS — J01.10 ACUTE NON-RECURRENT FRONTAL SINUSITIS: Primary | ICD-10-CM

## 2024-05-20 RX ORDER — AMOXICILLIN 500 MG/1
500 TABLET, FILM COATED ORAL EVERY 12 HOURS
Qty: 20 TABLET | Refills: 0 | Status: SHIPPED | OUTPATIENT
Start: 2024-05-20 | End: 2024-05-30

## 2024-05-20 NOTE — TELEPHONE ENCOUNTER
----- Message from Michael Padron sent at 5/20/2024  1:23 PM CDT -----  Contact: [phone]@[email or web address]  Good afternoon patient would like to request that the doc call her in some antibiotics patient says the med the doc gave her on 5/16 for her  visit is not helping. Please call patient to advis [phone]

## 2024-05-26 NOTE — OP NOTE
DATE OF PROCEDURE:  07/17/2019    PRIMARY SURGEON:  Darin Vaughan M.D.    ASSISTANT SURGEON:  Adalid Chamberlain M.D. (RES), Surgery resident.    COMPLICATIONS:  None.    ESTIMATED BLOOD LOSS:  Minimal.    PROCEDURES PERFORMED:  Right total complete therapeutic mastectomy with   injection of right breast with both technetium-labeled radiocolloid and   isosulfan Lymphazurin blue dye for sentinel lymph node mapping and   identification; right deep axillary excisional sentinel lymph node biopsy x1   after identification of a single hot blue deep right axillary sentinel lymph   node.    PREOPERATIVE DIAGNOSIS:  Ductal carcinoma in situ of the right breast with a   prior history of breast conservation surgery and radiotherapy to the right   breast in the remote past.    POSTOPERATIVE DIAGNOSIS:  Ductal carcinoma in situ of the right breast with a   prior history of breast conservation surgery and radiotherapy to the right   breast in the remote past.    ANESTHESIA:  General with regional paravertebral block on the right.    PROCEDURE IN DETAIL:  The patient underwent informed consent.  The history and   physical examination was reviewed and updated.  The DCIS was in the 12 o'clock   position of the right breast.  The patient underwent the regional paravertebral   block on the right side and was brought back to the Operating Room where she   underwent a general anesthetic.  She was in a supine position.  The right   breast, right anterior chest, right arm and axilla were prepped and draped in a   sterile fashion after the isosulfan Lymphazurin blue dye and technetium-labeled   radiocolloid were injected in the right anterior subareolar region of the right   breast.  This was allowed to migrate to the right axilla while the right breast   was prepped and draped in a sterile fashion.  Initially, there was minimal   activity in the right axilla, likely secondary to the prior history of breast   conservation surgery and  radiotherapy in the remote past.  We therefore   performed the initial mastectomy flap on the inferior aspect.  We marked a   standard transverse elliptical mastectomy taking the entire nipple areolar   complex as well as more skin superiorly at the 12 o'clock region given the fact   that the DCIS was in the superior aspect of the right breast.  The inferior flap   incision was made.  The inferior flap was carried down to 1 cm below the   inframammary fold, medially to the midline to the lateral border of the sternum   and laterally to identify the anterior border of latissimus dorsi muscle and the   serratus anterior muscle.  Skin flaps were uniform in thickness, approximately   6 to 7 mm, leaving only skin and subcutaneous tissue.  At this point, we did   note a hot spot in the axilla.  We made the superior mastectomy incision and   performed the upper outer quadrant dissection up and over the upper outer   quadrant axillary tail of Hernandez breast tissue to the level of the clavipectoral   fascia where we could see a blue afferent lymphatic channel leading to a hot   blue solitary right axillary sentinel lymph node.  This lymph node was excised   from the level 1 region, just deep to the clavipectoral fascia.  It was hot and   blue with an ex vivo count of approximately 1000.  Once the lymph node was   removed, the background residual radioactivity counts were essentially absent   and negligible indicating adequate and appropriate sentinel lymph node   identification and mapping for axillary staging.  No further lymph nodes were   palpable and no other blue nodes were noted.  The frozen section on the solitary   node was negative.  Therefore, we did not take any additional lymphatic tissue.    The superior mastectomy flap was completed cephalad to the clavicle, medially   to the midline to the lateral border of the sternum, preserving the intercostal   perforators off of the internal mammary vessels.  The breast  was then removed   from a medial to lateral direction taking the breast to the level of the   clavipectoral fascia to the point where the sentinel lymph node had been taken.    The breast was oriented with a short stitch superiorly and a long stitch   laterally and submitted to Pathology for permanent sectioning.  Skin flaps again   were inspected.  They were uniform in thickness, approximately 6 to 7 mm,   leaving only skin and subcutaneous tissue having performed the entire dissection   outside of the superficial investing fascial layer of the breast.  Two #19   round Joe drains were brought in through separate inferior stab incisions.    They were anchored to the skin at the exit site with 2-0 nylon suture.  The skin   flaps were made hemostatic.  The deep dermal and subcutaneous layers were   reapproximated with Vicryl sutures.  The skin was closed with a running 4-0   Monocryl subcuticular skin closure.  Sterile skin Dermabond was applied followed   by sterile fluff gauze dressing and post-procedure bra.  She was turned over to   Anesthesia for extubation and transferred to the Recovery Area in a   satisfactory condition.      JOSSELYN/IN  dd: 07/17/2019 13:47:41 (CDT)  td: 07/17/2019 14:52:04 (CDT)  Doc ID   #[id number]  Job ID #[id number]    CC:    Ears: no ear pain and no hearing problems. Nose: no nasal congestion and no nasal drainage. Mouth/Throat: no dysphagia, no hoarseness and no throat pain. Neck: no lumps, no pain, no stiffness and no swollen glands.

## 2024-06-10 ENCOUNTER — OFFICE VISIT (OUTPATIENT)
Dept: PRIMARY CARE CLINIC | Facility: CLINIC | Age: 59
End: 2024-06-10
Payer: COMMERCIAL

## 2024-06-10 VITALS
SYSTOLIC BLOOD PRESSURE: 128 MMHG | HEIGHT: 68 IN | HEART RATE: 80 BPM | BODY MASS INDEX: 36.29 KG/M2 | WEIGHT: 239.44 LBS | DIASTOLIC BLOOD PRESSURE: 76 MMHG

## 2024-06-10 DIAGNOSIS — E78.2 MIXED HYPERLIPIDEMIA: Primary | ICD-10-CM

## 2024-06-10 DIAGNOSIS — D17.21 LIPOMA OF RIGHT UPPER EXTREMITY: ICD-10-CM

## 2024-06-10 DIAGNOSIS — G47.00 INSOMNIA, UNSPECIFIED TYPE: ICD-10-CM

## 2024-06-10 DIAGNOSIS — G44.229 CHRONIC TENSION-TYPE HEADACHE, NOT INTRACTABLE: ICD-10-CM

## 2024-06-10 DIAGNOSIS — D05.11 DUCTAL CARCINOMA IN SITU (DCIS) OF RIGHT BREAST: ICD-10-CM

## 2024-06-10 DIAGNOSIS — N32.81 OAB (OVERACTIVE BLADDER): ICD-10-CM

## 2024-06-10 DIAGNOSIS — E66.01 SEVERE OBESITY (BMI 35.0-39.9) WITH COMORBIDITY: ICD-10-CM

## 2024-06-10 DIAGNOSIS — E04.9 THYROID GOITER: ICD-10-CM

## 2024-06-10 PROCEDURE — 99999 PR PBB SHADOW E&M-EST. PATIENT-LVL IV: CPT | Mod: PBBFAC,,, | Performed by: INTERNAL MEDICINE

## 2024-06-10 PROCEDURE — 3078F DIAST BP <80 MM HG: CPT | Mod: CPTII,S$GLB,, | Performed by: INTERNAL MEDICINE

## 2024-06-10 PROCEDURE — 1159F MED LIST DOCD IN RCRD: CPT | Mod: CPTII,S$GLB,, | Performed by: INTERNAL MEDICINE

## 2024-06-10 PROCEDURE — 99214 OFFICE O/P EST MOD 30 MIN: CPT | Mod: S$GLB,,, | Performed by: INTERNAL MEDICINE

## 2024-06-10 PROCEDURE — 3008F BODY MASS INDEX DOCD: CPT | Mod: CPTII,S$GLB,, | Performed by: INTERNAL MEDICINE

## 2024-06-10 PROCEDURE — 3074F SYST BP LT 130 MM HG: CPT | Mod: CPTII,S$GLB,, | Performed by: INTERNAL MEDICINE

## 2024-06-10 RX ORDER — BUTALBITAL, ACETAMINOPHEN AND CAFFEINE 50; 325; 40 MG/1; MG/1; MG/1
1 TABLET ORAL EVERY 6 HOURS PRN
Qty: 60 TABLET | Refills: 0 | Status: SHIPPED | OUTPATIENT
Start: 2024-06-10

## 2024-06-10 NOTE — PROGRESS NOTES
[facility] Note    Chief Complaint      Chief Complaint   Patient presents with    Follow-up     6 mth       History of Present Illness      Alcira Selby is a 58 y.o. female who presents today for   Chief Complaint   Patient presents with    Follow-up     6 mth   .  Patient comes to appointment here for 6 m checkup for chroni c issues as below . Sh needs full screening labs with this visit . She is seeing ortho currently dr Yobany Pimentel . Wuill be getting surgery for epicondylitis with him next month.    Problem List Items Addressed This Visit          Cardiac/Vascular    Mixed hyperlipidemia - Primary    Overview     Diet only repeat cmp and lipid this visit             Renal/    OAB (overactive bladder)    Overview     No meds cont kegel             Oncology    Ductal carcinoma in situ (DCIS) of right breast    Overview     Is seeing dr lee  surgeon for initial surgery and surveillance  will be seeing dr mendez for breast reconstruction. Also is seeing dr wilkerson.   Awaiting dr mendez and insurance coverage for reconstructive procedure . Has had reconstructive surgery in June .is now seeing dr wilkerson for routine surveillance     2/24/21 stable as above   08/25/2021 stable   4/24/23 dr wilkerson managing surveillance   6/10 /24 stable          Lipoma of right upper extremity    Overview     Gen surg rerferral             Endocrine    Thyroid goiter    Overview     Repeat tsh          Severe obesity (BMI 35.0-39.9) with comorbidity    Overview     counciled on diet and exercise             Other    Insomnia    Overview      Temazepamas needed               Past Medical History:  Past Medical History:   Diagnosis Date    Breast cancer     2004    Personal history of colonic polyps        Past Surgical History:  Past Surgical History:   Procedure Laterality Date    AXILLARY NODE DISSECTION Right 7/17/2019    Procedure: LYMPHADENECTOMY, AXILLARY RIGHT;  Surgeon: Darin MELENDREZ  MD Alexus;  Location: [facility] OR 2ND FLR;  Service: General;  Laterality: Right;    BREAST SURGERY      COLONOSCOPY N/A 12/12/2019    Procedure: COLONOSCOPY;  Surgeon: Estephanie Pratt MD;  Location: [facility] ENDO (4TH FLR);  Service: Endoscopy;  Laterality: N/A;    FOOT SURGERY Bilateral     Plantar Fascities    HYSTERECTOMY      INCISION AND DRAINAGE Right 8/13/2019    Procedure: Incision and Drainage - right chest wall;  Surgeon: Darin Vaughan MD;  Location: [facility] OR 2ND FLR;  Service: General;  Laterality: Right;    KNEE SURGERY Left     Total Knee Replacement    MASTECTOMY      MASTECTOMY Right 7/17/2019    Procedure: MASTECTOMY RIGHT;  Surgeon: Darin Vaughan MD;  Location: [facility] OR 2ND FLR;  Service: General;  Laterality: Right;    MYOMECTOMY      SENTINEL LYMPH NODE BIOPSY Right 7/17/2019    Procedure: BIOPSY, LYMPH NODE, SENTINEL RIGHT;  Surgeon: Darin Vaughan MD;  Location: [facility] OR 2ND FLR;  Service: General;  Laterality: Right;    TONSILLECTOMY         Family History:  family history includes Cancer in her maternal aunt; Hypertension in her father and mother.    Social History:  Social History     Socioeconomic History    Marital status:    Tobacco Use    Smoking status: Never    Smokeless tobacco: Never   Substance and Sexual Activity    Alcohol use: No    Drug use: No     Social Determinants of Health     Financial Resource Strain: High Risk (10/5/2020)    Overall Financial Resource Strain (CARDIA)     Difficulty of Paying Living Expenses: Very hard   Food Insecurity: Food Insecurity Present (10/5/2020)    Hunger Vital Sign     Worried About Running Out of Food in the Last Year: Sometimes true     Ran Out of Food in the Last Year: Sometimes true   Transportation Needs: No Transportation Needs (10/5/2020)    PRAPARE - Transportation     Lack of Transportation (Medical): No     Lack of Transportation (Non-Medical): No   Physical Activity: Unknown (10/5/2020)    Exercise Vital Sign      Days of Exercise per Week: 7 days   Stress: No Stress Concern Present (10/5/2020)    [employer] - Occupational Stress Questionnaire     Feeling of Stress : Not at all       Review of Systems:   Review of Systems   Constitutional:  Negative for fever and weight loss.   HENT:  Negative for congestion, hearing loss and sore throat.    Eyes:  Negative for blurred vision.   Respiratory:  Negative for cough and shortness of breath.    Cardiovascular:  Negative for chest pain, palpitations, claudication and leg swelling.   Gastrointestinal:  Negative for abdominal pain, constipation, diarrhea and heartburn.   Genitourinary:  Negative for dysuria.   Musculoskeletal:  Positive for joint pain. Negative for back pain and myalgias.   Skin:  Negative for rash.   Neurological:  Negative for focal weakness and headaches.   Psychiatric/Behavioral:  Negative for depression and suicidal ideas. The patient is not nervous/anxious.          Medications:  Outpatient Encounter Medications as of 6/10/2024   Medication Sig Note Dispense Refill    (Magic mouthwash) 1:1:1 diphenhydrAMINE(Benadryl) 12.5mg/5ml liq, aluminum & magnesium hydroxide-simethicone (Maalox), LIDOcaine viscous 2% Swish and spit 10 mLs every 4 (four) hours as needed (severe sore throat). for mouth sores  400 mL 0    brompheniramine-pseudoeph-DM (BROMFED DM) 2-30-10 mg/5 mL Syrp TAKE 5 ML BY MOUTH FOUR TIMES DAILY AS NEEDED  118 mL 0    butalbital-acetaminophen-caffeine -40 mg (FIORICET, ESGIC) -40 mg per tablet Take 1 tablet by mouth every 6 (six) hours as needed for Headaches.  60 tablet 0    cyanocobalamin (VITAMIN B-12) 100 MCG tablet Take by mouth. 7/16/2019: Hold am of procedure       docusate sodium (COLACE) 100 MG capsule Take 100 mg by mouth 2 (two) times daily. 7/16/2019: Hold  am of procedure       fluticasone propionate (FLONASE) 50 mcg/actuation nasal spray INHALE 1 SPRAY IN EACH NOSTRIL EVERY DAY.  48 g 3     gabapentin (NEURONTIN) 300 MG capsule TAKE 1 CAPSULE(300 MG) BY MOUTH EVERY EVENING  30 capsule 5    multivit-min/iron/folic/lutein (MULTIVITAMIN WOMEN 50 PLUS ORAL)        ondansetron (ZOFRAN) 4 MG tablet Take 1 tablet (4 mg total) by mouth every 8 (eight) hours as needed for Nausea.  10 tablet 3    temazepam (RESTORIL) 15 mg Cap Take 1 capsule (15 mg total) by mouth nightly as needed.  30 capsule 0    triamcinolone acetonide 0.1% (KENALOG) 0.1 % cream APPLY AA BID PRN   0    vitamin B complex (SUPER B COMPLEX-B-12 ORAL) Take by mouth. 2019: Hold am of procedure       [] amoxicillin (AMOXIL) 500 MG Tab Take 1 tablet (500 mg total) by mouth every 12 (twelve) hours. for 10 days  20 tablet 0    [DISCONTINUED] brompheniramine-pseudoeph-DM (BROMFED DM) 2-30-10 mg/5 mL Syrp TAKE 5 ML BY MOUTH FOUR TIMES DAILY AS NEEDED  118 mL 0    [DISCONTINUED] butalbital-acetaminophen-caffeine -40 mg (FIORICET, ESGIC) -40 mg per tablet Take 1 tablet by mouth every 6 (six) hours as needed for Headaches.  60 tablet 0    [DISCONTINUED] fluticasone propionate (FLONASE ALLERGY RELIEF NASL) 50 mcg by Nasal route.       [DISCONTINUED] scopolamine (TRANSDERM-SCOP) 1.3-1.5 mg (1 mg over 3 days) Place 1 patch onto the skin Every 3 (three) days. (Patient not taking: Reported on 2024)  4 patch 0    [DISCONTINUED] vitamin B complex (SUPER B COMPLEX-B-12 ORAL) Take by mouth.        No facility-administered encounter medications on file as of 6/10/2024.        Allergies:  Review of patient's allergies indicates:   Allergen Reactions    Sulfamethoxazole-trimethoprim Hives and Itching         Physical Exam         Vitals:    06/10/24 1339   BP: 128/76   Pulse: 80         Physical Exam  Constitutional:       Appearance: She is well-developed.   Eyes:      Pupils: Pupils are equal, round, and reactive to light.   Neck:      Thyroid: No thyromegaly.   Cardiovascular:      Rate and Rhythm: Normal rate.      Heart sounds:  "Normal heart sounds. No murmur heard.     No friction rub. No gallop.   Pulmonary:      Breath sounds: Normal breath sounds.   Abdominal:      General: Bowel sounds are normal.      Palpations: Abdomen is soft.   Musculoskeletal:         General: Normal range of motion.      Cervical back: Normal range of motion.   Lymphadenopathy:      Cervical: No cervical adenopathy.   Skin:     General: Skin is warm.      Findings: No rash.      Comments: lipoma   Neurological:      Mental Status: She is alert and oriented to person, place, and time.      Cranial Nerves: No cranial nerve deficit.   Psychiatric:         Behavior: Behavior normal.          Laboratory:  CBC:  No results for input(s): "WBC", "RBC", "HGB", "HCT", "PLT", "MCV", "MCH", "MCHC" in the last 2160 hours.  CMP:  No results for input(s): "GLU", "CALCIUM", "ALBUMIN", "PROT", "NA", "K", "CO2", "CL", "BUN", "ALKPHOS", "ALT", "AST", "BILITOT" in the last 2160 hours.    Invalid input(s): "CREATININ"  URINALYSIS:  No results for input(s): "COLORU", "CLARITYU", "SPECGRAV", "PHUR", "PROTEINUA", "GLUCOSEU", "BILIRUBINCON", "BLOODU", "WBCU", "RBCU", "BACTERIA", "MUCUS", "NITRITE", "LEUKOCYTESUR", "UROBILINOGEN", "HYALINECASTS" in the last 2160 hours.   LIPIDS:  No results for input(s): "TSH", "HDL", "CHOL", "TRIG", "LDLCALC", "CHOLHDL", "NONHDLCHOL", "TOTALCHOLEST" in the last 2160 hours.  TSH:  No results for input(s): "TSH" in the last 2160 hours.  A1C:  No results for input(s): "HGBA1C" in the last 2160 hours.    Radiology:        Assessment:     Alcira Selby is a 58 y.o.female with:    Mixed hyperlipidemia  -     Comprehensive Metabolic Panel; Future; Expected date: 06/10/2024  -     Lipid Panel; Future; Expected date: 06/10/2024    Lipoma of right upper extremity  -     Ambulatory referral/consult to General Surgery; Future; Expected date: 06/17/2024    Severe obesity (BMI 35.0-39.9) with comorbidity    Ductal carcinoma in situ (DCIS) of right " breast    Insomnia, unspecified type    OAB (overactive bladder)    Thyroid goiter  -     TSH; Future; Expected date: 06/10/2024          Plan:     Problem List Items Addressed This Visit          Cardiac/Vascular    Mixed hyperlipidemia - Primary    Overview     Diet only repeat cmp and lipid this visit             Renal/    OAB (overactive bladder)    Overview     No meds cont kegel             Oncology    Ductal carcinoma in situ (DCIS) of right breast    Overview     Is seeing dr lee  surgeon for initial surgery and surveillance  will be seeing dr mendez for breast reconstruction. Also is seeing dr wilkerson.   Awaiting dr mendez and insurance coverage for reconstructive procedure . Has had reconstructive surgery in June .is now seeing dr wilkerson for routine surveillance     2/24/21 stable as above   08/25/2021 stable   4/24/23 dr wilkerson managing surveillance   6/10 /24 stable          Lipoma of right upper extremity    Overview     Gen surg rerferral             Endocrine    Thyroid goiter    Overview     Repeat tsh          Severe obesity (BMI 35.0-39.9) with comorbidity    Overview     counciled on diet and exercise             Other    Insomnia    Overview      Temazepamas needed             As above, continue current medications and maintain follow up with specialists.  Return to clinic in 6 months.      Frederick W Dantagnan [facility]

## 2024-06-14 ENCOUNTER — OFFICE VISIT (OUTPATIENT)
Dept: SURGERY | Facility: CLINIC | Age: 59
End: 2024-06-14
Payer: COMMERCIAL

## 2024-06-14 VITALS
DIASTOLIC BLOOD PRESSURE: 83 MMHG | WEIGHT: 237.31 LBS | HEART RATE: 83 BPM | BODY MASS INDEX: 35.97 KG/M2 | SYSTOLIC BLOOD PRESSURE: 154 MMHG | OXYGEN SATURATION: 96 % | HEIGHT: 68 IN

## 2024-06-14 DIAGNOSIS — D17.21 LIPOMA OF RIGHT UPPER EXTREMITY: ICD-10-CM

## 2024-06-14 PROCEDURE — 1159F MED LIST DOCD IN RCRD: CPT | Mod: CPTII,S$GLB,, | Performed by: STUDENT IN AN ORGANIZED HEALTH CARE EDUCATION/TRAINING PROGRAM

## 2024-06-14 PROCEDURE — 3077F SYST BP >= 140 MM HG: CPT | Mod: CPTII,S$GLB,, | Performed by: STUDENT IN AN ORGANIZED HEALTH CARE EDUCATION/TRAINING PROGRAM

## 2024-06-14 PROCEDURE — 3008F BODY MASS INDEX DOCD: CPT | Mod: CPTII,S$GLB,, | Performed by: STUDENT IN AN ORGANIZED HEALTH CARE EDUCATION/TRAINING PROGRAM

## 2024-06-14 PROCEDURE — 3079F DIAST BP 80-89 MM HG: CPT | Mod: CPTII,S$GLB,, | Performed by: STUDENT IN AN ORGANIZED HEALTH CARE EDUCATION/TRAINING PROGRAM

## 2024-06-14 PROCEDURE — 99204 OFFICE O/P NEW MOD 45 MIN: CPT | Mod: S$GLB,,, | Performed by: STUDENT IN AN ORGANIZED HEALTH CARE EDUCATION/TRAINING PROGRAM

## 2024-06-14 PROCEDURE — 99999 PR PBB SHADOW E&M-EST. PATIENT-LVL IV: CPT | Mod: PBBFAC,,, | Performed by: STUDENT IN AN ORGANIZED HEALTH CARE EDUCATION/TRAINING PROGRAM

## 2024-06-14 NOTE — PROGRESS NOTES
Surgery Clinic Note - H and P    Subjective:     Alcira Selby is a 58 y.o. female with h/o breast cancer twice s/p lumpectomy and subsequent mastectomy with reconstruction who presents to clinic for evaluation of right arm subcutaneous masses. She has two areas likely representative of lipoma on right arm that have been there for several years. One of them causes some discomfort. She has not noticed any growth in either of these lesions. She reports that her son has several lipomas one of which he had removed here a few years ago. She denies taking any blood thinners.     PSH: bilateral knee replacements, tonsillectomy, lumpectomy and subsequently unilateral mastectomy with reconstruction.           PMH:   Past Medical History:   Diagnosis Date    Breast cancer     2004    Personal history of colonic polyps        Past Surgical History:   Past Surgical History:   Procedure Laterality Date    AXILLARY NODE DISSECTION Right 7/17/2019    Procedure: LYMPHADENECTOMY, AXILLARY RIGHT;  Surgeon: Darin Vaughan MD;  Location: [address];  Service: General;  Laterality: Right;    BREAST SURGERY      COLONOSCOPY N/A 12/12/2019    Procedure: COLONOSCOPY;  Surgeon: Estephaine Pratt MD;  Location: [facility] (4TH FLR);  Service: Endoscopy;  Laterality: N/A;    FOOT SURGERY Bilateral     Plantar Fascities    HYSTERECTOMY      INCISION AND DRAINAGE Right 8/13/2019    Procedure: Incision and Drainage - right chest wall;  Surgeon: Darin Vaughan MD;  Location: [address];  Service: General;  Laterality: Right;    KNEE SURGERY Left     Total Knee Replacement    MASTECTOMY      MASTECTOMY Right 7/17/2019    Procedure: MASTECTOMY RIGHT;  Surgeon: Darin Vaughan MD;  Location: [address];  Service: General;  Laterality: Right;    MYOMECTOMY      SENTINEL LYMPH NODE BIOPSY Right 7/17/2019    Procedure: BIOPSY, LYMPH NODE, SENTINEL RIGHT;  Surgeon: Darin Vaughan MD;  Location: [address];   Service: General;  Laterality: Right;    TONSILLECTOMY         Social History:  Social History     Socioeconomic History    Marital status:    Tobacco Use    Smoking status: Never    Smokeless tobacco: Never   Substance and Sexual Activity    Alcohol use: No    Drug use: No     Social Determinants of Health     Financial Resource Strain: High Risk (10/5/2020)    Overall Financial Resource Strain (CARDIA)     Difficulty of Paying Living Expenses: Very hard   Food Insecurity: Food Insecurity Present (10/5/2020)    Hunger Vital Sign     Worried About Running Out of Food in the Last Year: Sometimes true     Ran Out of Food in the Last Year: Sometimes true   Transportation Needs: No Transportation Needs (10/5/2020)    PRAPARE - Transportation     Lack of Transportation (Medical): No     Lack of Transportation (Non-Medical): No   Physical Activity: Unknown (10/5/2020)    Exercise Vital Sign     Days of Exercise per Week: 7 days   Stress: No Stress Concern Present (10/5/2020)    [employer] - Occupational Stress Questionnaire     Feeling of Stress : Not at all       Allergies:   Review of patient's allergies indicates:   Allergen Reactions    Sulfamethoxazole-trimethoprim Hives and Itching       Medications:  Current Outpatient Medications:     (Magic mouthwash) 1:1:1 diphenhydrAMINE(Benadryl) 12.5mg/5ml liq, aluminum & magnesium hydroxide-simethicone (Maalox), LIDOcaine viscous 2%, Swish and spit 10 mLs every 4 (four) hours as needed (severe sore throat). for mouth sores, Disp: 400 mL, Rfl: 0    brompheniramine-pseudoeph-DM (BROMFED DM) 2-30-10 mg/5 mL Syrp, TAKE 5 ML BY MOUTH FOUR TIMES DAILY AS NEEDED, Disp: 118 mL, Rfl: 0    butalbital-acetaminophen-caffeine -40 mg (FIORICET, ESGIC) -40 mg per tablet, Take 1 tablet by mouth every 6 (six) hours as needed for Headaches., Disp: 60 tablet, Rfl: 0    cyanocobalamin (VITAMIN B-12) 100 MCG tablet, Take by mouth., Disp: , Rfl:      docusate sodium (COLACE) 100 MG capsule, Take 100 mg by mouth 2 (two) times daily., Disp: , Rfl:     fluticasone propionate (FLONASE) 50 mcg/actuation nasal spray, INHALE 1 SPRAY IN EACH NOSTRIL EVERY DAY., Disp: 48 g, Rfl: 3    gabapentin (NEURONTIN) 300 MG capsule, TAKE 1 CAPSULE(300 MG) BY MOUTH EVERY EVENING, Disp: 30 capsule, Rfl: 5    multivit-min/iron/folic/lutein (MULTIVITAMIN WOMEN 50 PLUS ORAL), , Disp: , Rfl:     ondansetron (ZOFRAN) 4 MG tablet, Take 1 tablet (4 mg total) by mouth every 8 (eight) hours as needed for Nausea., Disp: 10 tablet, Rfl: 3    temazepam (RESTORIL) 15 mg Cap, Take 1 capsule (15 mg total) by mouth nightly as needed., Disp: 30 capsule, Rfl: 0    triamcinolone acetonide 0.1% (KENALOG) 0.1 % cream, APPLY AA BID PRN, Disp: , Rfl: 0    vitamin B complex (SUPER B COMPLEX-B-12 ORAL), Take by mouth., Disp: , Rfl:     Current Outpatient Medications on File Prior to Visit   Medication Sig Dispense Refill    (Magic mouthwash) 1:1:1 diphenhydrAMINE(Benadryl) 12.5mg/5ml liq, aluminum & magnesium hydroxide-simethicone (Maalox), LIDOcaine viscous 2% Swish and spit 10 mLs every 4 (four) hours as needed (severe sore throat). for mouth sores 400 mL 0    brompheniramine-pseudoeph-DM (BROMFED DM) 2-30-10 mg/5 mL Syrp TAKE 5 ML BY MOUTH FOUR TIMES DAILY AS NEEDED 118 mL 0    butalbital-acetaminophen-caffeine -40 mg (FIORICET, ESGIC) -40 mg per tablet Take 1 tablet by mouth every 6 (six) hours as needed for Headaches. 60 tablet 0    cyanocobalamin (VITAMIN B-12) 100 MCG tablet Take by mouth.      docusate sodium (COLACE) 100 MG capsule Take 100 mg by mouth 2 (two) times daily.      fluticasone propionate (FLONASE) 50 mcg/actuation nasal spray INHALE 1 SPRAY IN EACH NOSTRIL EVERY DAY. 48 g 3    gabapentin (NEURONTIN) 300 MG capsule TAKE 1 CAPSULE(300 MG) BY MOUTH EVERY EVENING 30 capsule 5    multivit-min/iron/folic/lutein (MULTIVITAMIN WOMEN 50 PLUS ORAL)       ondansetron (ZOFRAN) 4 MG  tablet Take 1 tablet (4 mg total) by mouth every 8 (eight) hours as needed for Nausea. 10 tablet 3    temazepam (RESTORIL) 15 mg Cap Take 1 capsule (15 mg total) by mouth nightly as needed. 30 capsule 0    triamcinolone acetonide 0.1% (KENALOG) 0.1 % cream APPLY AA BID PRN  0    vitamin B complex (SUPER B COMPLEX-B-12 ORAL) Take by mouth.       No current facility-administered medications on file prior to visit.         Objective:     PHYSICAL EXAM:  Vital Signs (Most Recent)  Pulse: 83 (06/14/24 1310)  BP: (!) 154/83 (06/14/24 1310)  SpO2: 96 % (06/14/24 1310)    ROS A 10+ review of systems was performed with pertinent positives and negatives noted above in the history of present illness.  Other systems were negative unless otherwise specified.    Physical Exam  Vitals and nursing note reviewed.   Constitutional:       Appearance: Normal appearance.   HENT:      Head: Normocephalic and atraumatic.   Cardiovascular:      Rate and Rhythm: Normal rate.   Pulmonary:      Effort: Pulmonary effort is normal.   Abdominal:      General: Abdomen is flat.      Palpations: Abdomen is soft.   Skin:     General: Skin is warm and dry.      Capillary Refill: Capillary refill takes less than 2 seconds.   Neurological:      Mental Status: She is alert and oriented to person, place, and time.   Psychiatric:         Mood and Affect: Mood normal.         Behavior: Behavior normal.                       Assessment:     58 y.o. female with two lipomas of right arm    Plan:     - Plan for excision of subcutaneous mass, likely representative of lipoma  -  Plan explained to patient, all questions answered and patient is comfortable with plan    Freda De Souza MD   [facility]

## 2024-06-17 ENCOUNTER — LAB VISIT (OUTPATIENT)
Dept: LAB | Facility: HOSPITAL | Age: 59
End: 2024-06-17
Attending: INTERNAL MEDICINE
Payer: COMMERCIAL

## 2024-06-17 DIAGNOSIS — E78.2 MIXED HYPERLIPIDEMIA: ICD-10-CM

## 2024-06-17 DIAGNOSIS — E04.9 THYROID GOITER: ICD-10-CM

## 2024-06-17 LAB
ALBUMIN SERPL BCP-MCNC: 3.9 G/DL (ref 3.5–5.2)
ALP SERPL-CCNC: 93 U/L (ref 55–135)
ALT SERPL W/O P-5'-P-CCNC: 19 U/L (ref 10–44)
ANION GAP SERPL CALC-SCNC: 8 MMOL/L (ref 8–16)
AST SERPL-CCNC: 20 U/L (ref 10–40)
BILIRUB SERPL-MCNC: 0.4 MG/DL (ref 0.1–1)
BUN SERPL-MCNC: 13 MG/DL (ref 6–20)
CALCIUM SERPL-MCNC: 9.8 MG/DL (ref 8.7–10.5)
CHLORIDE SERPL-SCNC: 106 MMOL/L (ref 95–110)
CHOLEST SERPL-MCNC: 241 MG/DL (ref 120–199)
CHOLEST/HDLC SERPL: 4.3 {RATIO} (ref 2–5)
CO2 SERPL-SCNC: 27 MMOL/L (ref 23–29)
CREAT SERPL-MCNC: 0.8 MG/DL (ref 0.5–1.4)
EST. GFR  (NO RACE VARIABLE): >60 ML/MIN/1.73 M^2
GLUCOSE SERPL-MCNC: 97 MG/DL (ref 70–110)
HDLC SERPL-MCNC: 56 MG/DL (ref 40–75)
HDLC SERPL: 23.2 % (ref 20–50)
LDLC SERPL CALC-MCNC: 160 MG/DL (ref 63–159)
NONHDLC SERPL-MCNC: 185 MG/DL
POTASSIUM SERPL-SCNC: 3.7 MMOL/L (ref 3.5–5.1)
PROT SERPL-MCNC: 6.7 G/DL (ref 6–8.4)
SODIUM SERPL-SCNC: 141 MMOL/L (ref 136–145)
TRIGL SERPL-MCNC: 125 MG/DL (ref 30–150)
TSH SERPL DL<=0.005 MIU/L-ACNC: 1.19 UIU/ML (ref 0.4–4)

## 2024-06-17 PROCEDURE — 36415 COLL VENOUS BLD VENIPUNCTURE: CPT | Performed by: INTERNAL MEDICINE

## 2024-06-17 PROCEDURE — 84443 ASSAY THYROID STIM HORMONE: CPT | Performed by: INTERNAL MEDICINE

## 2024-06-17 PROCEDURE — 80061 LIPID PANEL: CPT | Performed by: INTERNAL MEDICINE

## 2024-06-17 PROCEDURE — 80053 COMPREHEN METABOLIC PANEL: CPT | Performed by: INTERNAL MEDICINE

## 2024-06-17 NOTE — PROGRESS NOTES
The 10-year ASCVD risk score (Juliette GRAY, et al., 2019) is: 9.5%    Values used to calculate the score:      Age: 58 years      Sex: Female      Is Non- : Yes      Diabetic: No      Tobacco smoker: No      Systolic Blood Pressure: 154 mmHg      Is BP treated: No      HDL Cholesterol: 56 mg/dL      Total Cholesterol: 241 mg/dL  Labs recveal elevated cholesterol wopuld recommend starting Crestor 10 mg . Will send in if patient agrees

## 2024-06-20 ENCOUNTER — PROCEDURE VISIT (OUTPATIENT)
Dept: SURGERY | Facility: CLINIC | Age: 59
End: 2024-06-20
Payer: COMMERCIAL

## 2024-06-20 VITALS
HEIGHT: 68 IN | OXYGEN SATURATION: 97 % | SYSTOLIC BLOOD PRESSURE: 193 MMHG | BODY MASS INDEX: 35.95 KG/M2 | WEIGHT: 237.19 LBS | DIASTOLIC BLOOD PRESSURE: 92 MMHG | HEART RATE: 82 BPM

## 2024-06-20 DIAGNOSIS — R22.31 MASS OF ARM, RIGHT: Primary | ICD-10-CM

## 2024-06-20 PROCEDURE — 88307 TISSUE EXAM BY PATHOLOGIST: CPT | Mod: 59 | Performed by: PATHOLOGY

## 2024-06-20 PROCEDURE — 88307 TISSUE EXAM BY PATHOLOGIST: CPT | Mod: 26,,, | Performed by: PATHOLOGY

## 2024-06-20 PROCEDURE — 24075 EXC ARM/ELBOW LES SC < 3 CM: CPT | Mod: RT,S$GLB,, | Performed by: STUDENT IN AN ORGANIZED HEALTH CARE EDUCATION/TRAINING PROGRAM

## 2024-06-20 NOTE — PROCEDURES
Exc, Tumor Soft Tissue    Date/Time: 6/20/2024 11:00 AM    Performed by: Freda De Souza MD  Authorized by: Frdea De Souza MD    Consent Done?:  Yes (Written)  Timeout: prior to procedure the correct patient, procedure, and site was verified    Prep: patient was prepped and draped in usual sterile fashion    Local anesthesia used?: Yes    Anesthesia:  Local infiltration  Local anesthetic:  Lidocaine 1% with epinephrine  Anesthetic total (ml):  10  Indications:  Lipoma  Body area:  Upper arm / elbow  Laterality:  Right  Position:  Supine  Anesthesia:  Local infiltration  Local anesthetic:  Lidocaine 1% with epinephrine  Excision type:  Skin  Malignancy:  Benign  Scalpel size:  15  Incision type:  Single straight  Specimens?: Yes     Specimens submitted to pathology.   Hemostasis was obtained.  Wound closure:  Simple  Wound repair size (cm):  2  Sutures:  3-0 Vicryl and 4-0 Monocryl   Needle, instrument, and sponge counts were correct.   Patient tolerated the procedure well with no immediate complications.   Post-operative instructions were provided for the patient.  Exc, Tumor Soft Tissue    Date/Time: 6/20/2024 11:00 AM    Performed by: Freda De Souza MD  Authorized by: Freda De Souza MD    Consent Done?:  Yes (Written)  Timeout: prior to procedure the correct patient, procedure, and site was verified    Prep: patient was prepped and draped in usual sterile fashion    Local anesthesia used?: Yes    Local anesthetic:  Lidocaine 1% with epinephrine  Anesthetic total (ml):  5  Indications:  Lipoma  Body area:  Lower arm / wrist  Laterality:  Right  Position:  Supine  Local anesthetic:  Lidocaine 1% with epinephrine  Excision type:  Skin  Malignancy:  Benign  Scalpel size:  15  Incision type:  Single straight  Specimens?: Yes     Specimens submitted to pathology.   Hemostasis was obtained.  Sutures:  3-0 Vicryl and 4-0 Monocryl   Needle, instrument, and sponge counts were correct.   Patient tolerated  the procedure well with no immediate complications.   Post-operative instructions were provided for the patient.  Comments:      Dermabond applied to incisions

## 2024-06-23 ENCOUNTER — PATIENT MESSAGE (OUTPATIENT)
Dept: SURGERY | Facility: CLINIC | Age: 59
End: 2024-06-23
Payer: COMMERCIAL

## 2024-06-24 ENCOUNTER — TELEPHONE (OUTPATIENT)
Dept: SURGERY | Facility: CLINIC | Age: 59
End: 2024-06-24
Payer: COMMERCIAL

## 2024-06-24 LAB
FINAL PATHOLOGIC DIAGNOSIS: NORMAL
GROSS: NORMAL
Lab: NORMAL

## 2024-06-24 RX ORDER — DOXYCYCLINE 100 MG/1
100 CAPSULE ORAL 2 TIMES DAILY
Qty: 10 CAPSULE | Refills: 0 | Status: SHIPPED | OUTPATIENT
Start: 2024-06-24 | End: 2024-06-29

## 2024-06-24 NOTE — TELEPHONE ENCOUNTER
----- Message from Lauryn Carreno sent at 6/24/2024  9:53 AM CDT -----  Pt calling in regards to her arm , its red ,itchy , painful and  swollen  , please call     Confirmed patient's contact info below:  Contact Name: Alcira Selby  Phone Number: [phone]

## 2024-06-28 ENCOUNTER — TELEPHONE (OUTPATIENT)
Dept: SURGERY | Facility: CLINIC | Age: 59
End: 2024-06-28
Payer: COMMERCIAL

## 2024-06-28 NOTE — TELEPHONE ENCOUNTER
----- Message from Marcelle Darby sent at 6/28/2024  1:29 PM CDT -----  Regarding: call back  Contact: [phone]  Pt calling in regarding right arm itching  and its red and still have  bumps pt is requesting call back please call to discuss Further      I called and spoke to the Patient.  I reviewed her chart and the picture that she had sent in.  She stated that the covering over the incision was removed and steri strips were applied when she came into the Clinic.  Stated that those steri strips have come off.  She stated that after the procedure she took Ibuprofen and Benadryl without any relief.  Stated that she is no longer having pain.      I suggested that she use over the counter Benadryl cream and/or over the counter Cortisone cream for the itching.  I informed her that she can also use oral Benadryl.  I instructed her not to apply the creams directly on the incision, but only on the surrounding skin.  I told her that I would call her on Monday to check on her.  She did not have any questions at present.

## 2024-07-02 ENCOUNTER — TELEPHONE (OUTPATIENT)
Dept: SURGERY | Facility: CLINIC | Age: 59
End: 2024-07-02
Payer: COMMERCIAL

## 2024-07-02 ENCOUNTER — PATIENT MESSAGE (OUTPATIENT)
Dept: SURGERY | Facility: CLINIC | Age: 59
End: 2024-07-02
Payer: COMMERCIAL

## 2024-07-02 NOTE — TELEPHONE ENCOUNTER
Addressed with pt by Nurse covering on 6/28 taking initial call. After reviewing pictures together, she did notify pt of  arm discoloration being normal and healing well after procedure and skin irritation and to continue to monitor for any s/s of infection.

## 2024-07-02 NOTE — TELEPHONE ENCOUNTER
I called and spoke to the Patient.  She said that the redness and the itching has improved since using the Benadryl cream.  Stated that she has some darkness around the incision.  I asked if she would send another picture because in the 6-23-24 picture, I can see that the surrounding tissue is a little discolored.  Stated that she would.  I told her that Honey was back with Dr. Ness.  She did not have any other concerns at present.

## 2024-07-10 DIAGNOSIS — G44.229 CHRONIC TENSION-TYPE HEADACHE, NOT INTRACTABLE: ICD-10-CM

## 2024-07-10 RX ORDER — BUTALBITAL, ACETAMINOPHEN AND CAFFEINE 50; 325; 40 MG/1; MG/1; MG/1
1 TABLET ORAL EVERY 6 HOURS PRN
Qty: 60 TABLET | Refills: 0 | Status: SHIPPED | OUTPATIENT
Start: 2024-07-10

## 2024-07-10 NOTE — TELEPHONE ENCOUNTER
No care due was identified.  [facility] Embedded Care Due Messages. Reference number: [id number].   7/10/2024 11:22:28 AM CDT

## 2024-08-21 ENCOUNTER — OFFICE VISIT (OUTPATIENT)
Dept: PRIMARY CARE CLINIC | Facility: CLINIC | Age: 59
End: 2024-08-21
Payer: COMMERCIAL

## 2024-08-21 ENCOUNTER — LAB VISIT (OUTPATIENT)
Dept: LAB | Facility: HOSPITAL | Age: 59
End: 2024-08-21
Attending: INTERNAL MEDICINE
Payer: COMMERCIAL

## 2024-08-21 VITALS
RESPIRATION RATE: 18 BRPM | HEART RATE: 64 BPM | HEIGHT: 68 IN | BODY MASS INDEX: 36.22 KG/M2 | TEMPERATURE: 97 F | SYSTOLIC BLOOD PRESSURE: 128 MMHG | OXYGEN SATURATION: 95 % | DIASTOLIC BLOOD PRESSURE: 80 MMHG | WEIGHT: 239 LBS

## 2024-08-21 DIAGNOSIS — Z00.00 ANNUAL PHYSICAL EXAM: Primary | ICD-10-CM

## 2024-08-21 DIAGNOSIS — G44.229 CHRONIC TENSION-TYPE HEADACHE, NOT INTRACTABLE: ICD-10-CM

## 2024-08-21 DIAGNOSIS — Z11.59 ENCOUNTER FOR HEPATITIS C SCREENING TEST FOR LOW RISK PATIENT: ICD-10-CM

## 2024-08-21 DIAGNOSIS — Z00.00 ANNUAL PHYSICAL EXAM: ICD-10-CM

## 2024-08-21 DIAGNOSIS — Z11.4 ENCOUNTER FOR SCREENING FOR HIV: ICD-10-CM

## 2024-08-21 LAB
ALBUMIN SERPL BCP-MCNC: 4.2 G/DL (ref 3.5–5.2)
ALP SERPL-CCNC: 93 U/L (ref 55–135)
ALT SERPL W/O P-5'-P-CCNC: 19 U/L (ref 10–44)
ANION GAP SERPL CALC-SCNC: 8 MMOL/L (ref 8–16)
AST SERPL-CCNC: 19 U/L (ref 10–40)
BASOPHILS # BLD AUTO: 0.06 K/UL (ref 0–0.2)
BASOPHILS NFR BLD: 1.4 % (ref 0–1.9)
BILIRUB SERPL-MCNC: 0.3 MG/DL (ref 0.1–1)
BUN SERPL-MCNC: 23 MG/DL (ref 6–20)
CALCIUM SERPL-MCNC: 9.7 MG/DL (ref 8.7–10.5)
CHLORIDE SERPL-SCNC: 109 MMOL/L (ref 95–110)
CHOLEST SERPL-MCNC: 252 MG/DL (ref 120–199)
CHOLEST/HDLC SERPL: 4.1 {RATIO} (ref 2–5)
CO2 SERPL-SCNC: 26 MMOL/L (ref 23–29)
CREAT SERPL-MCNC: 0.8 MG/DL (ref 0.5–1.4)
DIFFERENTIAL METHOD BLD: ABNORMAL
EOSINOPHIL # BLD AUTO: 0.1 K/UL (ref 0–0.5)
EOSINOPHIL NFR BLD: 2.1 % (ref 0–8)
ERYTHROCYTE [DISTWIDTH] IN BLOOD BY AUTOMATED COUNT: 14.3 % (ref 11.5–14.5)
EST. GFR  (NO RACE VARIABLE): >60 ML/MIN/1.73 M^2
GLUCOSE SERPL-MCNC: 94 MG/DL (ref 70–110)
HCT VFR BLD AUTO: 41 % (ref 37–48.5)
HCV AB SERPL QL IA: NORMAL
HDLC SERPL-MCNC: 61 MG/DL (ref 40–75)
HDLC SERPL: 24.2 % (ref 20–50)
HGB BLD-MCNC: 13.3 G/DL (ref 12–16)
HIV 1+2 AB+HIV1 P24 AG SERPL QL IA: NORMAL
IMM GRANULOCYTES # BLD AUTO: 0.02 K/UL (ref 0–0.04)
IMM GRANULOCYTES NFR BLD AUTO: 0.5 % (ref 0–0.5)
LDLC SERPL CALC-MCNC: 174.8 MG/DL (ref 63–159)
LYMPHOCYTES # BLD AUTO: 2.4 K/UL (ref 1–4.8)
LYMPHOCYTES NFR BLD: 55.5 % (ref 18–48)
MCH RBC QN AUTO: 29.6 PG (ref 27–31)
MCHC RBC AUTO-ENTMCNC: 32.4 G/DL (ref 32–36)
MCV RBC AUTO: 91 FL (ref 82–98)
MONOCYTES # BLD AUTO: 0.3 K/UL (ref 0.3–1)
MONOCYTES NFR BLD: 7.6 % (ref 4–15)
NEUTROPHILS # BLD AUTO: 1.4 K/UL (ref 1.8–7.7)
NEUTROPHILS NFR BLD: 32.9 % (ref 38–73)
NONHDLC SERPL-MCNC: 191 MG/DL
NRBC BLD-RTO: 0 /100 WBC
PLATELET # BLD AUTO: 263 K/UL (ref 150–450)
PMV BLD AUTO: 11.1 FL (ref 9.2–12.9)
POTASSIUM SERPL-SCNC: 4 MMOL/L (ref 3.5–5.1)
PROT SERPL-MCNC: 7.2 G/DL (ref 6–8.4)
RBC # BLD AUTO: 4.5 M/UL (ref 4–5.4)
SODIUM SERPL-SCNC: 143 MMOL/L (ref 136–145)
TRIGL SERPL-MCNC: 81 MG/DL (ref 30–150)
TSH SERPL DL<=0.005 MIU/L-ACNC: 0.51 UIU/ML (ref 0.4–4)
WBC # BLD AUTO: 4.36 K/UL (ref 3.9–12.7)

## 2024-08-21 PROCEDURE — 99999 PR PBB SHADOW E&M-EST. PATIENT-LVL IV: CPT | Mod: PBBFAC,,, | Performed by: INTERNAL MEDICINE

## 2024-08-21 PROCEDURE — 80061 LIPID PANEL: CPT | Performed by: INTERNAL MEDICINE

## 2024-08-21 PROCEDURE — 85025 COMPLETE CBC W/AUTO DIFF WBC: CPT | Performed by: INTERNAL MEDICINE

## 2024-08-21 PROCEDURE — 86803 HEPATITIS C AB TEST: CPT | Performed by: INTERNAL MEDICINE

## 2024-08-21 PROCEDURE — 80053 COMPREHEN METABOLIC PANEL: CPT | Performed by: INTERNAL MEDICINE

## 2024-08-21 PROCEDURE — 1160F RVW MEDS BY RX/DR IN RCRD: CPT | Mod: CPTII,S$GLB,, | Performed by: INTERNAL MEDICINE

## 2024-08-21 PROCEDURE — 99396 PREV VISIT EST AGE 40-64: CPT | Mod: S$GLB,,, | Performed by: INTERNAL MEDICINE

## 2024-08-21 PROCEDURE — 1159F MED LIST DOCD IN RCRD: CPT | Mod: CPTII,S$GLB,, | Performed by: INTERNAL MEDICINE

## 2024-08-21 PROCEDURE — 3008F BODY MASS INDEX DOCD: CPT | Mod: CPTII,S$GLB,, | Performed by: INTERNAL MEDICINE

## 2024-08-21 PROCEDURE — 3074F SYST BP LT 130 MM HG: CPT | Mod: CPTII,S$GLB,, | Performed by: INTERNAL MEDICINE

## 2024-08-21 PROCEDURE — 3079F DIAST BP 80-89 MM HG: CPT | Mod: CPTII,S$GLB,, | Performed by: INTERNAL MEDICINE

## 2024-08-21 PROCEDURE — 84443 ASSAY THYROID STIM HORMONE: CPT | Performed by: INTERNAL MEDICINE

## 2024-08-21 PROCEDURE — 36415 COLL VENOUS BLD VENIPUNCTURE: CPT | Performed by: INTERNAL MEDICINE

## 2024-08-21 PROCEDURE — 87389 HIV-1 AG W/HIV-1&-2 AB AG IA: CPT | Performed by: INTERNAL MEDICINE

## 2024-08-21 RX ORDER — BUTALBITAL, ACETAMINOPHEN AND CAFFEINE 50; 325; 40 MG/1; MG/1; MG/1
1 TABLET ORAL EVERY 6 HOURS PRN
Qty: 60 TABLET | Refills: 0 | Status: SHIPPED | OUTPATIENT
Start: 2024-08-21

## 2024-08-21 NOTE — TELEPHONE ENCOUNTER
No care due was identified.  [facility] Embedded Care Due Messages. Reference number: [id number].   8/21/2024 12:03:17 PM CDT

## 2024-08-21 NOTE — PROGRESS NOTES
[facility] Note    Chief Complaint      Chief Complaint   Patient presents with    Annual Exam       History of Present Illness      Alcira Selby is a 59 y.o. female who presents today for   Chief Complaint   Patient presents with    Annual Exam   .  Patient comes to appointment here for annual preventative. She need sfull screening labs with this visit . She is stable on his current medication regimen  . She is active with adls . She is uptodate with mammogram and colorectal screening .     She is stable on her current regimen for cth . Fiormarianna prn is working well     Problem List Items Addressed This Visit          Neuro    Chronic tension-type headache, not intractable    Overview      ayanat is workign well stable             ID    Encounter for hepatitis C screening test for low risk patient    Encounter for screening for HIV       Other    Annual physical exam - Primary    Overview     Pe documented needs full screening labs               Past Medical History:  Past Medical History:   Diagnosis Date    Breast cancer     2004    Personal history of colonic polyps        Past Surgical History:  Past Surgical History:   Procedure Laterality Date    AXILLARY NODE DISSECTION Right 7/17/2019    Procedure: LYMPHADENECTOMY, AXILLARY RIGHT;  Surgeon: Darin Vaughan MD;  Location: [address];  Service: General;  Laterality: Right;    BREAST SURGERY      COLONOSCOPY N/A 12/12/2019    Procedure: COLONOSCOPY;  Surgeon: Estephanie Pratt MD;  Location: [facility] (4TH FLR);  Service: Endoscopy;  Laterality: N/A;    FOOT SURGERY Bilateral     Plantar Fascities    HYSTERECTOMY      INCISION AND DRAINAGE Right 8/13/2019    Procedure: Incision and Drainage - right chest wall;  Surgeon: Darin Vaughan MD;  Location: [address];  Service: General;  Laterality: Right;    KNEE SURGERY Left     Total Knee Replacement    MASTECTOMY      MASTECTOMY Right 7/17/2019    Procedure:  MASTECTOMY RIGHT;  Surgeon: Darin Vaughan MD;  Location: [address];  Service: General;  Laterality: Right;    MYOMECTOMY      SENTINEL LYMPH NODE BIOPSY Right 7/17/2019    Procedure: BIOPSY, LYMPH NODE, SENTINEL RIGHT;  Surgeon: Darin Vaughan MD;  Location: [address];  Service: General;  Laterality: Right;    TONSILLECTOMY         Family History:  family history includes Cancer in her maternal aunt; Hypertension in her father and mother.    Social History:  Social History     Socioeconomic History    Marital status:    Tobacco Use    Smoking status: Never    Smokeless tobacco: Never   Substance and Sexual Activity    Alcohol use: No    Drug use: No     Social Determinants of Health     Financial Resource Strain: High Risk (10/5/2020)    Overall Financial Resource Strain (CARDIA)     Difficulty of Paying Living Expenses: Very hard   Food Insecurity: Food Insecurity Present (10/5/2020)    Hunger Vital Sign     Worried About Running Out of Food in the Last Year: Sometimes true     Ran Out of Food in the Last Year: Sometimes true   Transportation Needs: No Transportation Needs (10/5/2020)    PRAPARE - Transportation     Lack of Transportation (Medical): No     Lack of Transportation (Non-Medical): No   Physical Activity: Unknown (10/5/2020)    Exercise Vital Sign     Days of Exercise per Week: 7 days   Stress: No Stress Concern Present (10/5/2020)    [employer] - Occupational Stress Questionnaire     Feeling of Stress : Not at all       Review of Systems:   Review of Systems   Constitutional:  Negative for fever and weight loss.   HENT:  Negative for congestion, hearing loss and sore throat.    Eyes:  Negative for blurred vision.   Respiratory:  Negative for cough and shortness of breath.    Cardiovascular:  Negative for chest pain, palpitations, claudication and leg swelling.   Gastrointestinal:  Negative for abdominal pain, constipation, diarrhea and  heartburn.   Genitourinary:  Negative for dysuria.   Musculoskeletal:  Negative for back pain and myalgias.   Skin:  Negative for rash.   Neurological:  Negative for focal weakness and headaches.   Psychiatric/Behavioral:  Negative for depression, memory loss and suicidal ideas. The patient is not nervous/anxious.          Medications:  Outpatient Encounter Medications as of 8/21/2024   Medication Sig Note Dispense Refill    (Magic mouthwash) 1:1:1 diphenhydrAMINE(Benadryl) 12.5mg/5ml liq, aluminum & magnesium hydroxide-simethicone (Maalox), LIDOcaine viscous 2% Swish and spit 10 mLs every 4 (four) hours as needed (severe sore throat). for mouth sores  400 mL 0    brompheniramine-pseudoeph-DM (BROMFED DM) 2-30-10 mg/5 mL Syrp TAKE 5 ML BY MOUTH FOUR TIMES DAILY AS NEEDED  118 mL 0    butalbital-acetaminophen-caffeine -40 mg (FIORICET, ESGIC) -40 mg per tablet Take 1 tablet by mouth every 6 (six) hours as needed for Headaches.  60 tablet 0    cyanocobalamin (VITAMIN B-12) 100 MCG tablet Take by mouth. 7/16/2019: Hold am of procedure       docusate sodium (COLACE) 100 MG capsule Take 100 mg by mouth 2 (two) times daily. 7/16/2019: Hold  am of procedure       fluticasone propionate (FLONASE) 50 mcg/actuation nasal spray INHALE 1 SPRAY IN EACH NOSTRIL EVERY DAY.  48 g 3    gabapentin (NEURONTIN) 300 MG capsule TAKE 1 CAPSULE(300 MG) BY MOUTH EVERY EVENING  30 capsule 5    multivit-min/iron/folic/lutein (MULTIVITAMIN WOMEN 50 PLUS ORAL)        ondansetron (ZOFRAN) 4 MG tablet Take 1 tablet (4 mg total) by mouth every 8 (eight) hours as needed for Nausea.  10 tablet 3    temazepam (RESTORIL) 15 mg Cap Take 1 capsule (15 mg total) by mouth nightly as needed.  30 capsule 0    triamcinolone acetonide 0.1% (KENALOG) 0.1 % cream APPLY AA BID PRN   0    vitamin B complex (SUPER B COMPLEX-B-12 ORAL) Take by mouth. 7/16/2019: Hold am of procedure        No facility-administered encounter medications on file as of  "8/21/2024.        Allergies:  Review of patient's allergies indicates:   Allergen Reactions    Dermabond [tissue glues] Rash    Sulfamethoxazole-trimethoprim Hives and Itching         Physical Exam         Vitals:    08/21/24 1113   BP: 128/80   Pulse: 64   Resp: 18   Temp: 97 °F (36.1 °C)         Physical Exam  Constitutional:       Appearance: She is well-developed.   Eyes:      Pupils: Pupils are equal, round, and reactive to light.   Neck:      Thyroid: No thyromegaly.   Cardiovascular:      Rate and Rhythm: Normal rate.      Heart sounds: Normal heart sounds. No murmur heard.     No friction rub. No gallop.   Pulmonary:      Breath sounds: Normal breath sounds.   Abdominal:      General: Bowel sounds are normal.      Palpations: Abdomen is soft.   Musculoskeletal:         General: Normal range of motion.      Cervical back: Normal range of motion.   Lymphadenopathy:      Cervical: No cervical adenopathy.   Skin:     General: Skin is warm.      Findings: No rash.   Neurological:      Mental Status: She is alert and oriented to person, place, and time.      Cranial Nerves: No cranial nerve deficit.   Psychiatric:         Behavior: Behavior normal.          Laboratory:  CBC:  No results for input(s): "WBC", "RBC", "HGB", "HCT", "PLT", "MCV", "MCH", "MCHC" in the last 2160 hours.  CMP:  Recent Labs   Lab Result Units 06/17/24  0919   Glucose mg/dL 97   Calcium mg/dL 9.8   Albumin g/dL 3.9   Total Protein g/dL 6.7   Sodium mmol/L 141   Potassium mmol/L 3.7   CO2 mmol/L 27   Chloride mmol/L 106   BUN mg/dL 13   Alkaline Phosphatase U/L 93   ALT U/L 19   AST U/L 20   Total Bilirubin mg/dL 0.4     URINALYSIS:  No results for input(s): "COLORU", "CLARITYU", "SPECGRAV", "PHUR", "PROTEINUA", "GLUCOSEU", "BILIRUBINCON", "BLOODU", "WBCU", "RBCU", "BACTERIA", "MUCUS", "NITRITE", "LEUKOCYTESUR", "UROBILINOGEN", "HYALINECASTS" in the last 2160 hours.   LIPIDS:  Recent Labs   Lab Result Units 06/17/24  0919   TSH uIU/mL 1.190 " "  HDL mg/dL 56   Cholesterol mg/dL 241*   Triglycerides mg/dL 125   LDL Cholesterol mg/dL 160.0*   HDL/Cholesterol Ratio % 23.2   Non-HDL Cholesterol mg/dL 185   Total Cholesterol/HDL Ratio  4.3     TSH:  Recent Labs   Lab Result Units 06/17/24  0919   TSH uIU/mL 1.190     A1C:  No results for input(s): "HGBA1C" in the last 2160 hours.    Radiology:        Assessment:     Alcira Selby is a 59 y.o.female with:    Annual physical exam  -     CBC Auto Differential; Future; Expected date: 08/21/2024  -     Comprehensive Metabolic Panel; Future; Expected date: 08/21/2024  -     Lipid Panel; Future; Expected date: 08/21/2024  -     TSH; Future; Expected date: 08/21/2024    Encounter for hepatitis C screening test for low risk patient  -     Hepatitis C Antibody; Future; Expected date: 08/21/2024    Encounter for screening for HIV  -     HIV 1/2 Ag/Ab (4th Gen); Future; Expected date: 08/21/2024    Chronic tension-type headache, not intractable          Plan:     Problem List Items Addressed This Visit          Neuro    Chronic tension-type headache, not intractable    Overview      ayanat is workign well stable             ID    Encounter for hepatitis C screening test for low risk patient    Encounter for screening for HIV       Other    Annual physical exam - Primary    Overview     Pe documented needs full screening labs             As above, continue current medications and maintain follow up with specialists.  Return to clinic in 6 months.      Frederick W Dantagnan [facility]                  "

## 2024-08-22 NOTE — PROGRESS NOTES
The 10-year ASCVD risk score (Juliette GRAY, et al., 2019) is: 6%    Values used to calculate the score:      Age: 59 years      Sex: Female      Is Non- : Yes      Diabetic: No      Tobacco smoker: No      Systolic Blood Pressure: 128 mmHg      Is BP treated: No      HDL Cholesterol: 61 mg/dL      Total Cholesterol: 252 mg/dL  Cholesterol elevated , borderline risk rec low cholesterol diet and increased exercise

## 2024-09-22 DIAGNOSIS — G44.229 CHRONIC TENSION-TYPE HEADACHE, NOT INTRACTABLE: ICD-10-CM

## 2024-09-22 NOTE — TELEPHONE ENCOUNTER
No care due was identified.  [facility] Embedded Care Due Messages. Reference number: [id number].   9/22/2024 6:16:04 PM CDT

## 2024-09-23 RX ORDER — BUTALBITAL, ACETAMINOPHEN AND CAFFEINE 50; 325; 40 MG/1; MG/1; MG/1
1 TABLET ORAL EVERY 6 HOURS PRN
Qty: 60 TABLET | Refills: 0 | Status: SHIPPED | OUTPATIENT
Start: 2024-09-23

## 2024-10-18 DIAGNOSIS — G44.229 CHRONIC TENSION-TYPE HEADACHE, NOT INTRACTABLE: ICD-10-CM

## 2024-10-18 NOTE — TELEPHONE ENCOUNTER
No care due was identified.  [facility] Embedded Care Due Messages. Reference number: [id number].   10/18/2024 4:33:43 PM CDT

## 2024-10-21 RX ORDER — BUTALBITAL, ACETAMINOPHEN AND CAFFEINE 50; 325; 40 MG/1; MG/1; MG/1
1 TABLET ORAL EVERY 6 HOURS PRN
Qty: 60 TABLET | Refills: 0 | Status: SHIPPED | OUTPATIENT
Start: 2024-10-21

## 2024-11-22 DIAGNOSIS — G44.229 CHRONIC TENSION-TYPE HEADACHE, NOT INTRACTABLE: ICD-10-CM

## 2024-11-22 RX ORDER — BUTALBITAL, ACETAMINOPHEN AND CAFFEINE 50; 325; 40 MG/1; MG/1; MG/1
1 TABLET ORAL EVERY 6 HOURS PRN
Qty: 60 TABLET | Refills: 0 | Status: SHIPPED | OUTPATIENT
Start: 2024-11-22

## 2024-11-22 NOTE — TELEPHONE ENCOUNTER
No care due was identified.  [facility] Embedded Care Due Messages. Reference number: [id number].   11/22/2024 11:00:03 AM CST

## 2024-12-08 DIAGNOSIS — J06.9 URI WITH COUGH AND CONGESTION: Primary | ICD-10-CM

## 2024-12-09 RX ORDER — BROMPHENIRAMINE MALEATE, PSEUDOEPHEDRINE HYDROCHLORIDE, AND DEXTROMETHORPHAN HYDROBROMIDE 2; 30; 10 MG/5ML; MG/5ML; MG/5ML
SYRUP ORAL
Qty: 118 ML | Refills: 0 | Status: SHIPPED | OUTPATIENT
Start: 2024-12-09

## 2024-12-09 NOTE — TELEPHONE ENCOUNTER
No care due was identified.  Health [facility] Embedded Care Due Messages. Reference number: [id number].   12/08/2024 9:54:47 PM CST

## 2024-12-10 ENCOUNTER — OFFICE VISIT (OUTPATIENT)
Dept: PRIMARY CARE CLINIC | Facility: CLINIC | Age: 59
End: 2024-12-10
Payer: COMMERCIAL

## 2024-12-10 VITALS
BODY MASS INDEX: 35.35 KG/M2 | TEMPERATURE: 99 F | DIASTOLIC BLOOD PRESSURE: 80 MMHG | RESPIRATION RATE: 18 BRPM | HEART RATE: 73 BPM | OXYGEN SATURATION: 98 % | WEIGHT: 233.25 LBS | HEIGHT: 68 IN | SYSTOLIC BLOOD PRESSURE: 124 MMHG

## 2024-12-10 DIAGNOSIS — N32.81 OAB (OVERACTIVE BLADDER): ICD-10-CM

## 2024-12-10 DIAGNOSIS — E78.2 MIXED HYPERLIPIDEMIA: ICD-10-CM

## 2024-12-10 DIAGNOSIS — G47.00 INSOMNIA, UNSPECIFIED TYPE: ICD-10-CM

## 2024-12-10 DIAGNOSIS — G44.229 CHRONIC TENSION-TYPE HEADACHE, NOT INTRACTABLE: ICD-10-CM

## 2024-12-10 DIAGNOSIS — D05.11 DUCTAL CARCINOMA IN SITU (DCIS) OF RIGHT BREAST: Primary | ICD-10-CM

## 2024-12-10 PROCEDURE — 99999 PR PBB SHADOW E&M-EST. PATIENT-LVL IV: CPT | Mod: PBBFAC,,, | Performed by: INTERNAL MEDICINE

## 2024-12-10 PROCEDURE — 3008F BODY MASS INDEX DOCD: CPT | Mod: CPTII,S$GLB,, | Performed by: INTERNAL MEDICINE

## 2024-12-10 PROCEDURE — 1159F MED LIST DOCD IN RCRD: CPT | Mod: CPTII,S$GLB,, | Performed by: INTERNAL MEDICINE

## 2024-12-10 PROCEDURE — 1160F RVW MEDS BY RX/DR IN RCRD: CPT | Mod: CPTII,S$GLB,, | Performed by: INTERNAL MEDICINE

## 2024-12-10 PROCEDURE — 3074F SYST BP LT 130 MM HG: CPT | Mod: CPTII,S$GLB,, | Performed by: INTERNAL MEDICINE

## 2024-12-10 PROCEDURE — 3079F DIAST BP 80-89 MM HG: CPT | Mod: CPTII,S$GLB,, | Performed by: INTERNAL MEDICINE

## 2024-12-10 PROCEDURE — 99214 OFFICE O/P EST MOD 30 MIN: CPT | Mod: S$GLB,,, | Performed by: INTERNAL MEDICINE

## 2024-12-10 RX ORDER — BUTALBITAL, ACETAMINOPHEN AND CAFFEINE 50; 325; 40 MG/1; MG/1; MG/1
1 TABLET ORAL EVERY 6 HOURS PRN
Qty: 60 TABLET | Refills: 0 | Status: SHIPPED | OUTPATIENT
Start: 2024-12-10

## 2024-12-10 NOTE — PROGRESS NOTES
[facility]    Chief Complaint      Chief Complaint   Patient presents with    Follow-up     6M       History of Present Illness      Alcira Selby is a 59 y.o. female who presents today for   Chief Complaint   Patient presents with    Follow-up     6M   .  Patient comes to appointment here for 6m checkup for chronic issues . She needs flu vaccine today . She is stable on her current reigmen . Need srepat labs with next visit .    Problem List Items Addressed This Visit       Ductal carcinoma in situ (DCIS) of right breast - Primary    Overview     Is seeing dr vaughan  surgeon for initial surgery and surveillance  will be seeing dr mendez for breast reconstruction. Also is seeing dr wilkerson.   Awaiting dr mendez and insurance coverage for reconstructive procedure . Has had reconstructive surgery in June .is now seeing dr wilkerson for routine surveillance     2/24/21 stable as above   08/25/2021 stable   4/24/23 dr wilkerson managing surveillance   6/10 /24 stable  12/24 dr wilkerson managing surveillance          Chronic tension-type headache, not intractable    Overview      fiorcet is workign well stable          Mixed hyperlipidemia    Overview     Diet only repeat cmp and lipid next visit          OAB (overactive bladder)    Overview     No meds cont kegel          Insomnia    Overview      Temazepamas needed               Past Medical History:  Past Medical History:   Diagnosis Date    Breast cancer     2004    Personal history of colonic polyps        Past Surgical History:  Past Surgical History:   Procedure Laterality Date    AXILLARY NODE DISSECTION Right 7/17/2019    Procedure: LYMPHADENECTOMY, AXILLARY RIGHT;  Surgeon: Darin Vaughan MD;  Location: [address];  Service: General;  Laterality: Right;    BREAST SURGERY      COLONOSCOPY N/A 12/12/2019    Procedure: COLONOSCOPY;  Surgeon: Estephanie Pratt MD;  Location: [facility] (4TH FLR);  Service:  Endoscopy;  Laterality: N/A;    FOOT SURGERY Bilateral     Plantar Fascities    HYSTERECTOMY      INCISION AND DRAINAGE Right 8/13/2019    Procedure: Incision and Drainage - right chest wall;  Surgeon: Darin Vaughan MD;  Location: [facility] OR [address] [place] [address];  Service: General;  Laterality: Right;    KNEE SURGERY Left     Total Knee Replacement    MASTECTOMY      MASTECTOMY Right 7/17/2019    Procedure: MASTECTOMY RIGHT;  Surgeon: Darin Vaughan MD;  Location: [facility] OR [address] [place] [address];  Service: General;  Laterality: Right;    MYOMECTOMY      SENTINEL LYMPH NODE BIOPSY Right 7/17/2019    Procedure: BIOPSY, LYMPH NODE, SENTINEL RIGHT;  Surgeon: Darin Vaughan MD;  Location: [facility] OR [address] [place] [address];  Service: General;  Laterality: Right;    TONSILLECTOMY         Family History:  family history includes Cancer in her maternal aunt; Hypertension in her father and mother.    Social History:  Social History     Socioeconomic History    Marital status:    Tobacco Use    Smoking status: Never    Smokeless tobacco: Never   Substance and Sexual Activity    Alcohol use: No    Drug use: No     Social Drivers of Health     Financial Resource Strain: High Risk (10/5/2020)    Overall Financial Resource Strain (CARDIA)     Difficulty of Paying Living Expenses: Very hard   Food Insecurity: Food Insecurity Present (10/5/2020)    Hunger Vital Sign     Worried About Running Out of Food in the Last Year: Sometimes true     Ran Out of Food in the Last Year: Sometimes true   Transportation Needs: No Transportation Needs (10/5/2020)    PRAPARE - Transportation     Lack of Transportation (Medical): No     Lack of Transportation (Non-Medical): No   Physical Activity: Unknown (10/5/2020)    Exercise Vital Sign     Days of Exercise per Week: 7 days   Stress: No Stress Concern Present (10/5/2020)    [employer] - Occupational Stress Questionnaire     Feeling of Stress : Not at all       Review of Systems:   Review of Systems    Constitutional:  Negative for fever and weight loss.   HENT:  Negative for congestion, hearing loss and sore throat.    Eyes:  Negative for blurred vision.   Respiratory:  Negative for cough and shortness of breath.    Cardiovascular:  Negative for chest pain, palpitations, claudication and leg swelling.   Gastrointestinal:  Negative for abdominal pain, constipation, diarrhea, heartburn, nausea and vomiting.   Genitourinary:  Negative for dysuria.   Musculoskeletal:  Negative for back pain and myalgias.   Skin:  Negative for rash.   Neurological:  Positive for headaches. Negative for focal weakness.   Psychiatric/Behavioral:  Negative for depression, memory loss and suicidal ideas. The patient is not nervous/anxious.          Medications:  Outpatient Encounter Medications as of 12/10/2024   Medication Sig Note Dispense Refill    (Magic mouthwash) 1:1:1 diphenhydrAMINE(Benadryl) 12.5mg/5ml liq, aluminum & magnesium hydroxide-simethicone (Maalox), LIDOcaine viscous 2% Swish and spit 10 mLs every 4 (four) hours as needed (severe sore throat). for mouth sores  400 mL 0    brompheniramine-pseudoeph-DM (BROMFED DM) 2-30-10 mg/5 mL Syrp TAKE 5 ML BY MOUTH FOUR TIMES DAILY AS NEEDED  118 mL 0    cyanocobalamin (VITAMIN B-12) 100 MCG tablet Take by mouth. 7/16/2019: Hold am of procedure       docusate sodium (COLACE) 100 MG capsule Take 100 mg by mouth 2 (two) times daily. 7/16/2019: Hold  am of procedure       fluticasone propionate (FLONASE) 50 mcg/actuation nasal spray INHALE 1 SPRAY IN EACH NOSTRIL EVERY DAY.  48 g 3    gabapentin (NEURONTIN) 300 MG capsule TAKE 1 CAPSULE(300 MG) BY MOUTH EVERY EVENING  30 capsule 5    multivit-min/iron/folic/lutein (MULTIVITAMIN WOMEN 50 PLUS ORAL)        ondansetron (ZOFRAN) 4 MG tablet Take 1 tablet (4 mg total) by mouth every 8 (eight) hours as needed for Nausea.  10 tablet 3    temazepam (RESTORIL) 15 mg Cap Take 1 capsule (15 mg total) by mouth nightly as needed.  30 capsule 0     triamcinolone acetonide 0.1% (KENALOG) 0.1 % cream APPLY AA BID PRN   0    vitamin B complex (SUPER B COMPLEX-B-12 ORAL) Take by mouth. 7/16/2019: Hold am of procedure       [DISCONTINUED] butalbital-acetaminophen-caffeine -40 mg (FIORICET, ESGIC) -40 mg per tablet Take 1 tablet by mouth every 6 (six) hours as needed for Headaches.  60 tablet 0    butalbital-acetaminophen-caffeine -40 mg (FIORICET, ESGIC) -40 mg per tablet Take 1 tablet by mouth every 6 (six) hours as needed for Headaches.  60 tablet 0    [DISCONTINUED] brompheniramine-pseudoeph-DM (BROMFED DM) 2-30-10 mg/5 mL Syrp TAKE 5 ML BY MOUTH FOUR TIMES DAILY AS NEEDED  118 mL 0    [DISCONTINUED] butalbital-acetaminophen-caffeine -40 mg (FIORICET, ESGIC) -40 mg per tablet Take 1 tablet by mouth every 6 (six) hours as needed for Headaches.  60 tablet 0     No facility-administered encounter medications on file as of 12/10/2024.        Allergies:  Review of patient's allergies indicates:   Allergen Reactions    Dermabond [tissue glues] Rash    Sulfamethoxazole-trimethoprim Hives and Itching         Physical Exam         Vitals:    12/10/24 0854   BP: 124/80   Pulse: 73   Resp: 18   Temp: 98.7 °F (37.1 °C)         Physical Exam  Constitutional:       Appearance: She is well-developed. She is obese.   Eyes:      Pupils: Pupils are equal, round, and reactive to light.   Neck:      Thyroid: No thyromegaly.   Cardiovascular:      Rate and Rhythm: Normal rate.      Heart sounds: Normal heart sounds. No murmur heard.     No friction rub. No gallop.   Pulmonary:      Breath sounds: Normal breath sounds.   Abdominal:      General: Bowel sounds are normal.      Palpations: Abdomen is soft.   Musculoskeletal:         General: Normal range of motion.      Cervical back: Normal range of motion.   Lymphadenopathy:      Cervical: No cervical adenopathy.   Skin:     General: Skin is warm.      Findings: No rash.   Neurological:      Mental  "Status: She is alert and oriented to person, place, and time.      Cranial Nerves: No cranial nerve deficit.   Psychiatric:         Behavior: Behavior normal.          Laboratory:  CBC:  No results for input(s): "WBC", "RBC", "HGB", "HCT", "PLT", "MCV", "MCH", "MCHC" in the last 2160 hours.  CMP:  No results for input(s): "GLU", "CALCIUM", "ALBUMIN", "PROT", "NA", "K", "CO2", "CL", "BUN", "ALKPHOS", "ALT", "AST", "BILITOT" in the last 2160 hours.    Invalid input(s): "CREATININ"  URINALYSIS:  No results for input(s): "COLORU", "CLARITYU", "SPECGRAV", "PHUR", "PROTEINUA", "GLUCOSEU", "BILIRUBINCON", "BLOODU", "WBCU", "RBCU", "BACTERIA", "MUCUS", "NITRITE", "LEUKOCYTESUR", "UROBILINOGEN", "HYALINECASTS" in the last 2160 hours.   LIPIDS:  No results for input(s): "TSH", "HDL", "CHOL", "TRIG", "LDLCALC", "CHOLHDL", "NONHDLCHOL", "TOTALCHOLEST" in the last 2160 hours.  TSH:  No results for input(s): "TSH" in the last 2160 hours.  A1C:  No results for input(s): "HGBA1C" in the last 2160 hours.    Radiology:        Assessment:     Alcira Selby is a 59 y.o.female with:    Ductal carcinoma in situ (DCIS) of right breast    Chronic tension-type headache, not intractable  -     butalbital-acetaminophen-caffeine -40 mg (FIORICET, ESGIC) -40 mg per tablet; Take 1 tablet by mouth every 6 (six) hours as needed for Headaches.  Dispense: 60 tablet; Refill: 0    Insomnia, unspecified type    Mixed hyperlipidemia    OAB (overactive bladder)          Plan:     Problem List Items Addressed This Visit       Ductal carcinoma in situ (DCIS) of right breast - Primary    Overview     Is seeing dr lee  surgeon for initial surgery and surveillance  will be seeing dr mendez for breast reconstruction. Also is seeing dr wilkerson.   Awaiting dr mnedez and insurance coverage for reconstructive procedure . Has had reconstructive surgery in June .is now seeing dr wilkerson for routine surveillance     2/24/21 stable as above "   08/25/2021 stable   4/24/23 dr wilkerson managing surveillance   6/10 /24 stable  12/24 dr wilkerson managing surveillance          Chronic tension-type headache, not intractable    Overview      fiorcet is workign well stable          Mixed hyperlipidemia    Overview     Diet only repeat cmp and lipid next visit          OAB (overactive bladder)    Overview     No meds cont kegel          Insomnia    Overview      Temazepamas needed             As above, continue current medications and maintain follow up with specialists.  Return to clinic in 6 months.  '  Frederick W Dantagnan [facility]

## 2025-01-10 DIAGNOSIS — G44.229 CHRONIC TENSION-TYPE HEADACHE, NOT INTRACTABLE: ICD-10-CM

## 2025-01-10 RX ORDER — BUTALBITAL, ACETAMINOPHEN AND CAFFEINE 50; 325; 40 MG/1; MG/1; MG/1
1 TABLET ORAL EVERY 6 HOURS PRN
Qty: 60 TABLET | Refills: 0 | Status: SHIPPED | OUTPATIENT
Start: 2025-01-10

## 2025-01-10 NOTE — TELEPHONE ENCOUNTER
No care due was identified.  [facility] Embedded Care Due Messages. Reference number: [id number].   1/10/2025 2:19:31 PM CST

## 2025-02-13 DIAGNOSIS — G44.229 CHRONIC TENSION-TYPE HEADACHE, NOT INTRACTABLE: ICD-10-CM

## 2025-02-13 RX ORDER — BUTALBITAL, ACETAMINOPHEN AND CAFFEINE 50; 325; 40 MG/1; MG/1; MG/1
1 TABLET ORAL EVERY 6 HOURS PRN
Qty: 60 TABLET | Refills: 0 | Status: SHIPPED | OUTPATIENT
Start: 2025-02-13

## 2025-02-13 NOTE — TELEPHONE ENCOUNTER
No care due was identified.  [facility] Embedded Care Due Messages. Reference number: [id number].   2/13/2025 10:27:47 AM CST

## 2025-03-12 DIAGNOSIS — G44.229 CHRONIC TENSION-TYPE HEADACHE, NOT INTRACTABLE: ICD-10-CM

## 2025-03-12 NOTE — TELEPHONE ENCOUNTER
No care due was identified.  [facility] Embedded Care Due Messages. Reference number: [id number].   3/12/2025 4:28:38 PM CDT

## 2025-03-13 RX ORDER — BUTALBITAL, ACETAMINOPHEN AND CAFFEINE 50; 325; 40 MG/1; MG/1; MG/1
1 TABLET ORAL EVERY 6 HOURS PRN
Qty: 60 TABLET | Refills: 0 | Status: SHIPPED | OUTPATIENT
Start: 2025-03-13

## 2025-04-08 DIAGNOSIS — G44.229 CHRONIC TENSION-TYPE HEADACHE, NOT INTRACTABLE: ICD-10-CM

## 2025-04-08 RX ORDER — BUTALBITAL, ACETAMINOPHEN AND CAFFEINE 50; 325; 40 MG/1; MG/1; MG/1
1 TABLET ORAL EVERY 6 HOURS PRN
Qty: 60 TABLET | Refills: 0 | Status: SHIPPED | OUTPATIENT
Start: 2025-04-08

## 2025-04-08 NOTE — TELEPHONE ENCOUNTER
No care due was identified.  [facility] Embedded Care Due Messages. Reference number: [id number].   4/08/2025 1:35:15 PM CDT

## 2025-04-14 DIAGNOSIS — G44.229 CHRONIC TENSION-TYPE HEADACHE, NOT INTRACTABLE: ICD-10-CM

## 2025-04-14 DIAGNOSIS — J06.9 URI WITH COUGH AND CONGESTION: ICD-10-CM

## 2025-04-14 NOTE — TELEPHONE ENCOUNTER
----- Message from Maryam sent at 4/14/2025 10:30 AM CDT -----  Can the clinic reply in [facility]LE[facility]OR [[id number]]  Please refill the medication(s) listed below. Please call the patient when the prescription(s) is ready for  at this phone number   Telephone Information:Mobile          [phone]  Medication #[id number] (BROMFED DM) 2-30-10 mg/5 mL Syrp   [id number] [place] - [address] AT [address] & [address] [place] [address]Jewzf: [phone] Fax: [phone]

## 2025-04-15 RX ORDER — BUTALBITAL, ACETAMINOPHEN AND CAFFEINE 50; 325; 40 MG/1; MG/1; MG/1
1 TABLET ORAL EVERY 6 HOURS PRN
Qty: 60 TABLET | Refills: 0 | Status: SHIPPED | OUTPATIENT
Start: 2025-04-15 | End: 2025-04-16 | Stop reason: SDUPTHER

## 2025-04-15 RX ORDER — BROMPHENIRAMINE MALEATE, PSEUDOEPHEDRINE HYDROCHLORIDE, AND DEXTROMETHORPHAN HYDROBROMIDE 2; 30; 10 MG/5ML; MG/5ML; MG/5ML
SYRUP ORAL
Qty: 118 ML | Refills: 0 | Status: SHIPPED | OUTPATIENT
Start: 2025-04-15

## 2025-04-16 DIAGNOSIS — G44.229 CHRONIC TENSION-TYPE HEADACHE, NOT INTRACTABLE: ICD-10-CM

## 2025-04-16 RX ORDER — BUTALBITAL, ACETAMINOPHEN AND CAFFEINE 50; 325; 40 MG/1; MG/1; MG/1
1 TABLET ORAL EVERY 6 HOURS PRN
Qty: 60 TABLET | Refills: 0 | Status: SHIPPED | OUTPATIENT
Start: 2025-04-16

## 2025-04-16 NOTE — TELEPHONE ENCOUNTER
No care due was identified.  [facility] Embedded Care Due Messages. Reference number: [id number].   4/16/2025 1:10:16 PM CDT

## 2025-04-28 DIAGNOSIS — Z00.00 ENCOUNTER FOR MEDICARE ANNUAL WELLNESS EXAM: ICD-10-CM

## 2025-05-13 DIAGNOSIS — G44.229 CHRONIC TENSION-TYPE HEADACHE, NOT INTRACTABLE: ICD-10-CM

## 2025-05-13 RX ORDER — BUTALBITAL, ACETAMINOPHEN AND CAFFEINE 50; 325; 40 MG/1; MG/1; MG/1
1 TABLET ORAL EVERY 6 HOURS PRN
Qty: 60 TABLET | Refills: 0 | Status: SHIPPED | OUTPATIENT
Start: 2025-05-13

## 2025-05-13 NOTE — TELEPHONE ENCOUNTER
No care due was identified.  [facility] Embedded Care Due Messages. Reference number: [id number].   5/13/2025 2:32:31 PM CDT

## 2025-06-10 ENCOUNTER — HOSPITAL ENCOUNTER (OUTPATIENT)
Dept: RADIOLOGY | Facility: HOSPITAL | Age: 60
Discharge: HOME | End: 2025-06-10
Attending: INTERNAL MEDICINE
Payer: COMMERCIAL

## 2025-06-10 ENCOUNTER — OFFICE VISIT (OUTPATIENT)
Dept: PRIMARY CARE CLINIC | Facility: CLINIC | Age: 60
End: 2025-06-10
Payer: COMMERCIAL

## 2025-06-10 ENCOUNTER — RESULTS FOLLOW-UP (OUTPATIENT)
Dept: PRIMARY CARE CLINIC | Facility: CLINIC | Age: 60
End: 2025-06-10

## 2025-06-10 VITALS
BODY MASS INDEX: 34.48 KG/M2 | OXYGEN SATURATION: 97 % | HEIGHT: 68 IN | SYSTOLIC BLOOD PRESSURE: 128 MMHG | WEIGHT: 227.5 LBS | RESPIRATION RATE: 18 BRPM | HEART RATE: 72 BPM | DIASTOLIC BLOOD PRESSURE: 80 MMHG

## 2025-06-10 DIAGNOSIS — M25.552 LEFT HIP PAIN: ICD-10-CM

## 2025-06-10 DIAGNOSIS — G44.229 CHRONIC TENSION-TYPE HEADACHE, NOT INTRACTABLE: ICD-10-CM

## 2025-06-10 DIAGNOSIS — E66.811 OBESITY (BMI 30.0-34.9): ICD-10-CM

## 2025-06-10 DIAGNOSIS — D05.11 DUCTAL CARCINOMA IN SITU (DCIS) OF RIGHT BREAST: Primary | ICD-10-CM

## 2025-06-10 DIAGNOSIS — N32.81 OAB (OVERACTIVE BLADDER): ICD-10-CM

## 2025-06-10 DIAGNOSIS — E78.2 MIXED HYPERLIPIDEMIA: ICD-10-CM

## 2025-06-10 PROBLEM — E66.01 SEVERE OBESITY (BMI 35.0-39.9) WITH COMORBIDITY: Status: RESOLVED | Noted: 2023-04-24 | Resolved: 2025-06-10

## 2025-06-10 PROCEDURE — 99999 PR PBB SHADOW E&M-EST. PATIENT-LVL IV: CPT | Mod: PBBFAC,,, | Performed by: INTERNAL MEDICINE

## 2025-06-10 PROCEDURE — 73502 X-RAY EXAM HIP UNI 2-3 VIEWS: CPT | Mod: 26,LT,, | Performed by: RADIOLOGY

## 2025-06-10 PROCEDURE — 3008F BODY MASS INDEX DOCD: CPT | Mod: CPTII,S$GLB,, | Performed by: INTERNAL MEDICINE

## 2025-06-10 PROCEDURE — 1159F MED LIST DOCD IN RCRD: CPT | Mod: CPTII,S$GLB,, | Performed by: INTERNAL MEDICINE

## 2025-06-10 PROCEDURE — 73502 X-RAY EXAM HIP UNI 2-3 VIEWS: CPT | Mod: TC,LT

## 2025-06-10 PROCEDURE — 3079F DIAST BP 80-89 MM HG: CPT | Mod: CPTII,S$GLB,, | Performed by: INTERNAL MEDICINE

## 2025-06-10 PROCEDURE — 99214 OFFICE O/P EST MOD 30 MIN: CPT | Mod: S$GLB,,, | Performed by: INTERNAL MEDICINE

## 2025-06-10 PROCEDURE — 3074F SYST BP LT 130 MM HG: CPT | Mod: CPTII,S$GLB,, | Performed by: INTERNAL MEDICINE

## 2025-06-10 PROCEDURE — 1160F RVW MEDS BY RX/DR IN RCRD: CPT | Mod: CPTII,S$GLB,, | Performed by: INTERNAL MEDICINE

## 2025-06-10 RX ORDER — BUTALBITAL, ACETAMINOPHEN AND CAFFEINE 50; 325; 40 MG/1; MG/1; MG/1
1 TABLET ORAL EVERY 6 HOURS PRN
Qty: 60 TABLET | Refills: 0 | Status: SHIPPED | OUTPATIENT
Start: 2025-06-10

## 2025-06-10 NOTE — PROGRESS NOTES
[facility] Note    Chief Complaint      Chief Complaint   Patient presents with    Follow-up       History of Present Illness      Alcira Selby is a 59 y.o. female who presents today for   Chief Complaint   Patient presents with    Follow-up   .  Patient comes to appointment here for 6m checkup for chronic issues as below ,. She complains today of right hip stiffness and pain .    Problem List Items Addressed This Visit       Ductal carcinoma in situ (DCIS) of right breast - Primary    Overview   Is seeing dr vaughan  surgeon for initial surgery and surveillance  will be seeing dr mendez for breast reconstruction. Also is seeing dr wilkerson.   Awaiting dr mendez and insurance coverage for reconstructive procedure . Has had reconstructive surgery in June .is now seeing dr wilkerson for routine surveillance     2/24/21 stable as above   08/25/2021 stable   4/24/23 dr wilkerson managing surveillance   6/10 /24 stable  12/24 dr wilkerson managing surveillance          Chronic tension-type headache, not intractable    Overview    fiorcet is workign well stable          Mixed hyperlipidemia    Overview   Diet only repeat cmp and lipid next visit          OAB (overactive bladder)    Overview   No meds cont kegel          Obesity (BMI 30.0-34.9)     Other Visit Diagnoses         Left hip pain                  Past Medical History:  Past Medical History:   Diagnosis Date    Breast cancer     2004    Personal history of colonic polyps        Past Surgical History:  Past Surgical History:   Procedure Laterality Date    AXILLARY NODE DISSECTION Right 7/17/2019    Procedure: LYMPHADENECTOMY, AXILLARY RIGHT;  Surgeon: Darin Vaughan MD;  Location: [address];  Service: General;  Laterality: Right;    BREAST SURGERY      COLONOSCOPY N/A 12/12/2019    Procedure: COLONOSCOPY;  Surgeon: Estephanie Pratt MD;  Location: [facility] (4TH FLR);  Service: Endoscopy;  Laterality: N/A;    FOOT  SURGERY Bilateral     Plantar Fascities    HYSTERECTOMY      INCISION AND DRAINAGE Right 8/13/2019    Procedure: Incision and Drainage - right chest wall;  Surgeon: Darin Vaughan MD;  Location: [address];  Service: General;  Laterality: Right;    KNEE SURGERY Left     Total Knee Replacement    MASTECTOMY      MASTECTOMY Right 7/17/2019    Procedure: MASTECTOMY RIGHT;  Surgeon: Darin Vaughan MD;  Location: [address];  Service: General;  Laterality: Right;    MYOMECTOMY      SENTINEL LYMPH NODE BIOPSY Right 7/17/2019    Procedure: BIOPSY, LYMPH NODE, SENTINEL RIGHT;  Surgeon: Darin Vaughan MD;  Location: [address];  Service: General;  Laterality: Right;    TONSILLECTOMY         Family History:  family history includes Cancer in her maternal aunt; Hypertension in her father and mother.    Social History:  Social History[1]    Review of Systems:   Review of Systems   Constitutional:  Negative for fever and weight loss.   HENT:  Negative for congestion, hearing loss and sore throat.    Eyes:  Negative for blurred vision.   Respiratory:  Negative for cough and shortness of breath.    Cardiovascular:  Negative for chest pain, palpitations, claudication and leg swelling.   Gastrointestinal:  Negative for abdominal pain, constipation, diarrhea, heartburn, nausea and vomiting.   Genitourinary:  Negative for dysuria.   Musculoskeletal:  Positive for joint pain. Negative for back pain and myalgias.   Skin:  Negative for rash.   Neurological:  Negative for focal weakness and headaches.   Psychiatric/Behavioral:  Negative for depression, memory loss and suicidal ideas. The patient is not nervous/anxious.          Medications:  Encounter Medications[2]     Allergies:  Review of patient's allergies indicates:   Allergen Reactions    Dermabond [tissue glues] Rash    Sulfamethoxazole-trimethoprim Hives and Itching         Physical Exam         Vitals:    06/10/25 0906   BP: 128/80   Pulse: 72   Resp:  "18         Physical Exam  Constitutional:       Appearance: She is well-developed.   Eyes:      Pupils: Pupils are equal, round, and reactive to light.   Neck:      Thyroid: No thyromegaly.   Cardiovascular:      Rate and Rhythm: Normal rate.      Heart sounds: Normal heart sounds. No murmur heard.     No friction rub. No gallop.   Pulmonary:      Breath sounds: Normal breath sounds.   Abdominal:      General: Bowel sounds are normal.      Palpations: Abdomen is soft.   Musculoskeletal:         General: Normal range of motion.      Cervical back: Normal range of motion.      Right hip: Tenderness present.   Lymphadenopathy:      Cervical: No cervical adenopathy.   Skin:     General: Skin is warm.      Findings: No rash.   Neurological:      Mental Status: She is alert and oriented to person, place, and time.      Cranial Nerves: No cranial nerve deficit.   Psychiatric:         Behavior: Behavior normal.          Laboratory:  CBC:  No results for input(s): "WBC", "RBC", "HGB", "HCT", "PLT", "MCV", "MCH", "MCHC" in the last 2160 hours.  CMP:  No results for input(s): "GLU", "CALCIUM", "ALBUMIN", "PROT", "NA", "K", "CO2", "CL", "BUN", "ALKPHOS", "ALT", "AST", "BILITOT" in the last 2160 hours.    Invalid input(s): "CREATININ"  URINALYSIS:  No results for input(s): "COLORU", "CLARITYU", "SPECGRAV", "PHUR", "PROTEINUA", "GLUCOSEU", "BILIRUBINCON", "BLOODU", "WBCU", "RBCU", "BACTERIA", "MUCUS", "NITRITE", "LEUKOCYTESUR", "UROBILINOGEN", "HYALINECASTS" in the last 2160 hours.   LIPIDS:  No results for input(s): "TSH", "HDL", "CHOL", "TRIG", "LDLCALC", "CHOLHDL", "NONHDLCHOL", "TOTALCHOLEST" in the last 2160 hours.  TSH:  No results for input(s): "TSH" in the last 2160 hours.  A1C:  No results for input(s): "HGBA1C" in the last 2160 hours.    Radiology:        Assessment:     Alcira Selby is a 59 y.o.female with:    Ductal carcinoma in situ (DCIS) of right breast    Chronic tension-type headache, not " intractable  -     butalbital-acetaminophen-caffeine -40 mg (FIORICET, ESGIC) -40 mg per tablet; Take 1 tablet by mouth every 6 (six) hours as needed for Headaches.  Dispense: 60 tablet; Refill: 0    Mixed hyperlipidemia    OAB (overactive bladder)    Obesity (BMI 30.0-34.9)    Left hip pain  -     X-Ray Hip 2 or 3 views Left with Pelvis when performed; Future; Expected date: 06/10/2025          Plan:     Problem List Items Addressed This Visit       Ductal carcinoma in situ (DCIS) of right breast - Primary    Overview   Is seeing dr lee  surgeon for initial surgery and surveillance  will be seeing dr mendez for breast reconstruction. Also is seeing dr wilkerson.   Awaiting dr mendez and insurance coverage for reconstructive procedure . Has had reconstructive surgery in June .is now seeing dr wilkerson for routine surveillance     2/24/21 stable as above   08/25/2021 stable   4/24/23 dr wilkerson managing surveillance   6/10 /24 stable  12/24 dr wilkerson managing surveillance          Chronic tension-type headache, not intractable    Overview    fiorcet is workign well stable          Mixed hyperlipidemia    Overview   Diet only repeat cmp and lipid next visit          OAB (overactive bladder)    Overview   No meds cont kegel          Obesity (BMI 30.0-34.9)     Other Visit Diagnoses         Left hip pain                As above, continue current medications and maintain follow up with specialists.  Return to clinic in 6 months.  '  [facility] Dantagn[facility]                       [1]   Social History  Socioeconomic History    Marital status:    Tobacco Use    Smoking status: Never    Smokeless tobacco: Never   Substance and Sexual Activity    Alcohol use: No    Drug use: No     Social Drivers of Health     Financial Resource Strain: High Risk (10/5/2020)    Overall Financial Resource Strain (CARDIA)     Difficulty of Paying Living Expenses: Very hard    Food Insecurity: Food Insecurity Present (10/5/2020)    Hunger Vital Sign     Worried About Running Out of Food in the Last Year: Sometimes true     Ran Out of Food in the Last Year: Sometimes true   Transportation Needs: No Transportation Needs (10/5/2020)    PRAPARE - Transportation     Lack of Transportation (Medical): No     Lack of Transportation (Non-Medical): No   Physical Activity: Unknown (10/5/2020)    Exercise Vital Sign     Days of Exercise per Week: 7 days   Stress: No Stress Concern Present (10/5/2020)    [employer] - Occupational Stress Questionnaire     Feeling of Stress : Not at all   [2]   Outpatient Encounter Medications as of 6/10/2025   Medication Sig Note Dispense Refill    (Magic mouthwash) 1:1:1 diphenhydrAMINE(Benadryl) 12.5mg/5ml liq, aluminum & magnesium hydroxide-simethicone (Maalox), LIDOcaine viscous 2% Swish and spit 10 mLs every 4 (four) hours as needed (severe sore throat). for mouth sores  400 mL 0    brompheniramine-pseudoeph-DM (BROMFED DM) 2-30-10 mg/5 mL Syrp TAKE 5 ML BY MOUTH FOUR TIMES DAILY AS NEEDED  118 mL 0    cyanocobalamin (VITAMIN B-12) 100 MCG tablet Take by mouth. 7/16/2019: Hold am of procedure       docusate sodium (COLACE) 100 MG capsule Take 100 mg by mouth 2 (two) times daily. 7/16/2019: Hold  am of procedure       fluticasone propionate (FLONASE) 50 mcg/actuation nasal spray INHALE 1 SPRAY IN EACH NOSTRIL EVERY DAY.  48 g 3    gabapentin (NEURONTIN) 300 MG capsule TAKE 1 CAPSULE(300 MG) BY MOUTH EVERY EVENING  30 capsule 5    multivit-min/iron/folic/lutein (MULTIVITAMIN WOMEN 50 PLUS ORAL)        ondansetron (ZOFRAN) 4 MG tablet Take 1 tablet (4 mg total) by mouth every 8 (eight) hours as needed for Nausea.  10 tablet 3    temazepam (RESTORIL) 15 mg Cap Take 1 capsule (15 mg total) by mouth nightly as needed.  30 capsule 0    triamcinolone acetonide 0.1% (KENALOG) 0.1 % cream APPLY AA BID PRN   0    vitamin B complex (SUPER B  COMPLEX-B-12 ORAL) Take by mouth. 7/16/2019: Hold am of procedure       [DISCONTINUED] butalbital-acetaminophen-caffeine -40 mg (FIORICET, ESGIC) -40 mg per tablet Take 1 tablet by mouth every 6 (six) hours as needed for Headaches.  60 tablet 0    butalbital-acetaminophen-caffeine -40 mg (FIORICET, ESGIC) -40 mg per tablet Take 1 tablet by mouth every 6 (six) hours as needed for Headaches.  60 tablet 0    [DISCONTINUED] butalbital-acetaminophen-caffeine -40 mg (FIORICET, ESGIC) -40 mg per tablet Take 1 tablet by mouth every 6 (six) hours as needed for Headaches.  60 tablet 0     No facility-administered encounter medications on file as of 6/10/2025.

## 2025-07-16 ENCOUNTER — TELEPHONE (OUTPATIENT)
Dept: PRIMARY CARE CLINIC | Facility: CLINIC | Age: 60
End: 2025-07-16
Payer: COMMERCIAL

## 2025-07-16 NOTE — TELEPHONE ENCOUNTER
Copied from CRM #[id number]. Topic: Medications - Medication Refill  >> Jul 16, 2025  8:41 AM Tessie wrote:  Requesting an RX refill or new RX.    Is this a refill or new RX: refill    RX name and strength (copy/paste from chart):  butalbital-acetaminophen-caffeine -40 mg (FIORICET, ESGIC) -40 mg per tablet     Is this a 30 day or 90 day RX: 60    Pharmacy name and phone # (copy/paste from chart):    [id number] - [place], [place] - [address] [place] [address] AT [facility] & [address]  [facility] [place] [address]  Phone: [phone] Fax: [phone]    Who called and call back number:Alcira Selby [phone]    The doctors have asked that we provide their patients with the following 2 reminders -- prescription refills can take up to 72 hours, and a friendly reminder that in the future you can use your [facility] account to request refills:

## 2025-07-18 DIAGNOSIS — G44.229 CHRONIC TENSION-TYPE HEADACHE, NOT INTRACTABLE: ICD-10-CM

## 2025-07-18 RX ORDER — BUTALBITAL, ACETAMINOPHEN AND CAFFEINE 50; 325; 40 MG/1; MG/1; MG/1
1 TABLET ORAL EVERY 6 HOURS PRN
Qty: 60 TABLET | Refills: 0 | Status: SHIPPED | OUTPATIENT
Start: 2025-07-18

## 2025-07-18 NOTE — TELEPHONE ENCOUNTER
No care due was identified.  [facility] Embedded Care Due Messages. Reference number: [id number].   7/18/2025 9:24:05 AM CDT

## 2025-07-23 ENCOUNTER — TELEPHONE (OUTPATIENT)
Dept: VASCULAR SURGERY | Facility: CLINIC | Age: 60
End: 2025-07-23
Payer: COMMERCIAL

## 2025-08-27 ENCOUNTER — PATIENT MESSAGE (OUTPATIENT)
Dept: FAMILY MEDICINE | Facility: CLINIC | Age: 60
End: 2025-08-27

## (undated) DEVICE — BLADE SURG CARBON STEEL SZ11

## (undated) DEVICE — TRAY MINOR GEN SURG

## (undated) DEVICE — SEE MEDLINE ITEM 152622

## (undated) DEVICE — SEE MEDLINE ITEM 146417

## (undated) DEVICE — ELECTRODE BLADE INSULATED 1 IN

## (undated) DEVICE — ADHESIVE MASTISOL VIAL 48/BX

## (undated) DEVICE — SYR DISP LL 5CC

## (undated) DEVICE — SUT 2-0 VICRYL / SH (J417)

## (undated) DEVICE — CUP MEDICINE STERILE 2OZ

## (undated) DEVICE — SUT MCRYL PLUS 4-0 PS2 27IN

## (undated) DEVICE — SEE MEDLINE ITEM 157117

## (undated) DEVICE — NDL 18GA X1 1/2 REG BEVEL

## (undated) DEVICE — NEOGUARD COVER 4X30CM STERILE

## (undated) DEVICE — ELECTRODE REM PLYHSV RETURN 9

## (undated) DEVICE — DRESSING XEROFORM FOIL PK 1X8

## (undated) DEVICE — GAUZE FLUFF XXLG 36X36 2 PLY

## (undated) DEVICE — SPONGE DERMACEA 4X4IN 12PLY

## (undated) DEVICE — APPLICATOR CHLORAPREP ORN 26ML

## (undated) DEVICE — EVACUATOR WOUND BULB 100CC

## (undated) DEVICE — TAPE SURG MEDIPORE 6X72IN

## (undated) DEVICE — PACK UNIVERSAL SPLIT II

## (undated) DEVICE — SEE MEDLINE ITEM 157148

## (undated) DEVICE — DRAIN CHANNEL ROUND 15FR

## (undated) DEVICE — PAD ABD 8X10 STERILE

## (undated) DEVICE — DRAPE STERI INSTRUMENT 1018

## (undated) DEVICE — SUT ETHILON 2-0 PSLX 30IN

## (undated) DEVICE — SUT VICRYL 3-0 27 SH